# Patient Record
Sex: MALE | Race: WHITE | Employment: OTHER
[De-identification: names, ages, dates, MRNs, and addresses within clinical notes are randomized per-mention and may not be internally consistent; named-entity substitution may affect disease eponyms.]

---

## 2020-01-14 ENCOUNTER — TRANSCRIBE ORDERS (OUTPATIENT)
Dept: OTHER | Age: 75
End: 2020-01-14

## 2020-01-14 DIAGNOSIS — K22.0 ACHALASIA: Primary | ICD-10-CM

## 2020-01-17 ENCOUNTER — CARE COORDINATION (OUTPATIENT)
Dept: GASTROENTEROLOGY | Facility: CLINIC | Age: 75
End: 2020-01-17

## 2020-01-17 NOTE — PROGRESS NOTES
Care Coordination New Patient Referral  Advanced GI Service    NP referral date: 20  Referred to MD: advanced endoscopy    Referring MD: Viktor Lacy  - [facility]  Referring contact information see initial referral note   Referral for POEM    Diagnosis: Achalasia  1/10/20 attempted Heller Myotomy aborted and referral to Dr. Sepulveda made.  Preop h and P done 19 -  See Care Everywhere for full report.    Imagin20 Carl from [facility] will send images for Manometry and Esophogram.    CT   Location: [facility]  Date:  Most recent 10/13/18  MRI  No    Manometry 19 [facility]  IMPRESSION:  1. Adequate relaxation of the upper esophageal sphincter.   2. The motility of the esophageal body is markedly abnormal with   all swallows demonstrating pan-esophageal pressurization. There   was complete bolus clearance in half of the swallows.  3. The LES demonstrated adequate resting pressure with inadequate   relaxation.  The IRP was 25.6.  4. Based on [place] classification version 3.0, this study is   diagnostic of type 2 achalasia.  This is the etiology of   Vincent's dysphagia and food impaction.  Esophogram 19 [facility]    Procedures:  EGD 19 - see care everywhere  EGD 1/10/20  Findings: Proximal extent of narrowed esophageal segment started at 30 cm  No retained esophageal contents proximal to narrowed segment   Laparoscopic evaluation demonstrated very significant dense fibrotic periesophageal adhesions at hiatus  Unable to dissect esophagus circumferentially beyond hiatus   Procedure aborted and discussed with colleagues in GI   Will refer for POEM    Referral will be reviewed when all information has been received (waiting for CD with images)     20 Spoke with patient; he is aware of referral and aware that Dr. Sepulveda will review images and we will be in contact with him to set up clinic visit.    20 Per Dr. Sepulveda:  Procedure/Imaging/Clinic: Clinic visit, then  EGD with POEM; will be admitted for obs afterwards   Physician: Derick   Timing: Next avail   Procedure length: 90 min   Anesthesia: Gen   Dx: Type 3 achalasia   Location: OR     Referral sent to New Patient scheduling on 01/21/20 at 11:15 am via in basket staff message and the patient will be contacted with appointment.       Sheila GASCAN, HNBC  RN Care Coordinator  [facility]  Ph: [phone]  Email: [email or web address]

## 2020-01-21 ENCOUNTER — PREP FOR PROCEDURE (OUTPATIENT)
Dept: GASTROENTEROLOGY | Facility: CLINIC | Age: 75
End: 2020-01-21

## 2020-01-21 DIAGNOSIS — K22.0 ACHALASIA: Primary | ICD-10-CM

## 2020-01-22 NOTE — TELEPHONE ENCOUNTER
ONCOLOGY INTAKE: Records Information      APPT INFORMATION:  Referring provider:  Dr Viktor Lacy  Referring provider s clinic:  [facility]  Reason for visit/diagnosis:  Achalasia  Has patient been notified of appointment date and time?: Yes    RECORDS INFORMATION:  Were the records received with the referral (via [employer])? Yes, [facility]    Has patient been seen for any external appt for this diagnosis? yes    If yes, where? [facility]    Has patient had any imaging or procedures outside of [place] for this condition? Yes      If Yes, where? [facility]    ADDITIONAL INFORMATION:   Received IB from Sheila to schedule pt with Dr Grant first available. Called pt and scheduled.

## 2020-01-23 NOTE — TELEPHONE ENCOUNTER
RECORDS STATUS - ALL OTHER DIAGNOSIS      RECORDS RECEIVED FROM: THALIA Yusuf   DATE RECEIVED: Care Everywhere   NOTES STATUS DETAILS   OFFICE NOTE from referring provider     OFFICE NOTE from medical oncologist     DISCHARGE SUMMARY from hospital     DISCHARGE REPORT from the ER     OPERATIVE REPORT     MEDICATION LIST     CLINICAL TRIAL TREATMENTS TO DATE     LABS     PATHOLOGY REPORTS     ANYTHING RELATED TO DIAGNOSIS Negative/Benign [facility]   GENONOMIC TESTING     TYPE:     IMAGING (NEED IMAGES & REPORT)     CT SCANS     ESOPHAGRAM Merged to PACS 1/23/20 [facility]   MRI     MAMMO     ULTRASOUND     PET

## 2020-01-27 PROBLEM — K22.0 ACHALASIA: Status: ACTIVE | Noted: 2020-01-27

## 2020-01-29 ENCOUNTER — PRE VISIT (OUTPATIENT)
Dept: GASTROENTEROLOGY | Facility: CLINIC | Age: 75
End: 2020-01-29

## 2020-01-29 ENCOUNTER — OFFICE VISIT (OUTPATIENT)
Dept: GASTROENTEROLOGY | Facility: CLINIC | Age: 75
End: 2020-01-29
Attending: INTERNAL MEDICINE
Payer: MEDICARE

## 2020-01-29 VITALS
SYSTOLIC BLOOD PRESSURE: 131 MMHG | WEIGHT: 129.5 LBS | OXYGEN SATURATION: 95 % | HEART RATE: 86 BPM | DIASTOLIC BLOOD PRESSURE: 78 MMHG | RESPIRATION RATE: 14 BRPM | TEMPERATURE: 98.1 F

## 2020-01-29 DIAGNOSIS — K22.0 ACHALASIA: Primary | ICD-10-CM

## 2020-01-29 PROCEDURE — G0463 HOSPITAL OUTPT CLINIC VISIT: HCPCS | Mod: ZF

## 2020-01-29 RX ORDER — SIMETHICONE 80 MG
80 TABLET,CHEWABLE ORAL
COMMUNITY
Start: 2020-01-10 | End: 2020-03-04

## 2020-01-29 RX ORDER — TERBINAFINE HYDROCHLORIDE 250 MG/1
TABLET ORAL
COMMUNITY
Start: 2020-01-21 | End: 2020-03-11

## 2020-01-29 RX ORDER — ACETAMINOPHEN 325 MG/1
TABLET ORAL
COMMUNITY
Start: 2020-01-10 | End: 2020-03-11

## 2020-01-29 RX ORDER — LAMOTRIGINE 200 MG/1
200 TABLET ORAL 2 TIMES DAILY
COMMUNITY
Start: 2013-10-31 | End: 2020-09-17

## 2020-01-29 RX ORDER — ALENDRONATE SODIUM 70 MG/1
70 TABLET ORAL
COMMUNITY
Start: 2013-10-30 | End: 2020-06-17

## 2020-01-29 ASSESSMENT — PAIN SCALES - GENERAL: PAINLEVEL: NO PAIN (0)

## 2020-01-29 NOTE — NURSING NOTE
Oncology Rooming Note    January 29, 2020 8:54 AM   Pradeep Figueroa Jr. is a 74 year old male who presents for:    Chief Complaint   Patient presents with     Oncology Clinic Visit     New; Achalasia     Initial Vitals: /78   Pulse 86   Temp 98.1  F (36.7  C) (Oral)   Resp 14   Wt 58.7 kg (129 lb 8 oz)   SpO2 95%  There is no height or weight on file to calculate BMI. There is no height or weight on file to calculate BSA.  No Pain (0) Comment: Data Unavailable   No LMP for male patient.  Allergies reviewed: Yes  Medications reviewed: Yes    Medications: Medication refills not needed today.  Pharmacy name entered into [employer]: [id number] - [place], [place] - [address]    Clinical concerns: No concerns        Tabitha Rosa CMA

## 2020-01-29 NOTE — PROGRESS NOTES
INTERVENTIONAL ENDOSCOPY OUTPATIENT CLINIC CONSULT  DATE OF SERVICE: 1/29/2020  PHYSICIAN REQUESTING CONSULT: Salazar Sepulveda MD  Reason for Consultation: type II achalasia, aborted Heller myotomy    ASSESSMENT:  Pradeep is a 74 year old male with a PMHx of epilepsy and osteoporosis who was referred for a recent diagnosis of type II achalasia with an aborted Heller myotomy due to adhesion by Dr. Lacy at [facility].     I discussed the diagnosis of achalasia with the patient and the pathophysiology although the etiology is unclear. We reviewed all the treatment options of achalasia including Heller myotomy with fundoplication and POEM. I explained that these interventions do not reverse the condition but allow the esophagus to empty relieving symptoms.     I discussed the POEM procedure in detail including the risks which including delayed bleeding, esophageal leak/perforation, pneumoperitoneum, infection, and worsening reflux. I explained that she would be admitted after the procedure for observation with an esophagram the following morning to rule out a leak. I also explained that BID PPI for 1 month following the procedure will be needed.     I did explain that with the POEM procedure it appears that about 20% of patient will require long term PPI therapy afterwards for reflux disease in the longer term studies. The efficacy appears to be similarly high between a Heller and a POEM but Heller has much longer term data to draw off of given that POEM was first done only 10 years ago and with a fundoplication potentially a lower reflux rate. I explained the advantage of a POEM procedure is the lower invasiveness of the procedure and the potential for fewer complications. Only one head-to-head RCT is available on this which was an noninferiority study which showed POEM to be noninferior to heller myotomy with Shaun fundoplication, but with a higher reflux rate. Since heller myotomy was met with significant difficulty  during dissection, POEM is really the best option. The patient and his wife were agreeable in proceeding.      RECOMMENDATIONS:  - Proceed with EGD with POEM in the OR      Thank you for this consultation.  It was a pleasure to participate in the care of this patient; please contact us with any further questions.  A total of 45 minutes was spent in face to face evaluation with this patient, >50% of which was counseling and coordinating a management plan for this patient.     Salazar Sepulveda MD  [facility]  Division of Gastroenterology and Hepatology  [place] [address]    ________________________________________________________________  HPI:  Pradeep is a 74 year old male with a PMHx of epilepsy and osteoporosis who was referred for a recent diagnosis of type II achalasia with an aborted Heller myotomy by Dr. Lacy at [facility]. The patient and his wife report symptoms of solid food getting stuck in his midchest since last summer that have been progressively worsening. He occasionally has regurgitation. No chest pain, nausea or vomiting. He has compensated by being on essentially a pureed diet. He reports a 5-10 lb weight loss since then. He underwent an EGD which showed retained food in the esophagus but was otherwise normal. Manometry showed elevated IRP and panesophageal pressurization consistent with type II achalasia. He was referred to Dr. Lacy who called me from the operating room at the time of his surgery. Dr. Lacy encountered dense adhesions upon dissecting down to the esophagus and was concerned about causing a complication if he proceeded any further. We discussed attempting POEM instead. We also discussed doing a partial fundoplication since he was there to reduce Pradeep's risk of post-POEM reflux. I recommended against this as his dysphagia may get severe after the fundoplication in the interim waiting to undergoing a POEM.  Also we may have to wait a while longer after the fundoplication before attempting POEM as some surrounding inflammation/edema related to the fundoplication may make POEM transiently difficult at the GE junction. Dr. Lacy aborted the procedure and referred Pradeep to me. He denies any prior abdominal surgeries, trauma, or radiation.     Eckardt score:  Dysphagia   3   Regurgitation   1   Retrosternal pain  0   Weight loss   1  Total    5    PMHx:  Epilepsy  Osteoporosis    PSurgHx:  Aborted laparoscopic Heller myotomy    MEDS:  Current Outpatient Medications   Medication     acetaminophen (TYLENOL) 325 MG tablet     lamoTRIgine (LAMICTAL) 200 MG tablet     omeprazole (PRILOSEC) 20 MG DR capsule     terbinafine (LAMISIL) 250 MG tablet     alendronate (FOSAMAX) 70 MG tablet     cholecalciferol 25 MCG (1000 UT) TABS     simethicone (MYLICON) 80 MG chewable tablet     No current facility-administered medications for this visit.      ALLERGIES:  No Known Allergies  FHx: No history of GI malignancy    SOCIAL Hx:  Social History     Socioeconomic History     Marital status:      Spouse name: Not on file     Number of children: Not on file     Years of education: Not on file     Highest education level: Not on file   Occupational History     Not on file   Social Needs     Financial resource strain: Not on file     Food insecurity:     Worry: Not on file     Inability: Not on file     Transportation needs:     Medical: Not on file     Non-medical: Not on file   Tobacco Use     Smoking status: Never Smoker     Smokeless tobacco: Never Used   Substance and Sexual Activity     Alcohol use: Not Currently     Drug use: Never     Sexual activity: Not on file   Lifestyle     Physical activity:     Days per week: Not on file     Minutes per session: Not on file     Stress: Not on file   Relationships     Social connections:     Talks on phone: Not on file     Gets together: Not on file     Attends Jainism service: Not on  file     Active member of club or organization: Not on file     Attends meetings of clubs or organizations: Not on file     Relationship status: Not on file     Intimate partner violence:     Fear of current or ex partner: Not on file     Emotionally abused: Not on file     Physically abused: Not on file     Forced sexual activity: Not on file   Other Topics Concern     Not on file   Social History Narrative     Not on file       ROS: A comprehensive Review of Systems was asked and answered in the negative unless specifically commented upon in the HPI    Answers for HPI/ROS submitted by the patient on 1/26/2020   General Symptoms: No  Skin Symptoms: No  HENT Symptoms: No  EYE SYMPTOMS: No  HEART SYMPTOMS: No  LUNG SYMPTOMS: No  INTESTINAL SYMPTOMS: No  URINARY SYMPTOMS: No  REPRODUCTIVE SYMPTOMS: No  SKELETAL SYMPTOMS: No  BLOOD SYMPTOMS: No  NERVOUS SYSTEM SYMPTOMS: No  MENTAL HEALTH SYMPTOMS: No    Physical Exam  /78   Pulse 86   Temp 98.1  F (36.7  C) (Oral)   Resp 14   Wt 58.7 kg (129 lb 8 oz)   SpO2 95%   There is no height or weight on file to calculate BMI.  Gen: A&Ox3, NAD  HEENT: Moist mucus membranes, no scleral icterus.  Lungs: no respiratory distress  Abd: soft, non-tender, non-distended.  No guarding/rigidity/rebound.  Skin: no jaundice, no stigmata of chronic liver disease  Ext: warm, dry, no evidence of edema    IMAGING:  Rad Results In - 11/18/2019 11:40 AM CST  IMPRESSION  COMPARISON:  None.    FINDINGS:  The patient swallowed barium without difficulty. There is long segment narrowing of the distal esophagus extending to the gastroesophageal junction. No obstruction noted, though there was some delay of contrast beyond this point when the patient was in the prone position. No intrinsic mass appreciated. Small hiatal hernia.    IMPRESSION:   Long segment narrowing of the distal esophagus with retention of contrast proximally when in the prone position. This could be due to abnormal  motility versus a stricture. No mass was appreciated though this would be difficult to completely exclude with an esophagram. Further evaluation with manometry and/or endoscopy as clinically indicated.    Endoscopies:  Santosh Cool DO - 09/28/2019  3:59 AM CDT  [facility]  _______________________________________________________________________________  Patient Name: Pradeep Figueroa           Procedure Date: 9/28/2019  MRN: [id number]                       Gender: Male  Account Number: [id number]             YOB: 1945  Admit Type: Emergency Department        _______________________________________________________________________________    Procedure:                    Upper GI endoscopy  Proceduralist:                Santosh Cool MD - [place]                                 Gastroenterology PA  Indications/Pre-Op Diagnosis: Food bolus in the esophagus  Medications:                  General Anesthesia    Procedure Description:       Risk of bleeding, infection, perforation, need for surgery and        alternatives discussed.       The endoscope was introduced through the mouth, and advanced to the        second part of duodenum. The upper GI endoscopy was accomplished without        difficulty. The patient tolerated the procedure well.    Complications:                No immediate complications.  Estimated Blood Loss & Specimen:       Estimated blood loss: none.       Specimen collected: None    Findings:       A large amount of steak was found in the middle third of the esophagus        and in the lower third of the esophagus. Removal of food was        accomplished with rat tooth forceps.       The mid to distal esophagus appeared narrowed. No focal stricture was        identified. Biopsies were obtained with a cold biopsy forceps from the        mid and distal esopaghus to assess for eosinophilic esophagitis.       The Z-line was regular and was found 39 cm from the  incisors.       The entire examined stomach was normal.       The examined duodenum was normal.    Impressions/Post-Op Diagnosis:       - Food in the middle third of the esophagus and in the lower third of        the esophagus. Removal was successful.       - Z-line regular, 39 cm from the incisors.       - Normal stomach.       - Normal examined duodenum.    Recommendation:       - Discharge patient to home.       - Soft diet. Avoid meat.       - Remain on omeprazole 20 mg daily       - Await pathology results.       - Will have to consider repeat EGD with empiric dilation of the        esophagus vs esophageal manometry pending the results of the biopsies.       - Thank you for allowing us to participate in the care of this patient.      A) ESOPHAGUS, DISTAL, BIOPSY:  1. Normal esophageal squamous mucosa  2. Negative for reflux changes and eosinophilic esophagitis  3. Negative for columnar mucosa    B) ESOPHAGUS, MID, BIOPSY:  1. Normal esophageal squamous mucosa  2. Negative for reflux changes and eosinophilic esophagitis  3. Negative for columnar mucosa    Sarmad Basurto MD     12/10/2019  1:14 PM  Name: Pradeep Figueroa Jr.  : 1945  Date of Service:  2019    Endoscopist: Sarmad Basurto MD     Referring Provider(s):  Steffen Camarena MD    Type of Study: High resolution esophageal manometry study    Indication for the study: Patient is a 74-year-old man undergoing   evaluation for progressively worsening dysphagia.  He has had a   history food impaction in 2019 that required him to   come into the ER an undergo emergent disimpaction.  At that time,   there was no evidence of stenosis.  No evidence of eosinophilic   esophagitis was present and biopsies were unremarkable.  Has had   inadequate food intake since then.    Pertinent Gastrointestinal Investigations:  EGD 2019:    Great a esophagitis without evidence of stenosis.  Esophagram 2019:  Long segment narrowing  of the distal   esophagus with retention of contrast proximally could be due to   abnormal motility verses a stricture.  No mass present.    Pertinent medications:  Omeprazole 20 mg PO Q daily    Procedure information: High resolution esophageal manometry study   was performed using the [employer] system. Patient arrived after   overnight fasting. A motility catheter with 36 circumferential   sensors on 1 cm spacing was inserted trasnasally after   application of topical anesthesia to the nasal passage. The   catheter was positioned so that at least 2 distal sensors were in   the stomach and 2 proximal sensors were located above the upper   esophageal sphincter. A 5-minute acclimation period was provided   followed by 10 wet swallows in the supine position.     Findings:     Upper Esophageal Sphincter:    The upper esophageal sphincter relaxed normally in response to   all 10 wet swallows in supine position.    Esophageal Body:    For the 10 wet swallows in supine position: In the esophageal   body 0/10 wet swallows were followed by peristalsis (0 %), 10/10   by simultaneous contraction (100%), and 10/10 by failed   contractions (100%).     The distal contractile integral was unable to be measured due to   all swallows having failed.    The distal latency was unable to be measured due to all swallows   having failed.    Complete bolus clearance occurred in 5/10 (50 %) of swallows.    Lower Esophageal Sphincter:    The mean basal pressure of the lower esophageal sphincter was   22.8 mmHg (normal for wet swallows is 13 to 43 mmHg) for the 10   wet swallows in the supine position.    The lower esophageal sphincter did not demonstrate adequate   relaxation in response to 10 wet swallows in supine position with   an IRP of 25.6 mmHg (normal less than 15 mmHg).     IMPRESSION:  1. Adequate relaxation of the upper esophageal sphincter.   2. The motility of the esophageal body is markedly abnormal with   all swallows  demonstrating pan-esophageal pressurization. There   was complete bolus clearance in half of the swallows.  3. The LES demonstrated adequate resting pressure with inadequate   relaxation.  The IRP was 25.6.  4. Based on [place] classification version 3.0, this study is   diagnostic of type 2 achalasia.  This is the etiology of   Vincent's dysphagia and food impaction.    RECOMMENDATIONS:  1. I recommend consultation with a foregut surgeon for myotomy.

## 2020-01-29 NOTE — LETTER
1/29/2020       RE: Pradeep Figueroa Jr.  [address] [place] [address]     Dear Colleague,    Thank you for referring your patient, Pradeep Figueroa Jr., to the [facility] at [facility]. Please see a copy of my visit note below.    INTERVENTIONAL ENDOSCOPY OUTPATIENT CLINIC CONSULT  DATE OF SERVICE: 1/29/2020  PHYSICIAN REQUESTING CONSULT: Salazar Sepulveda MD  Reason for Consultation: type II achalasia, aborted Heller myotomy    ASSESSMENT:  Pradeep is a 74 year old male with a PMHx of epilepsy and osteoporosis who was referred for a recent diagnosis of type II achalasia with an aborted Heller myotomy due to adhesion by Dr. Lacy at [facility].     I discussed the diagnosis of achalasia with the patient and the pathophysiology although the etiology is unclear. We reviewed all the treatment options of achalasia including Heller myotomy with fundoplication and POEM. I explained that these interventions do not reverse the condition but allow the esophagus to empty relieving symptoms.     I discussed the POEM procedure in detail including the risks which including delayed bleeding, esophageal leak/perforation, pneumoperitoneum, infection, and worsening reflux. I explained that she would be admitted after the procedure for observation with an esophagram the following morning to rule out a leak. I also explained that BID PPI for 1 month following the procedure will be needed.     I did explain that with the POEM procedure it appears that about 20% of patient will require long term PPI therapy afterwards for reflux disease in the longer term studies. The efficacy appears to be similarly high between a Heller and a POEM but Heller has much longer term data to draw off of given that POEM was first done only 10 years ago and with a fundoplication potentially a lower reflux rate. I explained the advantage of a POEM procedure is the lower invasiveness of the  procedure and the potential for fewer complications. Only one head-to-head RCT is available on this which was an noninferiority study which showed POEM to be noninferior to heller myotomy with Shaun fundoplication, but with a higher reflux rate. Since heller myotomy was met with significant difficulty during dissection, POEM is really the best option. The patient and his wife were agreeable in proceeding.      RECOMMENDATIONS:  - Proceed with EGD with POEM in the OR      Thank you for this consultation.  It was a pleasure to participate in the care of this patient; please contact us with any further questions.  A total of 45 minutes was spent in face to face evaluation with this patient, >50% of which was counseling and coordinating a management plan for this patient.     Salazar Sepulveda MD  [facility]  Division of Gastroenterology and Hepatology  [facility] [address] [address]    ________________________________________________________________  HPI:  Pradeep is a 74 year old male with a PMHx of epilepsy and osteoporosis who was referred for a recent diagnosis of type II achalasia with an aborted Heller myotomy by Dr. Lacy at [facility]. The patient and his wife report symptoms of solid food getting stuck in his midchest since last summer that have been progressively worsening. He occasionally has regurgitation. No chest pain, nausea or vomiting. He has compensated by being on essentially a pureed diet. He reports a 5-10 lb weight loss since then. He underwent an EGD which showed retained food in the esophagus but was otherwise normal. Manometry showed elevated IRP and panesophageal pressurization consistent with type II achalasia. He was referred to Dr. Lacy who called me from the operating room at the time of his surgery. Dr. Lacy encountered dense adhesions upon dissecting down to the esophagus and was concerned about causing a complication if he  proceeded any further. We discussed attempting POEM instead. We also discussed doing a partial fundoplication since he was there to reduce Pradeep's risk of post-POEM reflux. I recommended against this as his dysphagia may get severe after the fundoplication in the interim waiting to undergoing a POEM. Also we may have to wait a while longer after the fundoplication before attempting POEM as some surrounding inflammation/edema related to the fundoplication may make POEM transiently difficult at the GE junction. Dr. Lacy aborted the procedure and referred Pradeep to me. He denies any prior abdominal surgeries, trauma, or radiation.     Eckardt score:  Dysphagia   3   Regurgitation   1   Retrosternal pain  0   Weight loss   1  Total    5    PMHx:  Epilepsy  Osteoporosis    PSurgHx:  Aborted laparoscopic Heller myotomy    MEDS:  Current Outpatient Medications   Medication     acetaminophen (TYLENOL) 325 MG tablet     lamoTRIgine (LAMICTAL) 200 MG tablet     omeprazole (PRILOSEC) 20 MG DR capsule     terbinafine (LAMISIL) 250 MG tablet     alendronate (FOSAMAX) 70 MG tablet     cholecalciferol 25 MCG (1000 UT) TABS     simethicone (MYLICON) 80 MG chewable tablet     No current facility-administered medications for this visit.      ALLERGIES:  No Known Allergies  FHx: No history of GI malignancy    SOCIAL Hx:  Social History     Socioeconomic History     Marital status:      Spouse name: Not on file     Number of children: Not on file     Years of education: Not on file     Highest education level: Not on file   Occupational History     Not on file   Social Needs     Financial resource strain: Not on file     Food insecurity:     Worry: Not on file     Inability: Not on file     Transportation needs:     Medical: Not on file     Non-medical: Not on file   Tobacco Use     Smoking status: Never Smoker     Smokeless tobacco: Never Used   Substance and Sexual Activity     Alcohol use: Not Currently     Drug use:  Never     Sexual activity: Not on file   Lifestyle     Physical activity:     Days per week: Not on file     Minutes per session: Not on file     Stress: Not on file   Relationships     Social connections:     Talks on phone: Not on file     Gets together: Not on file     Attends Mosque service: Not on file     Active member of club or organization: Not on file     Attends meetings of clubs or organizations: Not on file     Relationship status: Not on file     Intimate partner violence:     Fear of current or ex partner: Not on file     Emotionally abused: Not on file     Physically abused: Not on file     Forced sexual activity: Not on file   Other Topics Concern     Not on file   Social History Narrative     Not on file       ROS: A comprehensive Review of Systems was asked and answered in the negative unless specifically commented upon in the HPI    Answers for HPI/ROS submitted by the patient on 1/26/2020   General Symptoms: No  Skin Symptoms: No  HENT Symptoms: No  EYE SYMPTOMS: No  HEART SYMPTOMS: No  LUNG SYMPTOMS: No  INTESTINAL SYMPTOMS: No  URINARY SYMPTOMS: No  REPRODUCTIVE SYMPTOMS: No  SKELETAL SYMPTOMS: No  BLOOD SYMPTOMS: No  NERVOUS SYSTEM SYMPTOMS: No  MENTAL HEALTH SYMPTOMS: No    Physical Exam  /78   Pulse 86   Temp 98.1  F (36.7  C) (Oral)   Resp 14   Wt 58.7 kg (129 lb 8 oz)   SpO2 95%   There is no height or weight on file to calculate BMI.  Gen: A&Ox3, NAD  HEENT: Moist mucus membranes, no scleral icterus.  Lungs: no respiratory distress  Abd: soft, non-tender, non-distended.  No guarding/rigidity/rebound.  Skin: no jaundice, no stigmata of chronic liver disease  Ext: warm, dry, no evidence of edema    IMAGING:  Rad Results In - 11/18/2019 11:40 AM CST  IMPRESSION  COMPARISON:  None.    FINDINGS:  The patient swallowed barium without difficulty. There is long segment narrowing of the distal esophagus extending to the gastroesophageal junction. No obstruction noted, though there  was some delay of contrast beyond this point when the patient was in the prone position. No intrinsic mass appreciated. Small hiatal hernia.    IMPRESSION:   Long segment narrowing of the distal esophagus with retention of contrast proximally when in the prone position. This could be due to abnormal motility versus a stricture. No mass was appreciated though this would be difficult to completely exclude with an esophagram. Further evaluation with manometry and/or endoscopy as clinically indicated.    Endoscopies:  Santosh Cool DO - 09/28/2019  3:59 AM CDT  [facility]  _______________________________________________________________________________  Patient Name: Pradeep Figueroa           Procedure Date: 9/28/2019  MRN: [id number]                       Gender: Male  Account Number: [id number]             YOB: 1945  Admit Type: Emergency Department        _______________________________________________________________________________    Procedure:                    Upper GI endoscopy  Proceduralist:                Santosh Cool MD - [place]                                 Gastroenterology [facility]  Indications/Pre-Op Diagnosis: Food bolus in the esophagus  Medications:                  General Anesthesia    Procedure Description:       Risk of bleeding, infection, perforation, need for surgery and        alternatives discussed.       The endoscope was introduced through the mouth, and advanced to the        second part of duodenum. The upper GI endoscopy was accomplished without        difficulty. The patient tolerated the procedure well.    Complications:                No immediate complications.  Estimated Blood Loss & Specimen:       Estimated blood loss: none.       Specimen collected: None    Findings:       A large amount of steak was found in the middle third of the esophagus        and in the lower third of the esophagus. Removal of food was        accomplished with rat  tooth forceps.       The mid to distal esophagus appeared narrowed. No focal stricture was        identified. Biopsies were obtained with a cold biopsy forceps from the        mid and distal esopaghus to assess for eosinophilic esophagitis.       The Z-line was regular and was found 39 cm from the incisors.       The entire examined stomach was normal.       The examined duodenum was normal.    Impressions/Post-Op Diagnosis:       - Food in the middle third of the esophagus and in the lower third of        the esophagus. Removal was successful.       - Z-line regular, 39 cm from the incisors.       - Normal stomach.       - Normal examined duodenum.    Recommendation:       - Discharge patient to home.       - Soft diet. Avoid meat.       - Remain on omeprazole 20 mg daily       - Await pathology results.       - Will have to consider repeat EGD with empiric dilation of the        esophagus vs esophageal manometry pending the results of the biopsies.       - Thank you for allowing us to participate in the care of this patient.      A) ESOPHAGUS, DISTAL, BIOPSY:  1. Normal esophageal squamous mucosa  2. Negative for reflux changes and eosinophilic esophagitis  3. Negative for columnar mucosa    B) ESOPHAGUS, MID, BIOPSY:  1. Normal esophageal squamous mucosa  2. Negative for reflux changes and eosinophilic esophagitis  3. Negative for columnar mucosa    Sarmad Basurto MD     12/10/2019  1:14 PM  Name: Pradeep Figueroa .  : 1945  Date of Service:  2019    Endoscopist: Sarmad Basurto MD     Referring Provider(s):  Steffen Camarena MD    Type of Study: High resolution esophageal manometry study    Indication for the study: Patient is a 74-year-old man undergoing   evaluation for progressively worsening dysphagia.  He has had a   history food impaction in 2019 that required him to   come into the ER an undergo emergent disimpaction.  At that time,   there was no evidence of stenosis.  No  evidence of eosinophilic   esophagitis was present and biopsies were unremarkable.  Has had   inadequate food intake since then.    Pertinent Gastrointestinal Investigations:  EGD August 2019:    Great a esophagitis without evidence of stenosis.  Esophagram November 2019:  Long segment narrowing of the distal   esophagus with retention of contrast proximally could be due to   abnormal motility verses a stricture.  No mass present.    Pertinent medications:  Omeprazole 20 mg PO Q daily    Procedure information: High resolution esophageal manometry study   was performed using the [employer] system. Patient arrived after   overnight fasting. A motility catheter with 36 circumferential   sensors on 1 cm spacing was inserted trasnasally after   application of topical anesthesia to the nasal passage. The   catheter was positioned so that at least 2 distal sensors were in   the stomach and 2 proximal sensors were located above the upper   esophageal sphincter. A 5-minute acclimation period was provided   followed by 10 wet swallows in the supine position.     Findings:     Upper Esophageal Sphincter:    The upper esophageal sphincter relaxed normally in response to   all 10 wet swallows in supine position.    Esophageal Body:    For the 10 wet swallows in supine position: In the esophageal   body 0/10 wet swallows were followed by peristalsis (0 %), 10/10   by simultaneous contraction (100%), and 10/10 by failed   contractions (100%).     The distal contractile integral was unable to be measured due to   all swallows having failed.    The distal latency was unable to be measured due to all swallows   having failed.    Complete bolus clearance occurred in 5/10 (50 %) of swallows.    Lower Esophageal Sphincter:    The mean basal pressure of the lower esophageal sphincter was   22.8 mmHg (normal for wet swallows is 13 to 43 mmHg) for the 10   wet swallows in the supine position.    The lower esophageal sphincter did not  demonstrate adequate   relaxation in response to 10 wet swallows in supine position with   an IRP of 25.6 mmHg (normal less than 15 mmHg).     IMPRESSION:  1. Adequate relaxation of the upper esophageal sphincter.   2. The motility of the esophageal body is markedly abnormal with   all swallows demonstrating pan-esophageal pressurization. There   was complete bolus clearance in half of the swallows.  3. The LES demonstrated adequate resting pressure with inadequate   relaxation.  The IRP was 25.6.  4. Based on [place] classification version 3.0, this study is   diagnostic of type 2 achalasia.  This is the etiology of   Vincent's dysphagia and food impaction.    RECOMMENDATIONS:  1. I recommend consultation with a foregut surgeon for myotomy.    Salazar Sepulveda MD

## 2020-02-18 ENCOUNTER — CARE COORDINATION (OUTPATIENT)
Dept: GASTROENTEROLOGY | Facility: CLINIC | Age: 75
End: 2020-02-18

## 2020-02-18 ENCOUNTER — ANCILLARY PROCEDURE (OUTPATIENT)
Dept: CT IMAGING | Facility: CLINIC | Age: 75
End: 2020-02-18
Attending: INTERNAL MEDICINE
Payer: MEDICARE

## 2020-02-18 DIAGNOSIS — K22.0 ACHALASIA: ICD-10-CM

## 2020-02-18 DIAGNOSIS — K22.0 ACHALASIA: Primary | ICD-10-CM

## 2020-02-18 LAB
CREAT BLD-MCNC: 0.9 MG/DL (ref 0.66–1.25)
GFR SERPL CREATININE-BSD FRML MDRD: 82 ML/MIN/{1.73_M2}

## 2020-02-18 RX ORDER — IOPAMIDOL 755 MG/ML
80 INJECTION, SOLUTION INTRAVASCULAR ONCE
Status: COMPLETED | OUTPATIENT
Start: 2020-02-18 | End: 2020-02-18

## 2020-02-18 RX ADMIN — IOPAMIDOL 80 ML: 755 INJECTION, SOLUTION INTRAVASCULAR at 14:13

## 2020-02-18 NOTE — OR NURSING
Pt needs clarification   Received: Today   Message Contents   Michelle Carrera, Sheila Quiroz RN Hi Marie,     Pre-op call done with pt who said he is staying in the hospital overnight. He is scheduled as a same day. He is trying to make arrangements for someone to drive him home. Can you please clarify with him.     Thanks.     Michelle Carrera RN, BSN  Preadmission Nursing

## 2020-02-19 ENCOUNTER — PREP FOR PROCEDURE (OUTPATIENT)
Dept: GASTROENTEROLOGY | Facility: CLINIC | Age: 75
End: 2020-02-19

## 2020-02-19 NOTE — RESULT ENCOUNTER NOTE
We reviewed Pradeep's case at our multidisciplinary esophageal conference yesterday. Given the fibrosis Dr. Lacy encountered and atypical appearance of his esophagram, we proceeded with a CT chest/abd/pelvis as malignancy could be presenting as pseudoachalasia. I called the patient today and reviewed the results. There is significant circumferential mid-distal esophageal thickening with upstream dilation which is atypical for type II achalasia unless there is significant esophagitis from stasis. More concerning is the large left sided pleural effusion in the absence of a heart failure history and would be unrelated to achalasia. Given these findings, I strongly question the diagnosis of achalasia. Underlying malignancy or fibrosing mediastinitis are on the differential. We will cancel his POEM procedure tomorrow and instead do an EGD/EUS and potentially sample the esophageal wall and any surrounding lymphadenopathy. If this is nondiagnostic, then thoracentesis for diagnostic  purposes versus mediastinoscopy/pleuroscopy versus mediastinal MRI may be the next step. Patient was in agreement with this plan.    Salazar Sepulveda MD  [facility]  Division of Gastroenterology and Hepatology  [facility] [address] [id number]

## 2020-02-20 ENCOUNTER — ANESTHESIA (OUTPATIENT)
Dept: SURGERY | Facility: CLINIC | Age: 75
End: 2020-02-20
Payer: MEDICARE

## 2020-02-20 ENCOUNTER — ANESTHESIA EVENT (OUTPATIENT)
Dept: SURGERY | Facility: CLINIC | Age: 75
End: 2020-02-20
Payer: MEDICARE

## 2020-02-20 ENCOUNTER — HOSPITAL ENCOUNTER (OUTPATIENT)
Facility: CLINIC | Age: 75
Discharge: HOME | End: 2020-02-20
Attending: INTERNAL MEDICINE | Admitting: INTERNAL MEDICINE
Payer: MEDICARE

## 2020-02-20 VITALS
WEIGHT: 127.65 LBS | TEMPERATURE: 98.2 F | HEIGHT: 65 IN | RESPIRATION RATE: 16 BRPM | SYSTOLIC BLOOD PRESSURE: 131 MMHG | OXYGEN SATURATION: 96 % | DIASTOLIC BLOOD PRESSURE: 78 MMHG | HEART RATE: 76 BPM | BODY MASS INDEX: 21.27 KG/M2

## 2020-02-20 DIAGNOSIS — K22.0 ACHALASIA: ICD-10-CM

## 2020-02-20 LAB
BUN SERPL-MCNC: 18 MG/DL (ref 7–30)
ERYTHROCYTE [DISTWIDTH] IN BLOOD BY AUTOMATED COUNT: 14.1 % (ref 10–15)
GLUCOSE BLDC GLUCOMTR-MCNC: 101 MG/DL (ref 70–99)
HCT VFR BLD AUTO: 39.7 % (ref 40–53)
HGB BLD-MCNC: 12.4 G/DL (ref 13.3–17.7)
INR PPP: 1.13 (ref 0.86–1.14)
MCH RBC QN AUTO: 26.8 PG (ref 26.5–33)
MCHC RBC AUTO-ENTMCNC: 31.2 G/DL (ref 31.5–36.5)
MCV RBC AUTO: 86 FL (ref 78–100)
PLATELET # BLD AUTO: 302 10E9/L (ref 150–450)
RBC # BLD AUTO: 4.63 10E12/L (ref 4.4–5.9)
UPPER EUS: NORMAL
WBC # BLD AUTO: 7.4 10E9/L (ref 4–11)

## 2020-02-20 PROCEDURE — 85027 COMPLETE CBC AUTOMATED: CPT | Performed by: INTERNAL MEDICINE

## 2020-02-20 PROCEDURE — 85610 PROTHROMBIN TIME: CPT | Performed by: INTERNAL MEDICINE

## 2020-02-20 PROCEDURE — 25000125 ZZHC RX 250: Performed by: INTERNAL MEDICINE

## 2020-02-20 PROCEDURE — 88341 IMHCHEM/IMCYTCHM EA ADD ANTB: CPT | Performed by: INTERNAL MEDICINE

## 2020-02-20 PROCEDURE — 36000051 ZZH SURGERY LEVEL 2 1ST 30 MIN - UMMC: Performed by: INTERNAL MEDICINE

## 2020-02-20 PROCEDURE — 00000155 ZZHCL STATISTIC H-CELL BLOCK W/STAIN: Performed by: INTERNAL MEDICINE

## 2020-02-20 PROCEDURE — 88305 TISSUE EXAM BY PATHOLOGIST: CPT | Performed by: INTERNAL MEDICINE

## 2020-02-20 PROCEDURE — 84520 ASSAY OF UREA NITROGEN: CPT | Performed by: INTERNAL MEDICINE

## 2020-02-20 PROCEDURE — 37000008 ZZH ANESTHESIA TECHNICAL FEE, 1ST 30 MIN: Performed by: INTERNAL MEDICINE

## 2020-02-20 PROCEDURE — 88342 IMHCHEM/IMCYTCHM 1ST ANTB: CPT | Performed by: INTERNAL MEDICINE

## 2020-02-20 PROCEDURE — 40000170 ZZH STATISTIC PRE-PROCEDURE ASSESSMENT II: Performed by: INTERNAL MEDICINE

## 2020-02-20 PROCEDURE — 82962 GLUCOSE BLOOD TEST: CPT

## 2020-02-20 PROCEDURE — 71000014 ZZH RECOVERY PHASE 1 LEVEL 2 FIRST HR: Performed by: INTERNAL MEDICINE

## 2020-02-20 PROCEDURE — 25000125 ZZHC RX 250: Performed by: NURSE ANESTHETIST, CERTIFIED REGISTERED

## 2020-02-20 PROCEDURE — 37000009 ZZH ANESTHESIA TECHNICAL FEE, EACH ADDTL 15 MIN: Performed by: INTERNAL MEDICINE

## 2020-02-20 PROCEDURE — 27210794 ZZH OR GENERAL SUPPLY STERILE: Performed by: INTERNAL MEDICINE

## 2020-02-20 PROCEDURE — 36415 COLL VENOUS BLD VENIPUNCTURE: CPT | Performed by: INTERNAL MEDICINE

## 2020-02-20 PROCEDURE — 25800030 ZZH RX IP 258 OP 636: Performed by: NURSE ANESTHETIST, CERTIFIED REGISTERED

## 2020-02-20 PROCEDURE — 88172 CYTP DX EVAL FNA 1ST EA SITE: CPT | Performed by: INTERNAL MEDICINE

## 2020-02-20 PROCEDURE — 36000053 ZZH SURGERY LEVEL 2 EA 15 ADDTL MIN - UMMC: Performed by: INTERNAL MEDICINE

## 2020-02-20 PROCEDURE — 88173 CYTOPATH EVAL FNA REPORT: CPT | Performed by: INTERNAL MEDICINE

## 2020-02-20 PROCEDURE — 25000128 H RX IP 250 OP 636: Performed by: NURSE ANESTHETIST, CERTIFIED REGISTERED

## 2020-02-20 PROCEDURE — C1725 CATH, TRANSLUMIN NON-LASER: HCPCS | Performed by: INTERNAL MEDICINE

## 2020-02-20 PROCEDURE — 25000566 ZZH SEVOFLURANE, EA 15 MIN: Performed by: INTERNAL MEDICINE

## 2020-02-20 PROCEDURE — 71000027 ZZH RECOVERY PHASE 2 EACH 15 MINS: Performed by: INTERNAL MEDICINE

## 2020-02-20 PROCEDURE — 00000159 ZZHCL STATISTIC H-SEND OUTS PREP: Performed by: INTERNAL MEDICINE

## 2020-02-20 RX ORDER — HYDROMORPHONE HYDROCHLORIDE 1 MG/ML
.3-.5 INJECTION, SOLUTION INTRAMUSCULAR; INTRAVENOUS; SUBCUTANEOUS EVERY 10 MIN PRN
Status: DISCONTINUED | OUTPATIENT
Start: 2020-02-20 | End: 2020-02-20 | Stop reason: HOSPADM

## 2020-02-20 RX ORDER — NALOXONE HYDROCHLORIDE 0.4 MG/ML
.1-.4 INJECTION, SOLUTION INTRAMUSCULAR; INTRAVENOUS; SUBCUTANEOUS
Status: DISCONTINUED | OUTPATIENT
Start: 2020-02-20 | End: 2020-02-20 | Stop reason: HOSPADM

## 2020-02-20 RX ORDER — ONDANSETRON 4 MG/1
4 TABLET, ORALLY DISINTEGRATING ORAL EVERY 30 MIN PRN
Status: DISCONTINUED | OUTPATIENT
Start: 2020-02-20 | End: 2020-02-20 | Stop reason: HOSPADM

## 2020-02-20 RX ORDER — FENTANYL CITRATE 50 UG/ML
INJECTION, SOLUTION INTRAMUSCULAR; INTRAVENOUS PRN
Status: DISCONTINUED | OUTPATIENT
Start: 2020-02-20 | End: 2020-02-20

## 2020-02-20 RX ORDER — PROPOFOL 10 MG/ML
INJECTION, EMULSION INTRAVENOUS PRN
Status: DISCONTINUED | OUTPATIENT
Start: 2020-02-20 | End: 2020-02-20

## 2020-02-20 RX ORDER — ONDANSETRON 2 MG/ML
INJECTION INTRAMUSCULAR; INTRAVENOUS PRN
Status: DISCONTINUED | OUTPATIENT
Start: 2020-02-20 | End: 2020-02-20

## 2020-02-20 RX ORDER — FLUMAZENIL 0.1 MG/ML
0.2 INJECTION, SOLUTION INTRAVENOUS
Status: DISCONTINUED | OUTPATIENT
Start: 2020-02-20 | End: 2020-02-20 | Stop reason: HOSPADM

## 2020-02-20 RX ORDER — ALBUTEROL SULFATE 0.83 MG/ML
2.5 SOLUTION RESPIRATORY (INHALATION) EVERY 4 HOURS PRN
Status: DISCONTINUED | OUTPATIENT
Start: 2020-02-20 | End: 2020-02-20 | Stop reason: HOSPADM

## 2020-02-20 RX ORDER — HYDRALAZINE HYDROCHLORIDE 20 MG/ML
2.5-5 INJECTION INTRAMUSCULAR; INTRAVENOUS EVERY 10 MIN PRN
Status: DISCONTINUED | OUTPATIENT
Start: 2020-02-20 | End: 2020-02-20 | Stop reason: HOSPADM

## 2020-02-20 RX ORDER — LIDOCAINE HYDROCHLORIDE 20 MG/ML
INJECTION, SOLUTION INFILTRATION; PERINEURAL PRN
Status: DISCONTINUED | OUTPATIENT
Start: 2020-02-20 | End: 2020-02-20

## 2020-02-20 RX ORDER — MEPERIDINE HYDROCHLORIDE 25 MG/ML
12.5 INJECTION INTRAMUSCULAR; INTRAVENOUS; SUBCUTANEOUS
Status: DISCONTINUED | OUTPATIENT
Start: 2020-02-20 | End: 2020-02-20 | Stop reason: HOSPADM

## 2020-02-20 RX ORDER — SODIUM CHLORIDE, SODIUM LACTATE, POTASSIUM CHLORIDE, CALCIUM CHLORIDE 600; 310; 30; 20 MG/100ML; MG/100ML; MG/100ML; MG/100ML
INJECTION, SOLUTION INTRAVENOUS CONTINUOUS PRN
Status: DISCONTINUED | OUTPATIENT
Start: 2020-02-20 | End: 2020-02-20

## 2020-02-20 RX ORDER — LIDOCAINE 40 MG/G
CREAM TOPICAL
Status: DISCONTINUED | OUTPATIENT
Start: 2020-02-20 | End: 2020-02-20 | Stop reason: HOSPADM

## 2020-02-20 RX ORDER — SODIUM CHLORIDE, SODIUM LACTATE, POTASSIUM CHLORIDE, CALCIUM CHLORIDE 600; 310; 30; 20 MG/100ML; MG/100ML; MG/100ML; MG/100ML
INJECTION, SOLUTION INTRAVENOUS CONTINUOUS
Status: DISCONTINUED | OUTPATIENT
Start: 2020-02-20 | End: 2020-02-20 | Stop reason: HOSPADM

## 2020-02-20 RX ORDER — FENTANYL CITRATE 50 UG/ML
25-50 INJECTION, SOLUTION INTRAMUSCULAR; INTRAVENOUS
Status: DISCONTINUED | OUTPATIENT
Start: 2020-02-20 | End: 2020-02-20 | Stop reason: HOSPADM

## 2020-02-20 RX ORDER — DIMENHYDRINATE 50 MG/ML
25 INJECTION, SOLUTION INTRAMUSCULAR; INTRAVENOUS
Status: DISCONTINUED | OUTPATIENT
Start: 2020-02-20 | End: 2020-02-20 | Stop reason: HOSPADM

## 2020-02-20 RX ORDER — METOPROLOL TARTRATE 1 MG/ML
1-2 INJECTION, SOLUTION INTRAVENOUS EVERY 5 MIN PRN
Status: DISCONTINUED | OUTPATIENT
Start: 2020-02-20 | End: 2020-02-20 | Stop reason: HOSPADM

## 2020-02-20 RX ORDER — ONDANSETRON 2 MG/ML
4 INJECTION INTRAMUSCULAR; INTRAVENOUS EVERY 30 MIN PRN
Status: DISCONTINUED | OUTPATIENT
Start: 2020-02-20 | End: 2020-02-20 | Stop reason: HOSPADM

## 2020-02-20 RX ADMIN — ROCURONIUM BROMIDE 30 MG: 10 INJECTION INTRAVENOUS at 13:44

## 2020-02-20 RX ADMIN — PHENYLEPHRINE HYDROCHLORIDE 100 MCG: 10 INJECTION INTRAVENOUS at 13:08

## 2020-02-20 RX ADMIN — FENTANYL CITRATE 100 MCG: 50 INJECTION, SOLUTION INTRAMUSCULAR; INTRAVENOUS at 12:52

## 2020-02-20 RX ADMIN — PROPOFOL 150 MG: 10 INJECTION, EMULSION INTRAVENOUS at 12:56

## 2020-02-20 RX ADMIN — ONDANSETRON 4 MG: 2 INJECTION INTRAMUSCULAR; INTRAVENOUS at 14:14

## 2020-02-20 RX ADMIN — PHENYLEPHRINE HYDROCHLORIDE 100 MCG: 10 INJECTION INTRAVENOUS at 13:00

## 2020-02-20 RX ADMIN — SODIUM CHLORIDE, POTASSIUM CHLORIDE, SODIUM LACTATE AND CALCIUM CHLORIDE: 600; 310; 30; 20 INJECTION, SOLUTION INTRAVENOUS at 12:47

## 2020-02-20 RX ADMIN — PHENYLEPHRINE HYDROCHLORIDE 200 MCG: 10 INJECTION INTRAVENOUS at 13:03

## 2020-02-20 RX ADMIN — LIDOCAINE HYDROCHLORIDE 100 MG: 20 INJECTION, SOLUTION INFILTRATION; PERINEURAL at 12:56

## 2020-02-20 RX ADMIN — SUGAMMADEX 200 MG: 100 INJECTION, SOLUTION INTRAVENOUS at 14:14

## 2020-02-20 RX ADMIN — ROCURONIUM BROMIDE 50 MG: 10 INJECTION INTRAVENOUS at 12:56

## 2020-02-20 ASSESSMENT — ENCOUNTER SYMPTOMS: SEIZURES: 1

## 2020-02-20 ASSESSMENT — MIFFLIN-ST. JEOR: SCORE: 1235.88

## 2020-02-20 NOTE — ANESTHESIA CARE TRANSFER NOTE
Patient: Pradeep Figueroa Jr.    Procedure(s):  ENDOSCOPIC ULTRASOUND WITH FINE NEEDLE ASPIRATION, ESOPHAGOSCOPY / UPPER GASTROINTESTINAL TRACT (GI) POSSIBLE BIOPSY    Diagnosis: Achalasia [K22.0]  Diagnosis Additional Information: No value filed.    Anesthesia Type:   General     Note:  Airway :Nasal Cannula  Patient transferred to:PACU  Comments: Pt to recovery room.  Spontaneous respirations.   Report given to RN.  Handoff Report: Identifed the Patient, Identified the Reponsible Provider, Reviewed the pertinent medical history, Discussed the surgical course, Reviewed Intra-OP anesthesia mangement and issues during anesthesia, Set expectations for post-procedure period and Allowed opportunity for questions and acknowledgement of understanding      Vitals: (Last set prior to Anesthesia Care Transfer)    CRNA VITALS  2/20/2020 1350 - 2/20/2020 1424      2/20/2020             Resp Rate (set):  10                Electronically Signed By: TOY Xiao CRNA  February 20, 2020  2:24 PM

## 2020-02-20 NOTE — OP NOTE
Upper EUS 02/20/2020 12:45 PM [facility], [place], operated by [facility], [address] [place] [address] [phone]     Endoscopy Department   _______________________________________________________________________________   Patient Name: Pradeep Figueroa           Procedure Date: 2/20/2020 12:45 PM   MRN: [id number]                       Account Number: [id number]   YOB: 1945               Admit Type: Outpatient   Age: 74                               Room: OR   Gender: Male                          Note Status: Finalized   Attending MD: Salazar Sepulveda MD       Pause for the Cause: time out performed   Total Sedation Time:                     _______________________________________________________________________________       Procedure:           Upper EUS   Indications:         Abnormal chest CT, 75 y/o with progressive dysphagia                        initially diagnosed with achalasia by manometry.                        Attempted Heller myotomy by Dr. Lacy encountered                        significant fibrosis upon mediastinal dissection and                        procedure aborted. CT chest/abd/pelvis showed                        significant circumferential mid-distal esophageal                        thickening with upstream dilation which is atypical for                        type II achalasia unless there is significant                        esophagitis from stasis. More concerning is the large                        left sided pleural effusion in the absence of a heart                        failure history and would be unrelated to achalasia.                        Underlying malignancy or fibrosing mediastinitis are on                        the differential. Plan for EGD/EUS.   Providers:           Salazar Sepulveda MD, Jennifer Vanderheyden Referring MD:           Requesting Provider: Viktor Lacy MD, Sarmad Basurto MD   Medicines:           Monitored Anesthesia Care    Complications:       No immediate complications. Estimated blood loss:                        Minimal.   _______________________________________________________________________________   Procedure:           Pre-Anesthesia Assessment:                        - Prior to the procedure, a History and Physical was                        performed, and patient medications and allergies were                        reviewed. The patient is competent. The risks and                        benefits of the procedure and the sedation options and                        risks were discussed with the patient. All questions                        were answered and informed consent was obtained. Patient                        identification and proposed procedure were verified by                        the physician, the nurse, the anesthesiologist and the                        anesthetist in the procedure room. Mental Status                        Examination: alert and oriented. Airway Examination:                        normal oropharyngeal airway and neck mobility.                        Respiratory Examination: clear to auscultation. CV                        Examination: normal. Prophylactic Antibiotics: The                        patient does not require prophylactic antibiotics. Prior                        Anticoagulants: The patient has taken no previous                        anticoagulant or antiplatelet agents. ASA Grade                        Assessment: II - A patient with mild systemic disease.                        After reviewing the risks and benefits, the patient was                        deemed in satisfactory condition to undergo the                        procedure. The anesthesia plan was to use general                        anesthesia. Immediately prior to administration of                        medications, the patient was re-assessed for adequacy to                        receive sedatives. The heart  rate, respiratory rate,                        oxygen saturations, blood pressure, adequacy of                        pulmonary ventilation, and response to care were                        monitored throughout the procedure. The physical status                        of the patient was re-assessed after the procedure.                        After obtaining informed consent, the endoscope was                        passed under direct vision. Throughout the procedure,                        the patient's blood pressure, pulse, and oxygen                        saturations were monitored continuously. The Endoscope                        was introduced through the mouth, and advanced to the                        middle third of esophagus. The was introduced through                        the mouth, and advanced to the middle third of                        esophagus. The Endoscope was introduced through the                        mouth, and advanced to the lower third of esophagus. The                        Duodenoscope was introduced through the mouth, and                        advanced to the second part of duodenum. The upper EUS                        was accomplished without difficulty. The patient                        tolerated the procedure well.                                                                                     Findings:        ENDOSCOPIC FINDING: :        One extrinsic severe (stenosis; an endoscope cannot pass) stenosis was        found 30 to 40 cm from the incisors. This stenosis measured 4 mm (inner        diameter) x 10 cm (in length). The stenosis was traversed after        downsizing scope to a pediatric gastroscope. Normal overlying esophageal        mucosa. For thoroughness biopsies were taken with a cold forceps for        histology after EUS exam. Verification of patient identification for the        specimen was done by the physician and nurse using the patient's name,         birth date and medical record number. Estimated blood loss was minimal.        The examined esophagus was endoscopically normal via peds EGD scope.        The examined duodenum was endoscopically normal via peds EGD scope.        ENDOSONOGRAPHIC FINDING: :        Unable to advance radial or linear echoendoscope past 30 cm from the        incisors. Radial exam showed diffuse thickening around the esophagus was        visualized endosonographically. This appeared to be primarily due to        thickening of the adventitia (Layer 5). The thickness of the abnormal        layers measured 5 mm. The wall layers of the esophagus proper appeared        normal although unable to advance echoendoscope into the esophageal        stricture proper. No lymphadenopathy seen around the esophagus.        Linear EUS exam showed a hypoechoic, irregular tissue at 30 cm within        the mediastinum but outside the esophageal wall measuring 50 mm x 22 mm.        Difficult to say if this was just atelectasis versus abnormal        mediastinal tissue versus pleural based lesion but it appeared        continguous with the extra esophageal/adventitial tissue thickening.        Fine needle biopsy was performed. Color Doppler imaging was utilized        prior to needle puncture to confirm a lack of significant vascular        structures within the needle path. Four passes were made with the 22        gauge ultrasound biopsy needle using a transesophageal approach. A        visible core of tissue was obtained. Touch preps were performed. The        cellularity of the specimen was adequate. Final cytology results are        pending. Verification of patient identification for the specimen was        done by the physician and nurse using the patient's name, birth date and        medical record number. Estimated blood loss was minimal.                                                                                     Impression:          -  Extrinsic narrowing of the esophagus from 30-40 cm                        preventing passage of adult gastroscope and                        echoendoscopes. Able to be traversed with a pediatric                        gastroscope. Very atypical appearance for achalasia.                        Overlying mucosa within the esophagus appeared normal                        however.                        - Normal endoscopic appearance of stomach and examined                        duodenum.                        - Limited EUS exam as echoendoscopes could not pass the                        esophageal stricture.                        - Of the visualized esophagus, the esophageal wall                        proper appeared normal although unable to really                        evaluate strictured portion. There was thickening and                        abnormal, hypoechoic appearing tissue within the                        adventitia immediately around the esophagus measuring 5                        mm on the radial exam.                        - Linear EUS exam identified at ~5 cm hypoechoic area at                        30 cm from the incisors within the mediastinum but                        outside the esophageal wall. Difficult to say if this                        was just atelectasis versus abnormal mediastinal tissue                        versus a pleural based lesion but it appeared                        continguous with the extra esophageal/adventitial tissue                        thickening. Fine needle biopsy was performed.                        - For thoroughness, endoscopic forcep biopsies obtained                        from the esophageal stricture although suspect this will                        likely return normal given normal endoscopic appearance.   Recommendation:      - Discharge patient to home (ambulatory).                        - Await cytology results and await path results.                         - If pathology returns as nondiagnostic, next step will                        be thoracentesis for cytology and further studies and if                        that is nondiagnostic then potentially a                        mediastinoscopy/VATS may be needed.                        - Appearance very atypical for achalasia and other                        etiologies need to be investigated.                        - Continue prior diet                        - Above discussed with patient                                                                                       Salazar Sepulveda MD

## 2020-02-20 NOTE — ANESTHESIA POSTPROCEDURE EVALUATION
Anesthesia POST Procedure Evaluation    Patient: Pradeep Figueroa Jr.   MRN:     [id number] Gender:   male   Age:    74 year old :      1945        Preoperative Diagnosis: Achalasia [K22.0]   Procedure(s):  ENDOSCOPIC ULTRASOUND WITH FINE NEEDLE ASPIRATION, ESOPHAGOSCOPY / UPPER GASTROINTESTINAL TRACT (GI) WITH BIOPSY  BIOPSY   Postop Comments: No value filed.     Anesthesia Type: General       Disposition: Outpatient   Postop Pain Control: Uneventful            Sign Out: Well controlled pain   PONV: No   Neuro/Psych: Uneventful            Sign Out: Acceptable/Baseline neuro status   Airway/Respiratory: Uneventful            Sign Out: Acceptable/Baseline resp. status   CV/Hemodynamics: Uneventful            Sign Out: Acceptable CV status   Other NRE: NONE   DID A NON-ROUTINE EVENT OCCUR? No         Last Anesthesia Record Vitals:  CRNA VITALS  2020 1350 - 2020 1450      2020             NIBP:  133/71    Ht Rate:  76    Resp Rate (set):  10          Last PACU Vitals:  Vitals Value Taken Time   /68 2020  3:00 PM   Temp 36.6  C (97.8  F) 2020  2:25 PM   Pulse 76 2020  3:00 PM   Resp 12 2020  2:45 PM   SpO2 96 % 2020  3:04 PM   Temp src     NIBP     Pulse     SpO2     Resp     Temp     Ht Rate     Temp 2     Vitals shown include unvalidated device data.      Electronically Signed By: Aurora Jauregui MD, 2020, 3:08 PM

## 2020-02-20 NOTE — DISCHARGE INSTRUCTIONS
[facility]  Same-Day Surgery   Adult Discharge Orders & Instructions     For 24 hours after surgery    1. Get plenty of rest.  A responsible adult must stay with you for at least 24 hours after you leave the hospital.   2. Do not drive or use heavy equipment.  If you have weakness or tingling, don't drive or use heavy equipment until this feeling goes away.  3. Do not drink alcohol.  4. Avoid strenuous or risky activities.  Ask for help when climbing stairs.   5. You may feel lightheaded.  IF so, sit for a few minutes before standing.  Have someone help you get up.   6. If you have nausea (feel sick to your stomach): Drink only clear liquids such as apple juice, ginger ale, broth or 7-Up.  Rest may also help.  Be sure to drink enough fluids.  Move to a regular diet as you feel able.  7. You may have a slight fever. Call the doctor if your fever is over 100 F (37.7 C) (taken under the tongue) or lasts longer than 24 hours.  8. You may have a dry mouth, a sore throat, muscle aches or trouble sleeping.  These should go away after 24 hours.  9. Do not make important or legal decisions.   Call your doctor for any of the followin.  Signs of infection (fever, growing tenderness at the surgery site, a large amount of drainage or bleeding, severe pain, foul-smelling drainage, redness, swelling).    2. It has been over 8 to 10 hours since surgery and you are still not able to urinate (pass water).    3.  Headache for over 24 hours.    4.  Numbness, tingling or weakness the day after surgery (if you had spinal anesthesia).  To contact doctor alexander, call [phone]    or:        [phone] and ask for the resident on call for   gastroenterology (answered 24 hours a day)      Emergency Department:    [facility]: [phone]

## 2020-02-20 NOTE — ANESTHESIA PREPROCEDURE EVALUATION
"Anesthesia Pre-Procedure Evaluation    Patient: Pradeep Figueroa Jr.   MRN:     [id number] Gender:   male   Age:    74 year old :      1945        Preoperative Diagnosis: Achalasia [K22.0]   Procedure(s):  ENDOSCOPIC ULTRASOUND, ESOPHAGOSCOPY / UPPER GASTROINTESTINAL TRACT (GI) POSSIBLE BIOPSY     LABS:  CBC:   Lab Results   Component Value Date    WBC 7.4 2020    HGB 12.4 (L) 2020    HCT 39.7 (L) 2020     2020     BMP:   Lab Results   Component Value Date    BUN 18 2020     COAGS: No results found for: PTT, INR, FIBR  POC:   Lab Results   Component Value Date     (H) 2020     OTHER: No results found for: PH, LACT, A1C, DIMA, PHOS, MAG, ALBUMIN, PROTTOTAL, ALT, AST, GGT, ALKPHOS, BILITOTAL, BILIDIRECT, LIPASE, AMYLASE, AMBER, TSH, T4, T3, CRP, SED     Preop Vitals    BP Readings from Last 3 Encounters:   20 (!) 161/98   20 131/78    Pulse Readings from Last 3 Encounters:   20 82   20 86      Resp Readings from Last 3 Encounters:   20 16   20 14    SpO2 Readings from Last 3 Encounters:   20 98%   20 95%      Temp Readings from Last 1 Encounters:   20 36.7  C (98  F) (Oral)    Ht Readings from Last 1 Encounters:   20 1.651 m (5' 5\")      Wt Readings from Last 1 Encounters:   20 57.9 kg (127 lb 10.3 oz)    Estimated body mass index is 21.24 kg/m  as calculated from the following:    Height as of this encounter: 1.651 m (5' 5\").    Weight as of this encounter: 57.9 kg (127 lb 10.3 oz).     LDA:  Peripheral IV 20 Left Upper forearm (Active)   Site Assessment WDL 2020 11:56 AM   Line Status Saline locked 2020 11:56 AM   Phlebitis Scale 0-->no symptoms 2020 11:56 AM   Number of days: 0        History reviewed. No pertinent past medical history.   History reviewed. No pertinent surgical history.   No Known Allergies     Anesthesia Evaluation     . Pt has had prior anesthetic. Type: " General    No history of anesthetic complications          ROS/MED HX    ENT/Pulmonary:  - neg pulmonary ROS     Neurologic:     (+)seizures last seizure: 2013 other neuro tremor    Cardiovascular:     (+) Dyslipidemia, ----. : . . . :. .       METS/Exercise Tolerance:     Hematologic:     (+) Anemia, -      Musculoskeletal:  - neg musculoskeletal ROS       GI/Hepatic: Comment: Achalasia         Renal/Genitourinary:  - ROS Renal section negative       Endo:  - neg endo ROS       Psychiatric:  - neg psychiatric ROS       Infectious Disease:  - neg infectious disease ROS       Malignancy:      - no malignancy   Other:                         PHYSICAL EXAM:   Mental Status/Neuro: A/A/O   Airway: Facies: Feasible  Mallampati: I  Mouth/Opening: Full  TM distance: > 6 cm  Neck ROM: Full   Respiratory: Auscultation: CTAB     Resp. Rate: Normal     Resp. Effort: Normal      CV: Rhythm: Regular  Rate: Age appropriate  Heart: Normal Sounds  Edema: None   Comments:      Dental: Normal Dentition                Assessment:   ASA SCORE: 2    H&P: History and physical reviewed and following examination; no interval change.   Smoking Status:  Non-Smoker/Unknown   NPO Status: NPO Appropriate     Plan:   Anes. Type:  General   Pre-Medication: None   Induction:  IV (RSI)   Airway: ETT; Oral   Access/Monitoring: PIV   Maintenance: Balanced     Postop Plan:   Postop Pain: Opioids  Postop Sedation/Airway: Not planned  Disposition: Outpatient     PONV Management:   Adult Risk Factors:, Non-Smoker, Postop Opioids   Prevention: Ondansetron     CONSENT: Direct conversation   Plan and risks discussed with: Patient   Blood Products: Consent Deferred (Minimal Blood Loss)       Comments for Plan/Consent:  01/10/20; Masking: Easy; 7.5 at 22 cm: DL Grade 1; Tubbs 2; Insertion attempts: 1                 Aurora Jauregui MD

## 2020-02-24 ENCOUNTER — CARE COORDINATION (OUTPATIENT)
Dept: GASTROENTEROLOGY | Facility: CLINIC | Age: 75
End: 2020-02-24

## 2020-02-24 LAB — COPATH REPORT: NORMAL

## 2020-02-26 DIAGNOSIS — K22.2 ESOPHAGEAL STENOSIS: Primary | ICD-10-CM

## 2020-02-27 LAB — COPATH REPORT: NORMAL

## 2020-02-27 NOTE — TELEPHONE ENCOUNTER
RECORDS STATUS - ALL OTHER DIAGNOSIS      RECORDS RECEIVED FROM: Epic & CE/HP    DATE RECEIVED: 2/27/20   NOTES STATUS DETAILS   OFFICE NOTE from referring provider Epic    OFFICE NOTE from medical oncologist     DISCHARGE SUMMARY from hospital Epic & CE - PN 2/20/20: Epic    8/13/19: PN/HP   DISCHARGE REPORT from the ER     OPERATIVE REPORT Psychiatric & CE - HP 2/20/20: EUS w/ FNA - Epic    12/9/19: Manometry Esophageal - HP   MEDICATION LIST Psychiatric 1/21/20   CLINICAL TRIAL TREATMENTS TO DATE     LABS     PATHOLOGY REPORTS Psychiatric 2/20/20: Surg Path & FNA   ANYTHING RELATED TO DIAGNOSIS Epic 2/20/20   GENONOMIC TESTING     TYPE:     IMAGING (NEED IMAGES & REPORT)     XR Esophagram Scheduled 5/19/20    FL Esophagram PACS 11/18/19 - PN, Report in CE   CT SCANS PACS 2/18/20   MRI     MAMMO     ULTRASOUND     PET

## 2020-02-27 NOTE — RESULT ENCOUNTER NOTE
I called the patient and reviewed his finalized pathology result. The FNB after further staining was finally called malignant mesothelioma. He expressed understanding of this diagnosis. He is already scheduled to see Dr. Jamil next week and I have notified him as well of the final pathology. I will defer to the thoracic team regarding referral to oncology as I am unfamiliar with the typical management for this.     Salazar Sepulveda MD  [facility]  Division of Gastroenterology and Hepatology  [facility] [address] [id number]

## 2020-03-02 ENCOUNTER — DOCUMENTATION ONLY (OUTPATIENT)
Dept: OTHER | Facility: CLINIC | Age: 75
End: 2020-03-02

## 2020-03-03 ENCOUNTER — PRE VISIT (OUTPATIENT)
Dept: SURGERY | Facility: CLINIC | Age: 75
End: 2020-03-03

## 2020-03-03 DIAGNOSIS — C45.9 MESOTHELIOMA (H): ICD-10-CM

## 2020-03-03 DIAGNOSIS — K22.0 ACHALASIA: ICD-10-CM

## 2020-03-03 DIAGNOSIS — K22.2 ESOPHAGEAL STENOSIS: Primary | ICD-10-CM

## 2020-03-04 ENCOUNTER — ONCOLOGY VISIT (OUTPATIENT)
Dept: ONCOLOGY | Facility: CLINIC | Age: 75
End: 2020-03-04
Attending: INTERNAL MEDICINE
Payer: MEDICARE

## 2020-03-04 VITALS
WEIGHT: 127.3 LBS | HEIGHT: 64 IN | HEART RATE: 77 BPM | TEMPERATURE: 96.9 F | OXYGEN SATURATION: 98 % | BODY MASS INDEX: 21.73 KG/M2 | SYSTOLIC BLOOD PRESSURE: 133 MMHG | DIASTOLIC BLOOD PRESSURE: 87 MMHG | RESPIRATION RATE: 14 BRPM

## 2020-03-04 DIAGNOSIS — C45.0 MESOTHELIOMA (PLEURAL) (H): Primary | ICD-10-CM

## 2020-03-04 PROCEDURE — 96372 THER/PROPH/DIAG INJ SC/IM: CPT

## 2020-03-04 PROCEDURE — 25000128 H RX IP 250 OP 636: Mod: ZF | Performed by: INTERNAL MEDICINE

## 2020-03-04 PROCEDURE — 99205 OFFICE O/P NEW HI 60 MIN: CPT | Mod: GC | Performed by: INTERNAL MEDICINE

## 2020-03-04 PROCEDURE — G0463 HOSPITAL OUTPT CLINIC VISIT: HCPCS | Mod: 25

## 2020-03-04 RX ORDER — CYANOCOBALAMIN 1000 UG/ML
1000 INJECTION, SOLUTION INTRAMUSCULAR; SUBCUTANEOUS ONCE
Status: COMPLETED | OUTPATIENT
Start: 2020-03-04 | End: 2020-03-04

## 2020-03-04 RX ORDER — ALBUTEROL SULFATE 90 UG/1
2 AEROSOL, METERED RESPIRATORY (INHALATION) EVERY 6 HOURS PRN
Qty: 1 INHALER | Refills: 1 | Status: SHIPPED | OUTPATIENT
Start: 2020-03-04 | End: 2020-08-21

## 2020-03-04 RX ORDER — CODEINE PHOSPHATE AND GUAIFENESIN 10; 100 MG/5ML; MG/5ML
1-2 SOLUTION ORAL EVERY 4 HOURS PRN
Qty: 180 ML | Refills: 1 | Status: SHIPPED | OUTPATIENT
Start: 2020-03-04 | End: 2020-04-06

## 2020-03-04 RX ORDER — FOLIC ACID 1 MG/1
1 TABLET ORAL DAILY
Qty: 90 TABLET | Refills: 1 | Status: SHIPPED | OUTPATIENT
Start: 2020-03-04 | End: 2020-11-30

## 2020-03-04 RX ADMIN — CYANOCOBALAMIN 1000 MCG: 1000 INJECTION, SOLUTION INTRAMUSCULAR at 13:44

## 2020-03-04 ASSESSMENT — PAIN SCALES - GENERAL: PAINLEVEL: NO PAIN (0)

## 2020-03-04 ASSESSMENT — MIFFLIN-ST. JEOR: SCORE: 1224.3

## 2020-03-04 NOTE — NURSING NOTE
"Oncology Rooming Note    March 4, 2020 12:13 PM   Pradeep Figueroa Jr. is a 74 year old male who presents for:    Chief Complaint   Patient presents with     Oncology Clinic Visit     New; Achalasia     Initial Vitals: /87 (BP Location: Right arm, Patient Position: Chair, Cuff Size: Adult Regular)   Pulse 77   Temp 96.9  F (36.1  C) (Oral)   Resp 14   Ht 1.635 m (5' 4.37\")   Wt 57.7 kg (127 lb 4.8 oz)   SpO2 98%   BMI 21.60 kg/m   Estimated body mass index is 21.6 kg/m  as calculated from the following:    Height as of this encounter: 1.635 m (5' 4.37\").    Weight as of this encounter: 57.7 kg (127 lb 4.8 oz). Body surface area is 1.62 meters squared.  No Pain (0) Comment: Data Unavailable   No LMP for male patient.  Allergies reviewed: Yes  Medications reviewed: Yes    Medications: Medication refills not needed today.  Pharmacy name entered into [facility]: [id number] - [facility] [address]    Clinical concerns: Would like to discuss treatment options/plan.        Pricilla Jimenez CMA              "

## 2020-03-04 NOTE — PROGRESS NOTES
MEDICAL ONCOLOGY CLINIC NOTE    PATIENT NAME: Pradeep Figueroa Jr.  ENCOUNTER DATE: 3/3/2020    REASON FOR CURRENT VISIT: New diagnosis of Mesothelioma    HISTORY OF PRESENT ILLNESS:  Mr. Pradeep Figueroa Jr. is a 74 year old  Male with past medical history of epileptic seizures with last seizure in October 2013, was referred by Dr. Sepulveda from GI.  He is here to discuss management of newly diagnosed mesothelioma with his wife.      His oncologic history is as follows:  He has been worked up for with chronic progressive dysphagia since July 2019.  He initially had difficulty in swallowing solid foods that gradually progressed to difficulty in swallowing thick liquids.  He had an extensive work-up by GI including esophageal manometry which showed achalasia.  Attempted Heller myotomy by Dr. Lacy encountered significant fibrosis upon mediastinal dissection and procedure aborted. CT chest/abd/pelvis 2/18/20 showed significant circumferential mid-distal esophageal thickening with upstream dilation which was atypical for type II achalasia.  He was found to have an incidental moderate sized left sided pleural effusion   Along with some thickening in the parietal pleural lining and the right lower lobe and also thickening of the pleural lining in the mediastinum around the esophagus.    He underwent upper GI endoscopy and EUS by Dr. Sepulveda which demonstrated extrinsic narrowing of the esophagus preventing passage of adult gastroscope and echoendoscopes but Able to be traversed with a pediatric gastroscope. The appearance on EUS was very atypical  for achalasia with normal overlying mucosa within the esophagus and normal endoscopic appearance of stomach and examined duodenum.  At the site of the esophageal stricture or stenosis there was thickening and abnormal, hypoechoic appearing tissue within the  adventitia immediately around the esophagus at ~5 cm hypoechoic area at  30 cm from the incisors within the mediastinum  but outside the esophageal wall.     [facility] biopsies of the specimen showed Loss of BAP1 protein expression that is  consistent   with MALIGNANT MESOTHELIOMA, EPITHELIOID TYPE based on this limited biopsy.      Interval history:  He contines to have difficulty in swallowing even with thicker liquids and solids. He has lost 10 lbs over 6 mpnths.  Couple of times he had a feeling of solid food being stuck at the back of the throat following which he had to vomit.  In one instance a steak piece got stuck in the back of the throat and patient had to go to the hospital to have it removed.    He has had on and off pain in the left axillary area, once in twice weeks.  This is stable and not getting worse.  Recently he has noticed exertional SOB is getting worse especially with, taking the stairs.  He denies any ongoing chest pain.  He has ongoing cough that is bothering him.  The cough is mostly dry, occurring mostly at night when he is in the supine position therefore has been mostly upright at night during sleep.  The cough has become more persistent and frequent over the last 2 to 3 weeks.    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  No subsequent and chronic exposure to asbestos.    Has osteoporosis from seizure medication but has stopped fosamax.  He is otherwise very healthy and does not take any chronic medications he is very active and is able to do things that he wants to do.    Oct 2013- last seizure    REVIEW OF SYSTEMS: 14 point ROS negative other than the symptoms noted above in the HPI.    PAST MEDICAL AND SURGICAL HISTORY:   Active Ambulatory Problems     Diagnosis Date Noted     Achalasia 01/27/2020     Essential tremor 07/14/2014     History of epilepsy 04/19/1993     Hyperlipidemia 06/07/2003     IFG (impaired fasting glucose) 12/07/2011     Iron deficiency anemia 12/07/2011     Kidney stone 06/13/2013     Osteoporosis 10/15/2013     Seizure disorder (H)  2011     Varicella 2007     Resolved Ambulatory Problems     Diagnosis Date Noted     No Resolved Ambulatory Problems     No Additional Past Medical History     SOCIAL HISTORY:   Social History     Tobacco Use     Smoking status: Never Smoker     Smokeless tobacco: Never Used   Substance Use Topics     Alcohol use: Not Currently     Drug use: Never   Moderate beer daily in summer  He was a life insurance , retired for 20 yrs, [employer] insurance  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  Mother  of lung cancer who was a smoker    ALLERGIES: No Known Allergies    CURRENT MEDICATIONS:   Current Outpatient Medications:      albuterol (PROAIR HFA/PROVENTIL HFA/VENTOLIN HFA) 108 (90 Base) MCG/ACT inhaler, Inhale 2 puffs into the lungs every 6 hours as needed for shortness of breath / dyspnea or wheezing, Disp: 1 Inhaler, Rfl: 1     alendronate (FOSAMAX) 70 MG tablet, Take 70 mg by mouth every 7 days , Disp: , Rfl:      cholecalciferol 25 MCG (1000 UT) TABS, Take 1,000 Units by mouth every 24 hours, Disp: , Rfl:      folic acid (FOLVITE) 1 MG tablet, Take 1 tablet (1 mg) by mouth daily, Disp: 90 tablet, Rfl: 1     guaiFENesin-codeine (ROBITUSSIN AC) 100-10 MG/5ML solution, Take 5-10 mLs by mouth every 4 hours as needed for cough, Disp: 180 mL, Rfl: 1     lamoTRIgine (LAMICTAL) 200 MG tablet, Take 200 mg by mouth 2 times daily , Disp: , Rfl:      acetaminophen (TYLENOL) 325 MG tablet, Take Tylenol 650 mg 4 times per day for 5 days. Take once when you get up in the morning, once before bed, once between  breakfast and lunch, and between lunch and dinner., Disp: , Rfl:      omeprazole (PRILOSEC) 20 MG DR capsule, Take 20 mg by mouth, Disp: , Rfl:      terbinafine (LAMISIL) 250 MG tablet, TAKE ONE TABLET BY MOUTH  "ONCE DAILY with a glass of water, Disp: , Rfl:   No current facility-administered medications for this visit.     PHYSICAL EXAMINATION:  Vital signs: /87 (BP Location: Right arm, Patient Position: Chair, Cuff Size: Adult Regular)   Pulse 77   Temp 96.9  F (36.1  C) (Oral)   Resp 14   Ht 1.635 m (5' 4.37\")   Wt 57.7 kg (127 lb 4.8 oz)   SpO2 98%   BMI 21.60 kg/m    ECOG performance status of 0. Fatigue 0.  GENERAL: Well-nourished healthy-appearing male in chair, no acute distress.   HEENT: No icterus, no pallor. Moist mucous membranes. Oropharynx is clear.   NECK: Supple, no JVD/LAD.  LUNGS: Decreased breath sounds in the left lung base and dullness to percussion, normal work of breathing.   CARDIOVASCULAR: Regular rate and rhythm, no murmurs, gallops or rubs.   ABDOMEN: Soft, nontender and nondistended, no palpable masses, bowel sounds present.  EXTREMITIES: No cyanosis, no clubbing, no edema.   NEUROLOGIC: No focal deficits, CN 2-12 intact.  LYMPH NODE EXAM: No palpable adenopathy - cervical, axillary or inguinal.     LABORATORY DATA:  CBC RESULTS:   Recent Labs   Lab Test 02/20/20  1133   WBC 7.4   RBC 4.63   HGB 12.4*   HCT 39.7*   MCV 86   MCH 26.8   MCHC 31.2*   RDW 14.1        Just finishing up note  Recent Labs   Lab Test 02/20/20  1133   BUN 18       IMAGING STUDIES:    EXAMINATION: CT CHEST/ABDOMEN/PELVIS W CONTRAST, 2/18/2020 2:19 PM     TECHNIQUE: Helical CT images from the thoracic inlet through the  symphysis pubis were obtained with intravenous contrast. Contrast  dose: Isovue 370 80cc     COMPARISON: 11/18/2019 esophagram     HISTORY: achalasia; Achalasia     FINDINGS:     Chest: Large left and small right pleural effusions with adjacent  compressive atelectasis of the left lower lobe, lingula, and right  lower lobe. Mild left parietal pleural thickening and left  paramediastinal soft tissue thickening at the level of the upper  esophagus. No pneumothorax. No focal consolidation. " No suspicious  pulmonary nodule. Scattered calcified granulomas. The central  tracheobronchial tree is clear. No bronchial wall thickening or  bronchiectasis.     Normal heart size. No pericardial effusion. Normal caliber ascending  aorta and main pulmonary artery. No central pulmonary embolism. No  thoracic lymphadenopathy. Calcified left hilar lymph nodes. Posterior  right fat-containing Bochdalek hernia.     Circumferential wall thickening of the mid to distal esophagus  extending to the gastroesophageal junction with surrounding fluid/fat  stranding/soft tissue thickening and upstream dilation of the patulous  appearing proximal to mid esophagus. No periesophageal air or  pneumomediastinum elsewhere.     Abdomen and pelvis: Several scattered well-circumscribed, fluid  attenuation hepatic cysts. No focal suspicious hepatic lesion. The  gallbladder, biliary tree, pancreas, spleen, and adrenal glands are  unremarkable. Bilateral parapelvic renal cysts as well as an exophytic  simple cyst arising from the lower pole of the left kidney and  additional smaller cortical cysts. No hydronephrosis, hydroureter, or  urinary tract stone. The bladder is unremarkable. Dystrophic  calcifications in the moderately enlarged prostate. Symmetric seminal  vesicles. Numerous pelvic phleboliths. No free fluid or free air. No  bowel obstruction or inflammation. Scattered colonic diverticula.  Normal appendix. The major abdominal vasculature is patent. Moderate  aortoiliac atherosclerotic calcification without aneurysmal dilation.  Partially visualized right hydrocele.     Bones and soft tissues: No aggressive osseous lesion. Osteopenia. No  acute osseous abnormality. Bone island in the posterior right iliac  bone. Chronic lateral left rib fracture deformities.                                                                      IMPRESSION:   1. Marked circumferential wall thickening of the mid to distal  esophagus with surrounding  fluid/fat stranding/soft tissue thickening  and upstream dilation of the patulous appearing proximal esophagus.  Primary differential considerations include esophagitis and/or sequela  of aborted Heller myotomy procedure. An underlying mass is not  excluded, though an esophagram on 11/18/2019 demonstrated findings  most consistent with esophageal stricture. Given the strictured  appearance on esophagram, the presence of calcified left hilar lymph  nodes (compatible with prior granulomatous disease, likely  histoplasma), and the reported dense adhesions encountered during  attempted Heller myotomy, fibrosing mediastinitis is a consideration  as well. No paraesophageal air/pneumomediastinum.  2. Large left and small right pleural effusions with adjacent  compressive atelectasis, may be related to the above-described  esophageal process. Likely reactive left parietal pleural thickening.  3. Numerous hepatic cysts and parapelvic/cortical renal cysts.  4. Prostatomegaly.      ASSESSMENT AND PLAN:    74-year-old male with past medical history of epilepsy who is on antiepileptic medications with last seizure in October 2013, who is otherwise healthy presented with chronic progressive dysphagia initially with solids followed by thick liquids and was found to have esophageal stricture and thickening around the wall of the esophagus and biopsy-suspected mesothelioma.    Unresectable mesothelioma, epithelioid type-the biopsy from esophageal thickening showed loss of BAP1 which is a surrogate marker  for loss of p16 tumor suppressor  (CDKN2A), through homozygous deletions of 9p21, a molecular alteration that is encountered in a majority of epithelioid type mesothelioma.  Based on the appearance of the circumferential thickening of the esophagus which is about 5 cm in length distal to the stricture, after discussion with Dr. Ferguson of thoracic surgery, it was just determined that this is probably unresectable at this point.  We  will therefore first do a PET CT scan to figure out the extent of the disease.  Nevertheless he will need chemotherapy either as induction or palliative chemotherapy if this is truly unresectable.    We discussed with the patient and family about the natural history of mesothelioma.  We discussed that this is unresectable at this time, and we will confirm with the PET scan CT scan about the extent of involvement of mesothelioma.     We then discussed about starting him on chemotherapy with cisplatin, pemetrexed and Avastin which is a standard of care for unresectable mesothelioma based on the phase 3 MAPS trial (https://www.theHarat.com/journals/lancet/article/FKBS0241-5532(69)04724-6/fulltext) chel cochran showed adding bevacizumab to cisplatin and pemetrxed showed improvement in OS  (median 18 8 months  Vs 16 1 months; hazard ratio 0 77 ).   We discussed the common and uncommon side effects of cisplatin, pemetrexed, bevacizumab chemotherapy including and not limited to infection, kidney dysfunction, hearing loss, tinnitus, hypertension, clot etc.  We also discussed the risks and benefits of port placement for which the patient is in agreement.    Our plan is to give 2 cycles of this combination chemotherapy followed by CT scan to assess response.    His left-sided pleural effusion is moderate in size, he does not appear to be in respiratory distress and therefore there is no urgent need for thoracentesis.  If his shortness of breath gets worse we could arrange for 1.    The patient is in agreement with the below mentioned plan    Plan  --Today, patient with get labs including CBC, CMP  -In preparation for pemetrexed, we will start him on monthly B12 injection with the first injection today  --We will also send the tumor sample for genomic testing, which can help with planning for therapy in the future  --Start folic acid daily  --For shortness of breath and cough: Prescribed albuterol inhaler as needed and also codeine  cough suppressant syrup  --PET CT scan in the next week  --In the next 1 to 2 weeks patient would like to get port placement and chemotherapy infusions at [facility]  --Plan to restage with CT scan after 2 cycles of chemotherapy  --Return to clinic in 6 weeks  following the initiation of chemotherapy with the prior CT of the chest with Dr. Breen     Staffed with Dr. Nuha Infante MD  Hematology Oncology fellow  [phone]     I have independently seen and examined this patient and my assessment is in agreement with the above note.  I have reviewed all tests and past medical history and my evaluation agrees with the findings in the note.  Will obtain PET scan.  I discussed case with Dr. Ferguson and he feels patient's tumor is unresectable.  Will start with platinum, Alimta, and Avastin.  Need creatinine value fo chemotherapy.

## 2020-03-04 NOTE — LETTER
3/4/2020       RE: Pradeep Figueroa Jr.  [address] [place] [address]     Dear Colleague,    Thank you for referring your patient, Pradeep Figueroa Jr., to the [facility]. Please see a copy of my visit note below.    MEDICAL ONCOLOGY CLINIC NOTE    PATIENT NAME: Pradeep Figueroa Jr.  ENCOUNTER DATE: 3/3/2020    REASON FOR CURRENT VISIT: New diagnosis of Mesothelioma    HISTORY OF PRESENT ILLNESS:  Mr. Pradeep Figueroa Jr. is a 74 year old  Male with past medical history of epileptic seizures with last seizure in October 2013, was referred by Dr. Sepulveda from GI.  He is here to discuss management of newly diagnosed mesothelioma with his wife.      His oncologic history is as follows:  He has been worked up for with chronic progressive dysphagia since July 2019.  He initially had difficulty in swallowing solid foods that gradually progressed to difficulty in swallowing thick liquids.  He had an extensive work-up by GI including esophageal manometry which showed achalasia.  Attempted Heller myotomy by Dr. Lacy encountered significant fibrosis upon mediastinal dissection and procedure aborted. CT chest/abd/pelvis 2/18/20 showed significant circumferential mid-distal esophageal thickening with upstream dilation which was atypical for type II achalasia.  He was found to have an incidental moderate sized left sided pleural effusion   Along with some thickening in the parietal pleural lining and the right lower lobe and also thickening of the pleural lining in the mediastinum around the esophagus.    He underwent upper GI endoscopy and EUS by Dr. Sepulveda which demonstrated extrinsic narrowing of the esophagus preventing passage of adult gastroscope and echoendoscopes but Able to be traversed with a pediatric gastroscope. The appearance on EUS was very atypical  for achalasia with normal overlying mucosa within the esophagus and normal endoscopic appearance of stomach and examined duodenum.  At the  site of the esophageal stricture or stenosis there was thickening and abnormal, hypoechoic appearing tissue within the  adventitia immediately around the esophagus at ~5 cm hypoechoic area at  30 cm from the incisors within the mediastinum but outside the esophageal wall.     [facility] biopsies of the specimen showed Loss of BAP1 protein expression that is  consistent   with MALIGNANT MESOTHELIOMA, EPITHELIOID TYPE based on this limited biopsy.      Interval history:  He contines to have difficulty in swallowing even with thicker liquids and solids. He has lost 10 lbs over 6 mpnths.  Couple of times he had a feeling of solid food being stuck at the back of the throat following which he had to vomit.  In one instance a steak piece got stuck in the back of the throat and patient had to go to the hospital to have it removed.    He has had on and off pain in the left axillary area, once in twice weeks.  This is stable and not getting worse.  Recently he has noticed exertional SOB is getting worse especially with, taking the stairs.  He denies any ongoing chest pain.  He has ongoing cough that is bothering him.  The cough is mostly dry, occurring mostly at night when he is in the supine position therefore has been mostly upright at night during sleep.  The cough has become more persistent and frequent over the last 2 to 3 weeks.    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  No subsequent and chronic exposure to asbestos.    Has osteoporosis from seizure medication but has stopped fosamax.  He is otherwise very healthy and does not take any chronic medications he is very active and is able to do things that he wants to do.    Oct 2013- last seizure    REVIEW OF SYSTEMS: 14 point ROS negative other than the symptoms noted above in the HPI.    PAST MEDICAL AND SURGICAL HISTORY:   Active Ambulatory Problems     Diagnosis Date Noted     Achalasia 01/27/2020     Essential tremor  2014     History of epilepsy 1993     Hyperlipidemia 2003     IFG (impaired fasting glucose) 2011     Iron deficiency anemia 2011     Kidney stone 2013     Osteoporosis 10/15/2013     Seizure disorder (H) 2011     Varicella 2007     Resolved Ambulatory Problems     Diagnosis Date Noted     No Resolved Ambulatory Problems     No Additional Past Medical History     SOCIAL HISTORY:   Social History     Tobacco Use     Smoking status: Never Smoker     Smokeless tobacco: Never Used   Substance Use Topics     Alcohol use: Not Currently     Drug use: Never   Moderate beer daily in summer  He was a life insurance , retired for 20 yrs, [employer] insurance  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  Mother  of lung cancer who was a smoker    ALLERGIES: No Known Allergies    CURRENT MEDICATIONS:   Current Outpatient Medications:      albuterol (PROAIR HFA/PROVENTIL HFA/VENTOLIN HFA) 108 (90 Base) MCG/ACT inhaler, Inhale 2 puffs into the lungs every 6 hours as needed for shortness of breath / dyspnea or wheezing, Disp: 1 Inhaler, Rfl: 1     alendronate (FOSAMAX) 70 MG tablet, Take 70 mg by mouth every 7 days , Disp: , Rfl:      cholecalciferol 25 MCG (1000 UT) TABS, Take 1,000 Units by mouth every 24 hours, Disp: , Rfl:      folic acid (FOLVITE) 1 MG tablet, Take 1 tablet (1 mg) by mouth daily, Disp: 90 tablet, Rfl: 1     guaiFENesin-codeine (ROBITUSSIN AC) 100-10 MG/5ML solution, Take 5-10 mLs by mouth every 4 hours as needed for cough, Disp: 180 mL, Rfl: 1     lamoTRIgine (LAMICTAL) 200 MG tablet, Take 200 mg by mouth 2 times daily , Disp: , Rfl:      acetaminophen (TYLENOL) 325 MG tablet, Take Tylenol 650 mg 4 times per day for 5 days. Take once when you get up in the morning,  "once before bed, once between  breakfast and lunch, and between lunch and dinner., Disp: , Rfl:      omeprazole (PRILOSEC) 20 MG DR capsule, Take 20 mg by mouth, Disp: , Rfl:      terbinafine (LAMISIL) 250 MG tablet, TAKE ONE TABLET BY MOUTH ONCE DAILY with a glass of water, Disp: , Rfl:   No current facility-administered medications for this visit.     PHYSICAL EXAMINATION:  Vital signs: /87 (BP Location: Right arm, Patient Position: Chair, Cuff Size: Adult Regular)   Pulse 77   Temp 96.9  F (36.1  C) (Oral)   Resp 14   Ht 1.635 m (5' 4.37\")   Wt 57.7 kg (127 lb 4.8 oz)   SpO2 98%   BMI 21.60 kg/m     ECOG performance status of 0. Fatigue 0.  GENERAL: Well-nourished healthy-appearing male in chair, no acute distress.   HEENT: No icterus, no pallor. Moist mucous membranes. Oropharynx is clear.   NECK: Supple, no JVD/LAD.  LUNGS: Decreased breath sounds in the left lung base and dullness to percussion, normal work of breathing.   CARDIOVASCULAR: Regular rate and rhythm, no murmurs, gallops or rubs.   ABDOMEN: Soft, nontender and nondistended, no palpable masses, bowel sounds present.  EXTREMITIES: No cyanosis, no clubbing, no edema.   NEUROLOGIC: No focal deficits, CN 2-12 intact.  LYMPH NODE EXAM: No palpable adenopathy - cervical, axillary or inguinal.     LABORATORY DATA:  CBC RESULTS:   Recent Labs   Lab Test 02/20/20  1133   WBC 7.4   RBC 4.63   HGB 12.4*   HCT 39.7*   MCV 86   MCH 26.8   MCHC 31.2*   RDW 14.1        Just finishing up note  Recent Labs   Lab Test 02/20/20  1133   BUN 18       IMAGING STUDIES:    EXAMINATION: CT CHEST/ABDOMEN/PELVIS W CONTRAST, 2/18/2020 2:19 PM     TECHNIQUE: Helical CT images from the thoracic inlet through the  symphysis pubis were obtained with intravenous contrast. Contrast  dose: Isovue 370 80cc     COMPARISON: 11/18/2019 esophagram     HISTORY: achalasia; Achalasia     FINDINGS:     Chest: Large left and small right pleural effusions with " adjacent  compressive atelectasis of the left lower lobe, lingula, and right  lower lobe. Mild left parietal pleural thickening and left  paramediastinal soft tissue thickening at the level of the upper  esophagus. No pneumothorax. No focal consolidation. No suspicious  pulmonary nodule. Scattered calcified granulomas. The central  tracheobronchial tree is clear. No bronchial wall thickening or  bronchiectasis.     Normal heart size. No pericardial effusion. Normal caliber ascending  aorta and main pulmonary artery. No central pulmonary embolism. No  thoracic lymphadenopathy. Calcified left hilar lymph nodes. Posterior  right fat-containing Bochdalek hernia.     Circumferential wall thickening of the mid to distal esophagus  extending to the gastroesophageal junction with surrounding fluid/fat  stranding/soft tissue thickening and upstream dilation of the patulous  appearing proximal to mid esophagus. No periesophageal air or  pneumomediastinum elsewhere.     Abdomen and pelvis: Several scattered well-circumscribed, fluid  attenuation hepatic cysts. No focal suspicious hepatic lesion. The  gallbladder, biliary tree, pancreas, spleen, and adrenal glands are  unremarkable. Bilateral parapelvic renal cysts as well as an exophytic  simple cyst arising from the lower pole of the left kidney and  additional smaller cortical cysts. No hydronephrosis, hydroureter, or  urinary tract stone. The bladder is unremarkable. Dystrophic  calcifications in the moderately enlarged prostate. Symmetric seminal  vesicles. Numerous pelvic phleboliths. No free fluid or free air. No  bowel obstruction or inflammation. Scattered colonic diverticula.  Normal appendix. The major abdominal vasculature is patent. Moderate  aortoiliac atherosclerotic calcification without aneurysmal dilation.  Partially visualized right hydrocele.     Bones and soft tissues: No aggressive osseous lesion. Osteopenia. No  acute osseous abnormality. Bone island in  the posterior right iliac  bone. Chronic lateral left rib fracture deformities.                                                                      IMPRESSION:   1. Marked circumferential wall thickening of the mid to distal  esophagus with surrounding fluid/fat stranding/soft tissue thickening  and upstream dilation of the patulous appearing proximal esophagus.  Primary differential considerations include esophagitis and/or sequela  of aborted Heller myotomy procedure. An underlying mass is not  excluded, though an esophagram on 11/18/2019 demonstrated findings  most consistent with esophageal stricture. Given the strictured  appearance on esophagram, the presence of calcified left hilar lymph  nodes (compatible with prior granulomatous disease, likely  histoplasma), and the reported dense adhesions encountered during  attempted Heller myotomy, fibrosing mediastinitis is a consideration  as well. No paraesophageal air/pneumomediastinum.  2. Large left and small right pleural effusions with adjacent  compressive atelectasis, may be related to the above-described  esophageal process. Likely reactive left parietal pleural thickening.  3. Numerous hepatic cysts and parapelvic/cortical renal cysts.  4. Prostatomegaly.      ASSESSMENT AND PLAN:    74-year-old male with past medical history of epilepsy who is on antiepileptic medications with last seizure in October 2013, who is otherwise healthy presented with chronic progressive dysphagia initially with solids followed by thick liquids and was found to have esophageal stricture and thickening around the wall of the esophagus and biopsy-suspected mesothelioma.    Unresectable mesothelioma, epithelioid type-the biopsy from esophageal thickening showed loss of BAP1 which is a surrogate marker  for loss of p16 tumor suppressor  (CDKN2A), through homozygous deletions of 9p21, a molecular alteration that is encountered in a majority of epithelioid type mesothelioma.  Based  on the appearance of the circumferential thickening of the esophagus which is about 5 cm in length distal to the stricture, after discussion with Dr. Ferguson of thoracic surgery, it was just determined that this is probably unresectable at this point.  We will therefore first do a PET CT scan to figure out the extent of the disease.  Nevertheless he will need chemotherapy either as induction or palliative chemotherapy if this is truly unresectable.    We discussed with the patient and family about the natural history of mesothelioma.  We discussed that this is unresectable at this time, and we will confirm with the PET scan CT scan about the extent of involvement of mesothelioma.     We then discussed about starting him on chemotherapy with cisplatin, pemetrexed and Avastin which is a standard of care for unresectable mesothelioma based on the phase 3 MAPS trial (https://www.thelanBlottrt.com/journals/lancet/article/FYDL3503-3814(91)20138-6/fulltext) chel cochran showed adding bevacizumab to cisplatin and pemetrxed showed improvement in OS  (median 18 8 months  Vs 16 1 months; hazard ratio 0 77 ).   We discussed the common and uncommon side effects of cisplatin, pemetrexed, bevacizumab chemotherapy including and not limited to infection, kidney dysfunction, hearing loss, tinnitus, hypertension, clot etc.  We also discussed the risks and benefits of port placement for which the patient is in agreement.    Our plan is to give 2 cycles of this combination chemotherapy followed by CT scan to assess response.    His left-sided pleural effusion is moderate in size, he does not appear to be in respiratory distress and therefore there is no urgent need for thoracentesis.  If his shortness of breath gets worse we could arrange for 1.    The patient is in agreement with the below mentioned plan    Plan  --Today, patient with get labs including CBC, CMP  -In preparation for pemetrexed, we will start him on monthly B12 injection with the  first injection today  --We will also send the tumor sample for genomic testing, which can help with planning for therapy in the future  --Start folic acid daily  --For shortness of breath and cough: Prescribed albuterol inhaler as needed and also codeine cough suppressant syrup  --PET CT scan in the next week  --In the next 1 to 2 weeks patient would like to get port placement and chemotherapy infusions at [facility]  --Plan to restage with CT scan after 2 cycles of chemotherapy  --Return to clinic in 6 weeks  following the initiation of chemotherapy with the prior CT of the chest with Dr. Breen     Staffed with Dr. Nuha Infante MD  Hematology Oncology fellow  [phone]     I have independently seen and examined this patient and my assessment is in agreement with the above note.  I have reviewed all tests and past medical history and my evaluation agrees with the findings in the note.  Will obtain PET scan.  I discussed case with Dr. Ferguson and he feels patient's tumor is unresectable.  Will start with platinum, Alimta, and Avastin.  Need creatinine value fo chemotherapy.        Again, thank you for allowing me to participate in the care of your patient.      Sincerely,    Myles Breen MD

## 2020-03-05 ENCOUNTER — TELEPHONE (OUTPATIENT)
Dept: ONCOLOGY | Facility: CLINIC | Age: 75
End: 2020-03-05

## 2020-03-05 DIAGNOSIS — C45.0 MESOTHELIOMA (PLEURAL) (H): Primary | ICD-10-CM

## 2020-03-05 DIAGNOSIS — C45.0 MESOTHELIOMA OF PLEURA (H): Primary | ICD-10-CM

## 2020-03-10 ENCOUNTER — HOSPITAL ENCOUNTER (OUTPATIENT)
Dept: PET IMAGING | Facility: CLINIC | Age: 75
Discharge: HOME | End: 2020-03-10
Attending: CLINICAL NURSE SPECIALIST | Admitting: CLINICAL NURSE SPECIALIST
Payer: MEDICARE

## 2020-03-10 ENCOUNTER — PATIENT OUTREACH (OUTPATIENT)
Dept: CARE COORDINATION | Facility: CLINIC | Age: 75
End: 2020-03-10

## 2020-03-10 DIAGNOSIS — C45.0 MESOTHELIOMA OF PLEURA (H): ICD-10-CM

## 2020-03-10 PROCEDURE — 25000128 H RX IP 250 OP 636

## 2020-03-10 PROCEDURE — 34300033 ZZH RX 343

## 2020-03-10 PROCEDURE — 74177 CT ABD & PELVIS W/CONTRAST: CPT

## 2020-03-10 PROCEDURE — A9552 F18 FDG: HCPCS

## 2020-03-10 RX ORDER — IOPAMIDOL 755 MG/ML
20-135 INJECTION, SOLUTION INTRAVASCULAR ONCE
Status: COMPLETED | OUTPATIENT
Start: 2020-03-10 | End: 2020-03-10

## 2020-03-10 RX ADMIN — IOPAMIDOL 78 ML: 755 INJECTION, SOLUTION INTRAVENOUS at 15:16

## 2020-03-10 RX ADMIN — FLUDEOXYGLUCOSE F-18 10.18 MCI.: 500 INJECTION, SOLUTION INTRAVENOUS at 14:13

## 2020-03-10 NOTE — PROGRESS NOTES
Oncology Distress Screening Follow-up  Clinical Social Work  [facility]    Identified Concern and Score From Distress Screening:   How concerned are you about feeling anxious or very scared?   8       Date of Distress Screening: 3/4/2020      Data: Yossi is a 74-year-old male with newly diagnosed unresectable mesothelioma.  Disease workup is still needed to determine extent of cancer. Will require chemotherapy.      Intervention/Education Provided:: Phone call to patient about distress screening. No answer - message left. Provided contact information in order to reach writer.       Follow-up Required: Will remain available for support and await patient's return call.          Iveth Herrera [facility]  Outpatient Specialty Clinics  Direct Phone: [phone]  Pager:  [phone]

## 2020-03-11 ENCOUNTER — HEALTH MAINTENANCE LETTER (OUTPATIENT)
Age: 75
End: 2020-03-11

## 2020-03-11 ENCOUNTER — ONCOLOGY VISIT (OUTPATIENT)
Dept: ONCOLOGY | Facility: CLINIC | Age: 75
End: 2020-03-11
Attending: INTERNAL MEDICINE
Payer: MEDICARE

## 2020-03-11 ENCOUNTER — ONCOLOGY VISIT (OUTPATIENT)
Dept: ONCOLOGY | Facility: CLINIC | Age: 75
End: 2020-03-11
Attending: DIETITIAN, REGISTERED
Payer: MEDICARE

## 2020-03-11 VITALS
OXYGEN SATURATION: 97 % | HEIGHT: 65 IN | BODY MASS INDEX: 20.64 KG/M2 | TEMPERATURE: 98.5 F | DIASTOLIC BLOOD PRESSURE: 77 MMHG | WEIGHT: 123.9 LBS | SYSTOLIC BLOOD PRESSURE: 135 MMHG | HEART RATE: 83 BPM

## 2020-03-11 DIAGNOSIS — C45.0 MESOTHELIOMA (PLEURAL) (H): Primary | ICD-10-CM

## 2020-03-11 LAB — RADIOLOGIST FLAGS: NORMAL

## 2020-03-11 PROCEDURE — 99215 OFFICE O/P EST HI 40 MIN: CPT | Mod: 25 | Performed by: INTERNAL MEDICINE

## 2020-03-11 PROCEDURE — 97802 MEDICAL NUTRITION INDIV IN: CPT | Mod: GA,XU | Performed by: DIETITIAN, REGISTERED

## 2020-03-11 PROCEDURE — G0463 HOSPITAL OUTPT CLINIC VISIT: HCPCS | Mod: ZF

## 2020-03-11 RX ORDER — METHYLPREDNISOLONE SODIUM SUCCINATE 125 MG/2ML
125 INJECTION, POWDER, LYOPHILIZED, FOR SOLUTION INTRAMUSCULAR; INTRAVENOUS
Status: CANCELLED
Start: 2020-03-18

## 2020-03-11 RX ORDER — PALONOSETRON 0.05 MG/ML
0.25 INJECTION, SOLUTION INTRAVENOUS ONCE
Status: CANCELLED
Start: 2020-03-18

## 2020-03-11 RX ORDER — NALOXONE HYDROCHLORIDE 0.4 MG/ML
.1-.4 INJECTION, SOLUTION INTRAMUSCULAR; INTRAVENOUS; SUBCUTANEOUS
Status: CANCELLED | OUTPATIENT
Start: 2020-03-18

## 2020-03-11 RX ORDER — ALBUTEROL SULFATE 0.83 MG/ML
2.5 SOLUTION RESPIRATORY (INHALATION)
Status: CANCELLED | OUTPATIENT
Start: 2020-03-18

## 2020-03-11 RX ORDER — EPINEPHRINE 1 MG/ML
0.3 INJECTION, SOLUTION INTRAMUSCULAR; SUBCUTANEOUS EVERY 5 MIN PRN
Status: CANCELLED | OUTPATIENT
Start: 2020-03-18

## 2020-03-11 RX ORDER — ALBUTEROL SULFATE 90 UG/1
1-2 AEROSOL, METERED RESPIRATORY (INHALATION)
Status: CANCELLED
Start: 2020-03-18

## 2020-03-11 RX ORDER — MEPERIDINE HYDROCHLORIDE 25 MG/ML
25 INJECTION INTRAMUSCULAR; INTRAVENOUS; SUBCUTANEOUS EVERY 30 MIN PRN
Status: CANCELLED | OUTPATIENT
Start: 2020-03-18

## 2020-03-11 RX ORDER — EPINEPHRINE 0.3 MG/.3ML
0.3 INJECTION SUBCUTANEOUS EVERY 5 MIN PRN
Status: CANCELLED | OUTPATIENT
Start: 2020-03-18

## 2020-03-11 RX ORDER — SODIUM CHLORIDE 9 MG/ML
1000 INJECTION, SOLUTION INTRAVENOUS CONTINUOUS PRN
Status: CANCELLED
Start: 2020-03-18

## 2020-03-11 RX ORDER — LORAZEPAM 2 MG/ML
0.5 INJECTION INTRAMUSCULAR EVERY 4 HOURS PRN
Status: CANCELLED
Start: 2020-03-18

## 2020-03-11 RX ORDER — CYANOCOBALAMIN 1000 UG/ML
1000 INJECTION, SOLUTION INTRAMUSCULAR; SUBCUTANEOUS ONCE
Status: CANCELLED
Start: 2020-03-11

## 2020-03-11 RX ORDER — DIPHENHYDRAMINE HYDROCHLORIDE 50 MG/ML
50 INJECTION INTRAMUSCULAR; INTRAVENOUS
Status: CANCELLED
Start: 2020-03-18

## 2020-03-11 ASSESSMENT — MIFFLIN-ST. JEOR: SCORE: 1218.89

## 2020-03-11 ASSESSMENT — PAIN SCALES - GENERAL: PAINLEVEL: NO PAIN (0)

## 2020-03-11 NOTE — NURSING NOTE
"Oncology Rooming Note    March 11, 2020 11:55 AM   Pradeep Figueroa Jr. is a 74 year old male who presents for:    Chief Complaint   Patient presents with     Oncology Clinic Visit     [facility] RETURN; ACHALASIA     Initial Vitals: /77   Pulse 83   Temp 98.5  F (36.9  C) (Oral)   Ht 1.651 m (5' 5\")   Wt 56.2 kg (123 lb 14.4 oz)   SpO2 97%   BMI 20.62 kg/m   Estimated body mass index is 20.62 kg/m  as calculated from the following:    Height as of this encounter: 1.651 m (5' 5\").    Weight as of this encounter: 56.2 kg (123 lb 14.4 oz). Body surface area is 1.61 meters squared.  No Pain (0) Comment: Data Unavailable   No LMP for male patient.  Allergies reviewed: Yes  Medications reviewed: Yes    Medications: Medication refills not needed today.  Pharmacy name entered into [employer]: [id number] - [place], [place] - [address]    Clinical concerns: No new concerns.  Dr. Breen was notified.      Pricilla Rodriguez, [facility]              "

## 2020-03-11 NOTE — LETTER
3/11/2020       RE: Pradeep Figueroa Jr.  [address] [place] [address]     Dear Colleague,    Thank you for referring your patient, Pradeep Figueroa Jr., to the [facility]. Please see a copy of my visit note below.    CLINICAL NUTRITION SERVICES - ASSESSMENT NOTE    Pradeep Figueroa Jr. 74 year old referred for MNT related to pleural mesothelioma    Time Spent: 60 minutes  Visit Type: initial  Patient signed ABN form today - good for 1 year from today 3/11/20-3/11/21  Referring Physician: Nuha Tabares accompanied by: his wife, Priya    Nutrition Prescription   Recommendations Suggested by Registered Dietitian (RD):   1. 5-6 small, frequent thinly pureed meals  2. 1700-2000kcal, 70-90g protein/day   Future/Additional Recommendations: Enteral Nutrition via PEG/J tube  Formula: Isosource 1.5 shadi  Volume: 5 cartons/day (1250mL, 950 ml free water)  Provisions:  1875kcal (33kcal/kg), 85 g protein (1.5g/kg), 220g CHO, 19g fiber     Malnutrition: Severe malnutrition in the context of chronic illness     NUTRITION HISTORY  Factors affecting nutrition intake include:decreased appetite and swallowing difficulties  Current diet: pureed, some soft foods, liquids  Current appetite/intake: fair  PEG Tube: No - receptive 'if last resort'.  Strongly encouraged by both RD and MD  Chemotherapy: will be starting next week - Carboplatin, Pemetrexed and Avastin     Medical hx: extensive esophageal lesion measuring approximately 10 cm in length.  In addition, there is a left pleural-based mass.  There is a moderate-sized left pleural effusion.      He tells me that he has been struggling with peristalsis of foods.  Some foods soft, chewed very well may go down, other times, thicker pureed foods may get stuck.  When foods get stuck, he tends to vomit.  He has had to go to the ER if he is not able to vomit lodged foods.      He tells me that he has been 'filling up faster on liquids'.           Diet  "Recall  Breakfast Nutrigrain bar with coffee (chewed very well, soaked in coffee) - he has vomited on this in the past, but continues try it   Lunch Broth and cream based soups blended (chili, Bean/ham and split pea)   Dinner TV dinner (meat, vegetable and mashed potatoes) blended   Snacks Smoothie - spinach, fruit, Boost, plant based protein powder   Beverages Water and coffee.       ANTHROPOMETRICS  Height: 65\"  Weight: 123 lbs/56kg  BMI: 21.6  Weight Status:  Normal BMI  IBW: 136 lbs (90%)  Weight History: down   Wt Readings from Last 10 Encounters:   03/11/20 56.2 kg (123 lb 14.4 oz)   03/04/20 57.7 kg (127 lb 4.8 oz)   02/20/20 57.9 kg (127 lb 10.3 oz)   01/29/20 58.7 kg (129 lb 8 oz)       Dosing Weight: 56kg    Medications/vitamins/minerals/herbals:   Reviewed    Labs:   Labs reviewed    NUTRITION FOCUSED PHYSICAL ASSESSMENT FOR DIAGNOSING MALNUTRITION:  Observed:  Muscle wasting (refer to documentation in Malnutrition section)    ASSESSED NUTRITION NEEDS:  Estimated Energy Needs: 2568-2272 kcals (30-35 Kcal/Kg)  Justification: repletion  Estimated Protein Needs: 70-90 grams protein (1.2-1.5 g pro/Kg)  Justification: Repletion  Estimated Fluid Needs: 2000  mL   Justification: maintenance    MALNUTRITION:  % Weight Loss:  > 5% in 1 month (severe malnutrition)  % Intake:  </= 50% for >/= 1 month (severe malnutrition)  Subcutaneous Fat Loss:  Orbital region moderate depletion and Thoracic region moderate depletion  Muscle Loss:  Temporal region moderate depletion, Clavicle bone region moderate depletion and Anterior thigh region moderate depletion  Fluid Retention:  None noted    Malnutrition Diagnosis: Severe malnutrition  In Context of:  Chronic illness or disease    NUTRITION DIAGNOSIS:  Inadequate protein-energy intake related to dysphagia as evidenced by 5% wt loss x past 2 months    INTERVENTIONS  Provided written & verbal education:   - Discussed ways to maximize and fortify foods with calories and " protein via pureed foods.  Provided recipes for high protein, pureed meals.   - Advised pt to aim for at least 1700-2000kcal and 70-90g protein daily.  Strongly encouraged to track his calories, protein and fluids to ensure adequacy.    - Reviewed ONS options (Mass Pro weight cherise, Ensure Enlive, Ensure Plus/Boost Plus, CIB, Benecalorie etc). Encouraged utilizing these ONS in home made shakes/smoothies to prevent flavor fatigue.  Encouraged pt to start consuming 2-3 ONS or home made shakes/smoothies daily.   Encouraged to thin out pureed foods to prevent blockage.   - Strongly encouraged placement of PEG/J tube with esophageal constriction with ongoing, wt loss, PO inadequacy and intolerance.     Provided pt with corresponding education materials/handouts on:  Pureed Pleasures, Six Small Meals a Day, High Calorie, High Protein Recipe booklet, Tips to Increase the Calories in Your Diet and Sources of Protein.   Food log/tracking forms    Pt verbalize understanding of materials provided during consult.   Patient Understanding: Excellent  Expected Compliance: Excellent    Goals  1.  Aim for 5-6 small frequent meals  2.  Aim for 1700kcal and 70-90g protein/day - start tracking daily intake  3. Weight repletion towards  lbs as able       Follow-Up Plans: Pt has RD contact information for questions.    Pt to follow up with RD in 2 weeks at the time of treatment.     MONITORING AND EVALUATION:  -Food intake  -Fluid/beverage intake  -Liquid meal replacement or supplement  -Weight trends    Again, thank you for allowing me to participate in the care of your patient.      Sincerely,    Magaly Breaux RD

## 2020-03-11 NOTE — PROGRESS NOTES
CHIEF COMPLAINT:  Pleural mesothelioma.      HISTORY OF PRESENT ILLNESS:  The patient returns to clinic today to discuss further therapy.  He had a PET scan done yesterday which shows an extensive esophageal lesion measuring approximately 10 cm in length.  In addition, there is a left pleural-based mass.  There is a moderate-sized left pleural effusion.  The patient notes that swallowing liquids is still possible but that food goes down slowly.  I demonstrated to the patient and his wife the decreased caliber of the esophagus.  We talked at some length about placement of a feeding tube, and the patient would prefer waiting at this time.  The patient will have a port placed in 6 days and then receive chemotherapy at [facility].  We will do carboplatin, pemetrexed and Avastin.  I will plan on 2 cycles of chemotherapy 3 weeks apart and see the patient in 7 weeks.  We discussed possible side effects of the chemotherapy including nausea, vomiting and fatigue.      REVIEW OF SYSTEMS:  A 10-point review of systems is positive for ongoing dysphagia as noted above.  No significant change.  The patient still works on his hobby farm.  A 10-point review of system reveals no other significant findings.      LABORATORY:  The patient has a creatinine of 0.9.  We will obtain a CBC and CMP before starting chemo at [facility].      IMAGING:  The patient's PET scan reveals diffuse infiltration of the esophagus and a right lesion along the diaphragmatic surface.      PHYSICAL EXAMINATION:  Vital signs are stable.  The patient is afebrile.  No further examination was carried out today.      ASSESSMENT AND PLAN:  Pleural mesothelioma.  The patient will begin chemotherapy with carboplatin, pemetrexed and Avastin.  He will have a port placed.  I discussed possible feeding tube with him and he would like to wait at this time.  We will obtain a CMP and a CBC next week prior to his chemotherapy.  I will obtain a CT chest, abdomen and pelvis  in 7 weeks prior to his third cycle of chemotherapy and see him back at that time.     I did discuss with the patient in face to face consultation that a feeding tube was likely the only manner to get sufficient nutrition.  I recommend feeing tube with nutrition through the feeding tube in this face to face encounter.   Due to Yossi's inability to meet his nutrition needs through oral intake, I recommend Enteral Nutrition via PEG tube for >90 days, indefinite period of time.       45 minutes of this 50-minute encounter was spent in discussion of therapy plan and possible side effects.     Patient cleared for general anesthesia.

## 2020-03-11 NOTE — LETTER
3/11/2020       RE: Pradeep Figueroa Jr.  [address] [place] [address]     Dear Colleague,    Thank you for referring your patient, Pradeep Figueroa Jr., to the [facility]. Please see a copy of my visit note below.    CHIEF COMPLAINT:  Pleural mesothelioma.      HISTORY OF PRESENT ILLNESS:  The patient returns to clinic today to discuss further therapy.  He had a PET scan done yesterday which shows an extensive esophageal lesion measuring approximately 10 cm in length.  In addition, there is a left pleural-based mass.  There is a moderate-sized left pleural effusion.  The patient notes that swallowing liquids is still possible but that food goes down slowly.  I demonstrated to the patient and his wife the decreased caliber of the esophagus.  We talked at some length about placement of a feeding tube, and the patient would prefer waiting at this time.  The patient will have a port placed in 6 days and then receive chemotherapy at [facility].  We will do carboplatin, pemetrexed and Avastin.  I will plan on 2 cycles of chemotherapy 3 weeks apart and see the patient in 7 weeks.  We discussed possible side effects of the chemotherapy including nausea, vomiting and fatigue.      REVIEW OF SYSTEMS:  A 10-point review of systems is positive for ongoing dysphagia as noted above.  No significant change.  The patient still works on his hobby farm.  A 10-point review of system reveals no other significant findings.      LABORATORY:  The patient has a creatinine of 0.9.  We will obtain a CBC and CMP before starting chemo at [facility].      IMAGING:  The patient's PET scan reveals diffuse infiltration of the esophagus and a right lesion along the diaphragmatic surface.      PHYSICAL EXAMINATION:  Vital signs are stable.  The patient is afebrile.  No further examination was carried out today.      ASSESSMENT AND PLAN:  Pleural mesothelioma.  The patient will begin chemotherapy with carboplatin,  pemetrexed and Avastin.  He will have a port placed.  I discussed possible feeding tube with him and he would like to wait at this time.  We will obtain a CMP and a CBC next week prior to his chemotherapy.  I will obtain a CT chest, abdomen and pelvis in 7 weeks prior to his third cycle of chemotherapy and see him back at that time.      45 minutes of this 50-minute encounter was spent in discussion of therapy plan and possible side effects.       Myles Breen MD

## 2020-03-12 PROCEDURE — 81445 SO NEO GSAP 5-50DNA/DNA&RNA: CPT | Performed by: INTERNAL MEDICINE

## 2020-03-16 ENCOUNTER — ANESTHESIA EVENT (OUTPATIENT)
Dept: SURGERY | Facility: AMBULATORY SURGERY CENTER | Age: 75
End: 2020-03-16

## 2020-03-16 ASSESSMENT — ENCOUNTER SYMPTOMS: SEIZURES: 1

## 2020-03-16 NOTE — ANESTHESIA PREPROCEDURE EVALUATION
"Anesthesia Pre-Procedure Evaluation    Patient: Pradeep Figueroa Jr.   MRN:     [id number] Gender:   male   Age:    74 year old :      1945        Preoperative Diagnosis: Mesothelioma (H) [C45.9]   Procedure(s):  INSERTION, VASCULAR ACCESS PORT     LABS:  CBC:   Lab Results   Component Value Date    WBC 7.4 2020    HGB 12.4 (L) 2020    HCT 39.7 (L) 2020     2020     BMP:   Lab Results   Component Value Date    BUN 18 2020     COAGS:   Lab Results   Component Value Date    INR 1.13 2020     POC:   Lab Results   Component Value Date     (H) 2020     OTHER: No results found for: PH, LACT, A1C, DIMA, PHOS, MAG, ALBUMIN, PROTTOTAL, ALT, AST, GGT, ALKPHOS, BILITOTAL, BILIDIRECT, LIPASE, AMYLASE, AMBER, TSH, T4, T3, CRP, SED     Preop Vitals    BP Readings from Last 3 Encounters:   20 135/77   20 133/87   20 131/78    Pulse Readings from Last 3 Encounters:   20 83   20 77   20 76      Resp Readings from Last 3 Encounters:   20 14   20 16   20 14    SpO2 Readings from Last 3 Encounters:   20 97%   20 98%   20 96%      Temp Readings from Last 1 Encounters:   20 98.5  F (36.9  C) (Oral)    Ht Readings from Last 1 Encounters:   20 1.651 m (5' 5\")      Wt Readings from Last 1 Encounters:   20 56.2 kg (123 lb 14.4 oz)    Estimated body mass index is 20.62 kg/m  as calculated from the following:    Height as of 3/11/20: 1.651 m (5' 5\").    Weight as of 3/11/20: 56.2 kg (123 lb 14.4 oz).     LDA:        No past medical history on file.   Past Surgical History:   Procedure Laterality Date     ENDOSCOPIC ULTRASOUND UPPER GASTROINTESTINAL TRACT (GI) N/A 2020    Procedure: ENDOSCOPIC ULTRASOUND WITH FINE NEEDLE ASPIRATION, ESOPHAGOSCOPY / UPPER GASTROINTESTINAL TRACT (GI) WITH BIOPSY  BIOPSY;  Surgeon: Salazar Sepulveda MD;  Location: [facility] OR      No Known Allergies     Anesthesia " Evaluation     . Pt has had prior anesthetic. Type: General           ROS/MED HX    ENT/Pulmonary: Comment: Mesothelioma    (+), . Other pulmonary disease Pleural effusion..    Neurologic:     (+)seizures     Cardiovascular:     (+) Dyslipidemia, ----. : . . . :. .       METS/Exercise Tolerance:     Hematologic:  - neg hematologic  ROS       Musculoskeletal:  - neg musculoskeletal ROS       GI/Hepatic: Comment: Achalasia of the esophagus..  Increased swallowing difficulty to the point where he is now on a liquid diet.        Renal/Genitourinary:     (+) Nephrolithiasis ,       Endo:  - neg endo ROS       Psychiatric:  - neg psychiatric ROS       Infectious Disease:  - neg infectious disease ROS       Malignancy:      - no malignancy   Other:    - neg other ROS                     PHYSICAL EXAM:   Mental Status/Neuro: A/A/O   Airway: Facies: Feasible  Mallampati: I  Mouth/Opening: Full  TM distance: > 6 cm  Neck ROM: Full   Respiratory: Auscultation: CTAB     Resp. Rate: Normal     Resp. Effort: Normal      CV: Rhythm: Regular  Rate: Age appropriate  Heart: Normal Sounds  Edema: None   Comments:      Dental: Normal Dentition Habitus: Anorexia               Assessment:   ASA SCORE: 3    H&P: History and physical reviewed and following examination; no interval change.    NPO Status: NPO Appropriate     Plan:   Anes. Type:  MAC   Pre-Medication: None   Induction:  IV (Standard)   Airway: Native Airway   Access/Monitoring: PIV   Maintenance: Propofol Sedation     Postop Plan:   Postop Pain: None  Postop Sedation/Airway: Not planned     PONV Management:    Prevention: Ondansetron, Propofol     CONSENT: Direct conversation   Plan and risks discussed with: Patient   Blood Products: Consent Deferred (Minimal Blood Loss)                   Attila Buchanan MD

## 2020-03-17 ENCOUNTER — ANCILLARY PROCEDURE (OUTPATIENT)
Dept: GENERAL RADIOLOGY | Facility: CLINIC | Age: 75
End: 2020-03-17
Attending: SURGERY
Payer: MEDICARE

## 2020-03-17 ENCOUNTER — HOSPITAL ENCOUNTER (OUTPATIENT)
Facility: AMBULATORY SURGERY CENTER | Age: 75
Discharge: HOME | End: 2020-03-17
Attending: SURGERY | Admitting: SURGERY
Payer: MEDICARE

## 2020-03-17 ENCOUNTER — ANESTHESIA (OUTPATIENT)
Dept: SURGERY | Facility: AMBULATORY SURGERY CENTER | Age: 75
End: 2020-03-17

## 2020-03-17 VITALS
OXYGEN SATURATION: 96 % | DIASTOLIC BLOOD PRESSURE: 60 MMHG | SYSTOLIC BLOOD PRESSURE: 115 MMHG | TEMPERATURE: 97.8 F | HEART RATE: 84 BPM | RESPIRATION RATE: 16 BRPM

## 2020-03-17 DIAGNOSIS — C45.0 MESOTHELIOMA (PLEURAL) (H): Primary | ICD-10-CM

## 2020-03-17 DIAGNOSIS — C80.1 CANCER (H): ICD-10-CM

## 2020-03-17 PROCEDURE — 36561 INSERT TUNNELED CV CATH: CPT | Performed by: SURGERY

## 2020-03-17 PROCEDURE — 36561 INSERT TUNNELED CV CATH: CPT

## 2020-03-17 PROCEDURE — 71045 X-RAY EXAM CHEST 1 VIEW: CPT | Performed by: RADIOLOGY

## 2020-03-17 PROCEDURE — 77001 FLUOROGUIDE FOR VEIN DEVICE: CPT | Mod: 26 | Performed by: SURGERY

## 2020-03-17 PROCEDURE — G8916 PT W IV AB GIVEN ON TIME: HCPCS

## 2020-03-17 PROCEDURE — G8907 PT DOC NO EVENTS ON DISCHARG: HCPCS

## 2020-03-17 DEVICE — CATH PORT POWERPORT CLEARVUE ISP 8FR 5608062: Type: IMPLANTABLE DEVICE | Site: JUGULAR | Status: FUNCTIONAL

## 2020-03-17 RX ORDER — LIDOCAINE HYDROCHLORIDE AND EPINEPHRINE 10; 10 MG/ML; UG/ML
INJECTION, SOLUTION INFILTRATION; PERINEURAL PRN
Status: DISCONTINUED | OUTPATIENT
Start: 2020-03-17 | End: 2020-03-17 | Stop reason: HOSPADM

## 2020-03-17 RX ORDER — ACETAMINOPHEN 325 MG/1
975 TABLET ORAL ONCE
Status: DISCONTINUED | OUTPATIENT
Start: 2020-03-17 | End: 2020-03-18 | Stop reason: HOSPADM

## 2020-03-17 RX ORDER — SODIUM CHLORIDE, SODIUM LACTATE, POTASSIUM CHLORIDE, CALCIUM CHLORIDE 600; 310; 30; 20 MG/100ML; MG/100ML; MG/100ML; MG/100ML
INJECTION, SOLUTION INTRAVENOUS CONTINUOUS
Status: DISCONTINUED | OUTPATIENT
Start: 2020-03-17 | End: 2020-03-18 | Stop reason: HOSPADM

## 2020-03-17 RX ORDER — FENTANYL CITRATE 50 UG/ML
INJECTION, SOLUTION INTRAMUSCULAR; INTRAVENOUS PRN
Status: DISCONTINUED | OUTPATIENT
Start: 2020-03-17 | End: 2020-03-17

## 2020-03-17 RX ORDER — OXYCODONE HYDROCHLORIDE 5 MG/1
10 TABLET ORAL EVERY 4 HOURS PRN
Status: DISCONTINUED | OUTPATIENT
Start: 2020-03-17 | End: 2020-03-18 | Stop reason: HOSPADM

## 2020-03-17 RX ORDER — CEFAZOLIN SODIUM 2 G/100ML
2 INJECTION, SOLUTION INTRAVENOUS
Status: COMPLETED | OUTPATIENT
Start: 2020-03-17 | End: 2020-03-17

## 2020-03-17 RX ORDER — DEXAMETHASONE SODIUM PHOSPHATE 4 MG/ML
4 INJECTION, SOLUTION INTRA-ARTICULAR; INTRALESIONAL; INTRAMUSCULAR; INTRAVENOUS; SOFT TISSUE EVERY 10 MIN PRN
Status: DISCONTINUED | OUTPATIENT
Start: 2020-03-17 | End: 2020-03-18 | Stop reason: HOSPADM

## 2020-03-17 RX ORDER — FOLIC ACID 1 MG/1
1 TABLET ORAL DAILY
Qty: 30 TABLET | Refills: 3 | Status: ON HOLD | OUTPATIENT
Start: 2020-03-17 | End: 2020-03-31

## 2020-03-17 RX ORDER — METOPROLOL TARTRATE 1 MG/ML
1-2 INJECTION, SOLUTION INTRAVENOUS EVERY 5 MIN PRN
Status: DISCONTINUED | OUTPATIENT
Start: 2020-03-17 | End: 2020-03-18 | Stop reason: HOSPADM

## 2020-03-17 RX ORDER — ONDANSETRON 2 MG/ML
INJECTION INTRAMUSCULAR; INTRAVENOUS PRN
Status: DISCONTINUED | OUTPATIENT
Start: 2020-03-17 | End: 2020-03-17

## 2020-03-17 RX ORDER — HYDRALAZINE HYDROCHLORIDE 20 MG/ML
2.5-5 INJECTION INTRAMUSCULAR; INTRAVENOUS EVERY 10 MIN PRN
Status: DISCONTINUED | OUTPATIENT
Start: 2020-03-17 | End: 2020-03-18 | Stop reason: HOSPADM

## 2020-03-17 RX ORDER — PHYSOSTIGMINE SALICYLATE 1 MG/ML
1.2 INJECTION INTRAVENOUS
Status: DISCONTINUED | OUTPATIENT
Start: 2020-03-17 | End: 2020-03-18 | Stop reason: HOSPADM

## 2020-03-17 RX ORDER — CEFAZOLIN SODIUM 1 G/3ML
1 INJECTION, POWDER, FOR SOLUTION INTRAMUSCULAR; INTRAVENOUS SEE ADMIN INSTRUCTIONS
Status: DISCONTINUED | OUTPATIENT
Start: 2020-03-17 | End: 2020-03-18 | Stop reason: HOSPADM

## 2020-03-17 RX ORDER — LORAZEPAM 0.5 MG/1
0.5 TABLET ORAL EVERY 4 HOURS PRN
Qty: 30 TABLET | Refills: 3 | Status: SHIPPED | OUTPATIENT
Start: 2020-03-17 | End: 2020-06-17

## 2020-03-17 RX ORDER — MEPERIDINE HYDROCHLORIDE 25 MG/ML
12.5 INJECTION INTRAMUSCULAR; INTRAVENOUS; SUBCUTANEOUS
Status: DISCONTINUED | OUTPATIENT
Start: 2020-03-17 | End: 2020-03-18 | Stop reason: HOSPADM

## 2020-03-17 RX ORDER — FENTANYL CITRATE 50 UG/ML
25-50 INJECTION, SOLUTION INTRAMUSCULAR; INTRAVENOUS
Status: DISCONTINUED | OUTPATIENT
Start: 2020-03-17 | End: 2020-03-18 | Stop reason: HOSPADM

## 2020-03-17 RX ORDER — OXYCODONE HYDROCHLORIDE 5 MG/1
5 TABLET ORAL
Status: DISCONTINUED | OUTPATIENT
Start: 2020-03-17 | End: 2020-03-18 | Stop reason: HOSPADM

## 2020-03-17 RX ORDER — PROCHLORPERAZINE MALEATE 10 MG
10 TABLET ORAL EVERY 6 HOURS PRN
Qty: 30 TABLET | Refills: 3 | Status: SHIPPED | OUTPATIENT
Start: 2020-03-17 | End: 2020-06-17

## 2020-03-17 RX ORDER — ALBUTEROL SULFATE 0.83 MG/ML
2.5 SOLUTION RESPIRATORY (INHALATION) EVERY 4 HOURS PRN
Status: DISCONTINUED | OUTPATIENT
Start: 2020-03-17 | End: 2020-03-18 | Stop reason: HOSPADM

## 2020-03-17 RX ORDER — DEXAMETHASONE 4 MG/1
4 TABLET ORAL 2 TIMES DAILY
Qty: 6 TABLET | Refills: 3 | Status: SHIPPED | OUTPATIENT
Start: 2020-03-23 | End: 2020-06-17

## 2020-03-17 RX ORDER — OXYCODONE HYDROCHLORIDE 5 MG/1
5-10 TABLET ORAL EVERY 4 HOURS PRN
Qty: 10 TABLET | Refills: 0 | Status: SHIPPED | OUTPATIENT
Start: 2020-03-17 | End: 2020-04-17

## 2020-03-17 RX ORDER — NALOXONE HYDROCHLORIDE 0.4 MG/ML
.1-.4 INJECTION, SOLUTION INTRAMUSCULAR; INTRAVENOUS; SUBCUTANEOUS
Status: DISCONTINUED | OUTPATIENT
Start: 2020-03-17 | End: 2020-03-18 | Stop reason: HOSPADM

## 2020-03-17 RX ORDER — SODIUM CHLORIDE, SODIUM LACTATE, POTASSIUM CHLORIDE, CALCIUM CHLORIDE 600; 310; 30; 20 MG/100ML; MG/100ML; MG/100ML; MG/100ML
INJECTION, SOLUTION INTRAVENOUS CONTINUOUS PRN
Status: DISCONTINUED | OUTPATIENT
Start: 2020-03-17 | End: 2020-03-17

## 2020-03-17 RX ORDER — KETOROLAC TROMETHAMINE 30 MG/ML
15 INJECTION, SOLUTION INTRAMUSCULAR; INTRAVENOUS ONCE
Status: COMPLETED | OUTPATIENT
Start: 2020-03-17 | End: 2020-03-17

## 2020-03-17 RX ORDER — ACETAMINOPHEN 325 MG/1
650 TABLET ORAL EVERY 4 HOURS PRN
Qty: 50 TABLET | Refills: 0 | Status: SHIPPED | OUTPATIENT
Start: 2020-03-17

## 2020-03-17 RX ORDER — ONDANSETRON 2 MG/ML
4 INJECTION INTRAMUSCULAR; INTRAVENOUS EVERY 30 MIN PRN
Status: DISCONTINUED | OUTPATIENT
Start: 2020-03-17 | End: 2020-03-18 | Stop reason: HOSPADM

## 2020-03-17 RX ORDER — HEPARIN SODIUM 5000 [USP'U]/.5ML
5000 INJECTION, SOLUTION INTRAVENOUS; SUBCUTANEOUS ONCE
Status: COMPLETED | OUTPATIENT
Start: 2020-03-17 | End: 2020-03-17

## 2020-03-17 RX ORDER — LIDOCAINE HYDROCHLORIDE 20 MG/ML
INJECTION, SOLUTION INFILTRATION; PERINEURAL PRN
Status: DISCONTINUED | OUTPATIENT
Start: 2020-03-17 | End: 2020-03-17

## 2020-03-17 RX ORDER — PROPOFOL 10 MG/ML
INJECTION, EMULSION INTRAVENOUS CONTINUOUS PRN
Status: DISCONTINUED | OUTPATIENT
Start: 2020-03-17 | End: 2020-03-17

## 2020-03-17 RX ORDER — ONDANSETRON 4 MG/1
4 TABLET, ORALLY DISINTEGRATING ORAL EVERY 30 MIN PRN
Status: DISCONTINUED | OUTPATIENT
Start: 2020-03-17 | End: 2020-03-18 | Stop reason: HOSPADM

## 2020-03-17 RX ADMIN — SODIUM CHLORIDE, SODIUM LACTATE, POTASSIUM CHLORIDE, CALCIUM CHLORIDE: 600; 310; 30; 20 INJECTION, SOLUTION INTRAVENOUS at 11:58

## 2020-03-17 RX ADMIN — LIDOCAINE HYDROCHLORIDE 40 MG: 20 INJECTION, SOLUTION INFILTRATION; PERINEURAL at 12:01

## 2020-03-17 RX ADMIN — CEFAZOLIN SODIUM 2 G: 2 INJECTION, SOLUTION INTRAVENOUS at 12:05

## 2020-03-17 RX ADMIN — HEPARIN SODIUM 5000 UNITS: 5000 INJECTION, SOLUTION INTRAVENOUS; SUBCUTANEOUS at 11:44

## 2020-03-17 RX ADMIN — KETOROLAC TROMETHAMINE 15 MG: 30 INJECTION, SOLUTION INTRAMUSCULAR; INTRAVENOUS at 11:31

## 2020-03-17 RX ADMIN — FENTANYL CITRATE 50 MCG: 50 INJECTION, SOLUTION INTRAMUSCULAR; INTRAVENOUS at 12:00

## 2020-03-17 RX ADMIN — ONDANSETRON 4 MG: 2 INJECTION INTRAMUSCULAR; INTRAVENOUS at 12:15

## 2020-03-17 RX ADMIN — PROPOFOL 75 MCG/KG/MIN: 10 INJECTION, EMULSION INTRAVENOUS at 12:01

## 2020-03-17 NOTE — OP NOTE
Operative Note    Pre-operative diagnosis: Mesothelioma (H) [C45.9]  Post-operative diagnosis Same as pre-operative diagnosis    Procedure: Procedure(s):  INSERTION, VASCULAR ACCESS PORT RIGHT INTERNAL JUGULAR- under ultrasound guidance  Surgeon: Surgeon(s) and Role:     * Sandeep Ramirez DO - Primary     * Jerome Álvarez MD - Assisting  Anesthesia: Monitor Anesthesia Care   Estimated blood loss: Less than 10 ml  Drains: None  Specimens: * No specimens in log *  Findings:   normal anatomy.  Complications: None.  Implants:   Implant Name Type Inv. Item Serial No.  Lot No. LRB No. Used Action   CATH PORT POWERPORT CLEARVUE ISP [id number] Catheter CATH PORT POWERPORT CLEARVUE ISP [id number] [id number]  CR [employer] [id number] Right 1 Implanted           Indications for procedure:     73 y/o male with Pre-Op Diagnosis Codes:     * Mesothelioma (H) [C45.9]. They will begin chemotherapy and is in need for mediport placement.  Risks and benefits were discussed in detail with the patient and his wife.  Consent was signed by the patient and wife.     Procedure:     The patient was brought to the operating room and placed on the table in supine position.  Shoulder roll was placed.  MAC was performed by the CRNA.  The chest and neck was prepped and draped in the usual sterile fashion.  Time out was performed ensure correct patient, correct procedure.  Under ultrasound guidance, the Right Internal jugular vein was identified. 1% lidocaine with epinephrine was injected over the vein, and into the right chest wall.   An 18 guauge needle was used to puncture the right internal jugular.  Wire was passed without difficulty and placement confirmed by x-ray.  A #15 blade scalpel was used to make an 3 cm incision onto the right chest.  A pocket was created in the subcutaneous tissue.  A hemostat was passed from the wire location into the pocket and the catheter was pulled through the tract.  A dilator/sheath  was passed over the wire, the wire removed, and the catheter (previously cut to 25 cm) was placed into the sheath.  The sheath was removed.  The infusaport was accessed with a lobato needle and blood return and easy flushing was noted.  Placement of the catheter was confirmed with x-ray.  Final flush was administered.     The port was secured to the chest was with 2-0 vicyrl sutures.  The subcutaneous tissue was approximated with 4-0 vicryl suture.  Dermabond was applied.  Patient was transferred to PACU in stable condition.

## 2020-03-17 NOTE — ANESTHESIA POSTPROCEDURE EVALUATION
Anesthesia POST Procedure Evaluation    Patient: Pradeep Figueroa Jr.   MRN:     [id number] Gender:   male   Age:    74 year old :      1945        Preoperative Diagnosis: Mesothelioma (H) [C45.9]   Procedure(s):  INSERTION, VASCULAR ACCESS PORT RIGHT INTERNAL JUGULAR   Postop Comments: No value filed.     Anesthesia Type: MAC       Disposition: Outpatient   Postop Pain Control: Uneventful            Sign Out: Well controlled pain   PONV: No   Neuro/Psych: Uneventful            Sign Out: Acceptable/Baseline neuro status   Airway/Respiratory: Uneventful            Sign Out: Acceptable/Baseline resp. status   CV/Hemodynamics: Uneventful            Sign Out: Acceptable CV status   Other NRE: NONE   DID A NON-ROUTINE EVENT OCCUR? No         Last Anesthesia Record Vitals:  CRNA VITALS  3/17/2020 1220 - 3/17/2020 1320      3/17/2020             Pulse:  97    SpO2:  100 %          Last PACU Vitals:  Vitals Value Taken Time   /58 3/17/2020 12:51 PM   Temp 97.8  F (36.6  C) 3/17/2020 12:51 PM   Pulse 91 3/17/2020 12:51 PM   Resp 16 3/17/2020 12:51 PM   SpO2 97 % 3/17/2020 12:51 PM   Temp src     NIBP 112/67 3/17/2020 12:46 PM   Pulse 97 3/17/2020 12:51 PM   SpO2 100 % 3/17/2020 12:51 PM   Resp     Temp 98.4  F (36.9  C) 3/17/2020 12:36 PM   Ht Rate     Temp 2           Electronically Signed By: Attila Buchanan MD, 2020, 1:30 PM

## 2020-03-17 NOTE — BRIEF OP NOTE
[facility]    Brief Operative Note    Pre-operative diagnosis: Mesothelioma (H) [C45.9]  Post-operative diagnosis Same as pre-operative diagnosis    Procedure: Procedure(s):  INSERTION, VASCULAR ACCESS PORT RIGHT INTERNAL JUGULAR  Surgeon: Surgeon(s) and Role:     * Sandeep Ramirez DO - Primary     * Jerome Álvarez MD - Assisting  Anesthesia: Monitor Anesthesia Care   Estimated blood loss: Less than 10 ml  Drains: None  Specimens: * No specimens in log *  Findings:   normal anatomy.  Complications: None.  Implants:   Implant Name Type Inv. Item Serial No.  Lot No. LRB No. Used Action   CATH PORT POWERPORT CLEARVUE ISP 8FR [id number] Catheter CATH PORT POWERPORT CLEARVUE ISP 8FR [id number]  CR [facility] [id number] Right [id number] Implanted

## 2020-03-17 NOTE — ANESTHESIA CARE TRANSFER NOTE
Patient: Pradeep Figueroa Jr.    Procedure(s):  INSERTION, VASCULAR ACCESS PORT RIGHT INTERNAL JUGULAR    Diagnosis: Mesothelioma (H) [C45.9]  Diagnosis Additional Information: No value filed.    Anesthesia Type:   MAC     Note:  Airway :Room Air  Patient transferred to:Phase II  Handoff Report: Identifed the Patient, Identified the Reponsible Provider, Reviewed the pertinent medical history, Discussed the surgical course, Reviewed Intra-OP anesthesia mangement and issues during anesthesia, Set expectations for post-procedure period and Allowed opportunity for questions and acknowledgement of understanding      Vitals: (Last set prior to Anesthesia Care Transfer)    CRNA VITALS  3/17/2020 1220 - 3/17/2020 1254      3/17/2020             Pulse:  97    SpO2:  100 %                Electronically Signed By: TOY Mercedes CRNA  March 17, 2020  12:54 PM

## 2020-03-17 NOTE — DISCHARGE INSTRUCTIONS
[facility]  Same-Day Surgery   Adult Discharge Orders & Instructions   For 24 hours after surgery  1. Get plenty of rest.  A responsible adult must stay with you for at least 24 hours after you leave the hospital.   2. Do not drive or use heavy equipment.  If you have weakness or tingling, don't drive or use heavy equipment until this feeling goes away.  3. Do not drink alcohol.  4. Avoid strenuous or risky activities.  Ask for help when climbing stairs.   5. You may feel lightheaded.  IF so, sit for a few minutes before standing.  Have someone help you get up.   6. If you have nausea (feel sick to your stomach): Drink only clear liquids such as apple juice, ginger ale, broth or 7-Up.  Rest may also help.  Be sure to drink enough fluids.  Move to a regular diet as you feel able.  7. You may have a slight fever. Call the doctor if your fever is over 100 F (37.7 C) (taken under the tongue) or lasts longer than 24 hours.  8. You may have a dry mouth, a sore throat, muscle aches or trouble sleeping.  These should go away after 24 hours.  9. Do not make important or legal decisions.   Call your doctor for any of the followin.  Signs of infection (fever, growing tenderness at the surgery site, a large amount of drainage or bleeding, severe pain, foul-smelling drainage, redness, swelling).    2. It has been over 8 to 10 hours since surgery and you are still not able to urinate (pass water).    3.  Headache for over 24 hours.    4.  Numbness, tingling or weakness the day after surgery (if you had spinal anesthesia).  To contact a doctor, call ___________________________

## 2020-03-18 ENCOUNTER — INFUSION THERAPY VISIT (OUTPATIENT)
Dept: INFUSION THERAPY | Facility: CLINIC | Age: 75
End: 2020-03-18
Payer: MEDICARE

## 2020-03-18 ENCOUNTER — ONCOLOGY VISIT (OUTPATIENT)
Dept: ONCOLOGY | Facility: CLINIC | Age: 75
End: 2020-03-18
Payer: MEDICARE

## 2020-03-18 VITALS
WEIGHT: 125.3 LBS | OXYGEN SATURATION: 96 % | DIASTOLIC BLOOD PRESSURE: 74 MMHG | SYSTOLIC BLOOD PRESSURE: 123 MMHG | TEMPERATURE: 97.6 F | RESPIRATION RATE: 16 BRPM | BODY MASS INDEX: 20.85 KG/M2 | HEART RATE: 83 BPM

## 2020-03-18 DIAGNOSIS — C45.0 MESOTHELIOMA (PLEURAL) (H): Primary | ICD-10-CM

## 2020-03-18 LAB
ALBUMIN UR-MCNC: 10 MG/DL
ANION GAP SERPL CALCULATED.3IONS-SCNC: 4 MMOL/L (ref 3–14)
BASOPHILS # BLD AUTO: 0.1 10E9/L (ref 0–0.2)
BASOPHILS NFR BLD AUTO: 0.9 %
BUN SERPL-MCNC: 22 MG/DL (ref 7–30)
CALCIUM SERPL-MCNC: 9.2 MG/DL (ref 8.5–10.1)
CHLORIDE SERPL-SCNC: 106 MMOL/L (ref 94–109)
CO2 SERPL-SCNC: 28 MMOL/L (ref 20–32)
CREAT SERPL-MCNC: 0.83 MG/DL (ref 0.66–1.25)
DIFFERENTIAL METHOD BLD: ABNORMAL
EOSINOPHIL # BLD AUTO: 0.1 10E9/L (ref 0–0.7)
EOSINOPHIL NFR BLD AUTO: 1.1 %
ERYTHROCYTE [DISTWIDTH] IN BLOOD BY AUTOMATED COUNT: 14.4 % (ref 10–15)
GFR SERPL CREATININE-BSD FRML MDRD: 85 ML/MIN/{1.73_M2}
GLUCOSE SERPL-MCNC: 118 MG/DL (ref 70–99)
HCT VFR BLD AUTO: 36.9 % (ref 40–53)
HGB BLD-MCNC: 11.7 G/DL (ref 13.3–17.7)
IMM GRANULOCYTES # BLD: 0 10E9/L (ref 0–0.4)
IMM GRANULOCYTES NFR BLD: 0.3 %
LYMPHOCYTES # BLD AUTO: 1 10E9/L (ref 0.8–5.3)
LYMPHOCYTES NFR BLD AUTO: 12.1 %
MAGNESIUM SERPL-MCNC: 2.4 MG/DL (ref 1.6–2.3)
MCH RBC QN AUTO: 26.3 PG (ref 26.5–33)
MCHC RBC AUTO-ENTMCNC: 31.7 G/DL (ref 31.5–36.5)
MCV RBC AUTO: 83 FL (ref 78–100)
MONOCYTES # BLD AUTO: 1.1 10E9/L (ref 0–1.3)
MONOCYTES NFR BLD AUTO: 13.3 %
NEUTROPHILS # BLD AUTO: 5.7 10E9/L (ref 1.6–8.3)
NEUTROPHILS NFR BLD AUTO: 72.3 %
PLATELET # BLD AUTO: 311 10E9/L (ref 150–450)
POTASSIUM SERPL-SCNC: 4.2 MMOL/L (ref 3.4–5.3)
RBC # BLD AUTO: 4.45 10E12/L (ref 4.4–5.9)
SODIUM SERPL-SCNC: 138 MMOL/L (ref 133–144)
WBC # BLD AUTO: 7.9 10E9/L (ref 4–11)

## 2020-03-18 PROCEDURE — 96413 CHEMO IV INFUSION 1 HR: CPT | Performed by: INTERNAL MEDICINE

## 2020-03-18 PROCEDURE — 83735 ASSAY OF MAGNESIUM: CPT | Performed by: INTERNAL MEDICINE

## 2020-03-18 PROCEDURE — 96375 TX/PRO/DX INJ NEW DRUG ADDON: CPT | Performed by: INTERNAL MEDICINE

## 2020-03-18 PROCEDURE — 99207 ZZC NO CHARGE LOS: CPT

## 2020-03-18 PROCEDURE — 85025 COMPLETE CBC W/AUTO DIFF WBC: CPT | Performed by: INTERNAL MEDICINE

## 2020-03-18 PROCEDURE — 96417 CHEMO IV INFUS EACH ADDL SEQ: CPT | Performed by: INTERNAL MEDICINE

## 2020-03-18 PROCEDURE — 80048 BASIC METABOLIC PNL TOTAL CA: CPT | Performed by: INTERNAL MEDICINE

## 2020-03-18 PROCEDURE — 99207 ZZC NO CHARGE NURSE ONLY: CPT

## 2020-03-18 PROCEDURE — 81003 URINALYSIS AUTO W/O SCOPE: CPT | Performed by: INTERNAL MEDICINE

## 2020-03-18 PROCEDURE — 96411 CHEMO IV PUSH ADDL DRUG: CPT | Performed by: INTERNAL MEDICINE

## 2020-03-18 RX ORDER — ONDANSETRON 2 MG/ML
8 INJECTION INTRAMUSCULAR; INTRAVENOUS EVERY 6 HOURS PRN
Status: DISCONTINUED | OUTPATIENT
Start: 2020-03-18 | End: 2020-03-18 | Stop reason: HOSPADM

## 2020-03-18 RX ORDER — ONDANSETRON 2 MG/ML
8 INJECTION INTRAMUSCULAR; INTRAVENOUS EVERY 6 HOURS PRN
Status: CANCELLED
Start: 2020-03-18

## 2020-03-18 RX ORDER — HEPARIN SODIUM (PORCINE) LOCK FLUSH IV SOLN 100 UNIT/ML 100 UNIT/ML
5 SOLUTION INTRAVENOUS
Status: DISCONTINUED | OUTPATIENT
Start: 2020-03-18 | End: 2020-03-18 | Stop reason: HOSPADM

## 2020-03-18 RX ORDER — ONDANSETRON 2 MG/ML
4 INJECTION INTRAMUSCULAR; INTRAVENOUS EVERY 6 HOURS PRN
Status: CANCELLED
Start: 2020-03-18

## 2020-03-18 RX ADMIN — HEPARIN SODIUM (PORCINE) LOCK FLUSH IV SOLN 100 UNIT/ML 5 ML: 100 SOLUTION at 08:42

## 2020-03-18 RX ADMIN — Medication 250 ML: at 09:59

## 2020-03-18 RX ADMIN — ONDANSETRON 8 MG: 2 INJECTION INTRAMUSCULAR; INTRAVENOUS at 10:01

## 2020-03-18 RX ADMIN — HEPARIN SODIUM (PORCINE) LOCK FLUSH IV SOLN 100 UNIT/ML 5 ML: 100 SOLUTION at 12:09

## 2020-03-18 ASSESSMENT — PAIN SCALES - GENERAL: PAINLEVEL: NO PAIN (0)

## 2020-03-18 NOTE — PROGRESS NOTES
Oral Anti-Emetic Teaching    Called patient to go over anti-emetics prochlorperazine, lorazepam in addition to dexamethasone. Educated on the use of these medications and non medication ways to prevent nausea. Educated to call triage line if patient is not able to control nausea and/or vomits. Patient verbalized understanding and all questions were answered.    Philly Gutierrez, Pharm. D., [facility]  3/18/2020

## 2020-03-18 NOTE — PROGRESS NOTES
Reports B12 shot at the [facility] 2 weeks ago by Dr. Breen's nurse. Completed day. Port needle left accessed for treatment. Tolerated port access and draw without complaint. Port site scrubbed with Chloraprep for 30 seconds and allowed to dry completely prior to dressing application. Accessed using sterile technique. [place] tubes drawn-Red gel/Green/Purple tubes. Double signed by patient and RN. See documentation flowsheet. Ericka Hewitt, RN, BSN, OCN

## 2020-03-18 NOTE — PROGRESS NOTES
Infusion Nursing Note:  Pradeep Figueroa JrFaye presents today for C1D1 Carboplatin, Alimta, Bevacizumab-awwb.  Did not get B12 injection today, as he received one on 3/4/2020.  Patient seen by provider today: No   present during visit today: Not Applicable.    Note:  Oriented patient to the infusion department, including how/when to use the call light.    Denies any baseline ringing in ears, HTN, clotting disorders, or kidney dysfunction.    Patient started taking Folic Acid daily on approximately 3/7/2020.    Had not received oral Decadron prescription and thus did not take yesterday or this morning.  Received Decadron, Lorazepam, Compazine and Ativan prescriptions from our pharmacists.  Pharmacist visited with patient (via phone due to Covid prevention recommendations) to educate patient how to take medications.    Wanted to know when to follow up and have CT scan. Informed patient of plan for 2 cycles of chemo, then having CT scan in 7 weeks and seeing Dr Poole a couple of days later for results/plan. Informed patient that appointments have been made and he should    Intravenous Access:  Implanted Port. Mildly sore from just being placed yesterday.    Treatment Conditions:  Lab Results   Component Value Date    HGB 11.7 03/18/2020     Lab Results   Component Value Date    WBC 7.9 03/18/2020      Lab Results   Component Value Date    ANEU 5.7 03/18/2020     Lab Results   Component Value Date     03/18/2020      Lab Results   Component Value Date     03/18/2020                   Lab Results   Component Value Date    POTASSIUM 4.2 03/18/2020           Lab Results   Component Value Date    MAG 2.4 03/18/2020            Lab Results   Component Value Date    CR 0.83 03/18/2020                   Lab Results   Component Value Date    DIMA 9.2 03/18/2020                  Results reviewed, labs MET treatment parameters, ok to proceed with treatment.    Urine protein 10.      Post Infusion  Assessment:  Patient tolerated infusion without incident.  Blood return noted pre and post infusion.  Site patent and intact, free from redness, edema or discomfort.  No evidence of extravasations.  Access discontinued per protocol.       Discharge Plan:   Patient will return 4/8/2020 for next appointment.   Patient discharged in stable condition accompanied by: self.  Departure Mode: Ambulatory.    Carline Hauser RN

## 2020-03-20 DIAGNOSIS — C45.0 MESOTHELIOMA (PLEURAL) (H): Primary | ICD-10-CM

## 2020-03-20 LAB
COPATH REPORT: NORMAL
COPATH REPORT: NORMAL

## 2020-03-21 ENCOUNTER — NURSE TRIAGE (OUTPATIENT)
Dept: NURSING | Facility: CLINIC | Age: 75
End: 2020-03-21

## 2020-03-21 NOTE — TELEPHONE ENCOUNTER
Wife Nataliia is calling regarding a feeding tube.  First chemo was done on Wednesday.  Pradeep has seizures.  Pradeep has Mesothelioma.  Today Nataliia has questions on feeding tube and hydration.      Additional Information    Negative: Sounds like a life-threatening emergency to the triager    Negative: Information only call about a Well Adult (no illness or injury)    Negative: Nursing judgment    Negative: Nursing judgment    Negative: Nursing judgment    Negative: Nursing judgment    Negative: Nursing judgment    Negative: Nursing judgment    Negative: Patient wants to be seen    Negative: Nursing judgment    Negative: Nursing judgment    Negative: Nursing judgment    Negative: Nursing judgment    Negative: Nursing judgment    Patient's symptoms are safe to treat at home per nursing judgment    Protocols used: NO PROTOCOL AVAILABLE - SICK ADULT-A-OH

## 2020-03-23 ENCOUNTER — TELEPHONE (OUTPATIENT)
Dept: NUTRITION | Facility: CLINIC | Age: 75
End: 2020-03-23

## 2020-03-23 NOTE — PROGRESS NOTES
Nutrition Services:     Corresponded with Nataliia on Friday via email.  Nataliia was requesting help to get in touch with Dr. Breen and/or his nurse to request a PEG tube for Yossi.      RD connected Nataliia with [facility], Chika Zuñiga.  Chika got in touch with Nataliia.      RD called Nataliia today to offer additional help and to answer questions regarding nutrition and feeding tubes.  RD met with patient for initial assessment on 3/11.      Magaly Breaux RDN, [facility] [place]  [phone]

## 2020-03-24 ENCOUNTER — PREP FOR PROCEDURE (OUTPATIENT)
Dept: SURGERY | Facility: CLINIC | Age: 75
End: 2020-03-24

## 2020-03-24 ENCOUNTER — TELEPHONE (OUTPATIENT)
Dept: SURGERY | Facility: CLINIC | Age: 75
End: 2020-03-24

## 2020-03-24 DIAGNOSIS — C45.9 MESOTHELIOMA (H): ICD-10-CM

## 2020-03-24 DIAGNOSIS — E46 MALNUTRITION, UNSPECIFIED TYPE (H): Primary | ICD-10-CM

## 2020-03-24 RX ORDER — CEFAZOLIN SODIUM 2 G/50ML
2 SOLUTION INTRAVENOUS
Status: CANCELLED | OUTPATIENT
Start: 2020-03-24

## 2020-03-24 RX ORDER — CEFAZOLIN SODIUM 1 G/50ML
1 INJECTION, SOLUTION INTRAVENOUS SEE ADMIN INSTRUCTIONS
Status: CANCELLED | OUTPATIENT
Start: 2020-03-24

## 2020-03-24 NOTE — TELEPHONE ENCOUNTER
Spoke with patient to schedule procedure with Dr. Morales   Procedure was scheduled on 3/31/2020 at [facility]  Patient will have H&P with PAC on 3/27/2020  Patient is aware a / is needed day of surgery.   Surgery letter was sent via [employer], patient has my direct contact information for any further questions.

## 2020-03-25 ENCOUNTER — PATIENT OUTREACH (OUTPATIENT)
Dept: ONCOLOGY | Facility: CLINIC | Age: 75
End: 2020-03-25

## 2020-03-25 NOTE — PROGRESS NOTES
Patient and wife called and advised of patient learning center appt after peg tube placement on 3/31.  Time and place given.  Patient indicated understanding.      Chika Zuñiga MBA, MSN, RN, ONC  RN Care Coordinator  [facility]

## 2020-03-26 ENCOUNTER — ONCOLOGY VISIT (OUTPATIENT)
Dept: ONCOLOGY | Facility: CLINIC | Age: 75
End: 2020-03-26
Attending: INTERNAL MEDICINE
Payer: MEDICARE

## 2020-03-26 DIAGNOSIS — E46 MALNUTRITION, UNSPECIFIED TYPE (H): ICD-10-CM

## 2020-03-26 DIAGNOSIS — K22.0 ACHALASIA: ICD-10-CM

## 2020-03-26 DIAGNOSIS — C45.9 MESOTHELIOMA (H): Primary | ICD-10-CM

## 2020-03-26 PROCEDURE — 40000114 ZZH STATISTIC NO CHARGE CLINIC VISIT: Mod: ZF | Performed by: DIETITIAN, REGISTERED

## 2020-03-26 PROCEDURE — 99207 ZZC NO BILLABLE SERVICE THIS VISIT: CPT | Mod: ZP | Performed by: DIETITIAN, REGISTERED

## 2020-03-26 NOTE — LETTER
3/26/2020       RE: Pradeep Figueroa .  [address] [place] [address]     Dear Colleague,    Thank you for referring your patient, Pradeep Figueroa Jr., to the [facility]. Please see a copy of my visit note below.    Nutrition Services:     RD called Yossi's wife, Nataliia, today to check in on PO intake.   Yossi is scheduled for PEG tube on 3/31 along with Tube feeding edc from [facility].      Nataliia tells me that he has not been able to take solid foods or full liquids for the past week.    He has only been able to take small bites/sips of broth, juiced foods.  She expresses her concern with dehydration and malnutrition.      Due to Yossi's inability to meet his nutrition needs via PO intake, recommend EN for >90 days, indefinite period of time.     RECOMMENDATIONS FOR MD/PROVIDER TO ORDER:   Enteral Nutrition   Formula: Isosource 1.5 shadi  Volume: 6 cartons/day (1500mL, 1140ml free water)  Provisions:  2250kcal (34kcal/kg), 102g protein (1.5g/kg), 264g CHO, 22g fiber      Suggested Tube feeding schedule via gravity feedings  Day 1: 1/2 carton formula TID spread 3-4 hours apart   Day 2: 1 carton formula TID spread 3-4 hours apart   Day 3: 1 1/2cartons formula TID spread 3-4 hours apart    Day 4: 2 cartons formula TID spread 3-4 hours apart    Flush with 30-60mL water before and after each feeding  Flush with additional 120 mL water QID to meet 100% hydration        RD to follow-up in 1-2 weeks.  - EN access/intake  - EN tolerance  - Weight trends    Magaly Breaux RDN, LDN  [facility]  [phone]

## 2020-03-26 NOTE — PROGRESS NOTES
Nutrition Services:     RD called Yossi's wife, Nataliia, today to check in on PO intake.   Yossi is scheduled for PEG tube on 3/31 along with Tube feeding edc from [facility].      Nataliia tells me that he has not been able to take solid foods or full liquids for the past week.    He has only been able to take small bites/sips of broth, juiced foods.  She expresses her concern with dehydration and malnutrition.      Due to Yossi's inability to meet his nutrition needs via PO intake, recommend EN for >90 days, indefinite period of time.     RECOMMENDATIONS FOR MD/PROVIDER TO ORDER:   Enteral Nutrition   Formula: Isosource 1.5 shadi  Volume: 5 cartons/day (1250mL, 950 ml free water)  Provisions:  1875kcal (33kcal/kg), 85 g protein (1.5g/kg), 220g CHO, 19g fiber  Dosing wt: 56kg      Suggested Tube feeding schedule  Day 1: 1 carton formula TID spread 3-4 hours apart   Day 2: 1 1/2 cartons formula TID spread 3-4 hours apart   Day 3:  1 1/2 cartons formula TID; plus add'l 1/2 carton as 4th feeding   spread 3-4 hours apart    Flush with 60mL water before and after each feeding  Flush with additional 120 mL water QID to meet 100% hydration            RD to follow-up in 1-2 weeks.  - EN access/intake  - EN tolerance  - Weight trends    Magaly Breaux RDN, LDN  [facility]  [phone]

## 2020-03-30 ENCOUNTER — ANESTHESIA EVENT (OUTPATIENT)
Dept: SURGERY | Facility: CLINIC | Age: 75
End: 2020-03-30
Payer: MEDICARE

## 2020-03-30 ASSESSMENT — ENCOUNTER SYMPTOMS: SEIZURES: 1

## 2020-03-30 NOTE — ANESTHESIA PREPROCEDURE EVALUATION
"Anesthesia Pre-Procedure Evaluation    Patient: Pradeep Figueroa Jr.   MRN:     [id number] Gender:   male   Age:    74 year old :      1945        Preoperative Diagnosis: Malnutrition, unspecified type (H) [E46]  Mesothelioma (H) [C45.9]   Procedure(s):  INSERTION, GASTROSTOMY TUBE, PERCUTANEOUS  INSERTION, GASTROSTOMY TUBE, LAPAROSCOPY-ASSISTED     LABS:  CBC:   Lab Results   Component Value Date    WBC 7.9 2020    WBC 7.4 2020    HGB 11.7 (L) 2020    HGB 12.4 (L) 2020    HCT 36.9 (L) 2020    HCT 39.7 (L) 2020     2020     2020     BMP:   Lab Results   Component Value Date     2020    POTASSIUM 4.2 2020    CHLORIDE 106 2020    CO2 28 2020    BUN 22 2020    BUN 18 2020    CR 0.83 2020     (H) 2020     COAGS:   Lab Results   Component Value Date    INR 1.13 2020     POC:   Lab Results   Component Value Date     (H) 2020     OTHER:   Lab Results   Component Value Date    DIMA 9.2 2020    MAG 2.4 (H) 2020        Preop Vitals    BP Readings from Last 3 Encounters:   20 123/74   20 115/60   20 135/77    Pulse Readings from Last 3 Encounters:   20 83   20 84   20 83      Resp Readings from Last 3 Encounters:   20 16   20 16   20 14    SpO2 Readings from Last 3 Encounters:   20 96%   20 96%   20 97%      Temp Readings from Last 1 Encounters:   20 36.4  C (97.6  F) (Oral)    Ht Readings from Last 1 Encounters:   20 1.651 m (5' 5\")      Wt Readings from Last 1 Encounters:   20 56.8 kg (125 lb 4.8 oz)    Estimated body mass index is 20.85 kg/m  as calculated from the following:    Height as of 3/11/20: 1.651 m (5' 5\").    Weight as of 3/18/20: 56.8 kg (125 lb 4.8 oz).     LDA:  Peripheral IV 20 Left Upper forearm (Active)   Number of days: 13       Port A Cath Single 20 " Right Chest wall (Active)   Number of days: 13        Past Medical History:   Diagnosis Date     Mesothelioma (H)      Seizures (H)       Past Surgical History:   Procedure Laterality Date     ENDOSCOPIC ULTRASOUND UPPER GASTROINTESTINAL TRACT (GI) N/A 2/20/2020    Procedure: ENDOSCOPIC ULTRASOUND WITH FINE NEEDLE ASPIRATION, ESOPHAGOSCOPY / UPPER GASTROINTESTINAL TRACT (GI) WITH BIOPSY  BIOPSY;  Surgeon: Salazar Sepulveda MD;  Location: [facility] OR     INSERT PORT VASCULAR ACCESS Right 3/17/2020    Procedure: INSERTION, VASCULAR ACCESS PORT RIGHT INTERNAL JUGULAR;  Surgeon: Sandeep Ramirez DO;  Location: [facility] OR      No Known Allergies     Anesthesia Evaluation     . Pt has had prior anesthetic. Type: General    No history of anesthetic complications          ROS/MED HX    ENT/Pulmonary: Comment: Mesothelioma  ALVARADO/ SOB unchanged--improves w/ neb      Neurologic:     (+)seizures last seizure: 2013 other neuro tremor    Cardiovascular:     (+) Dyslipidemia, ----. : . . . :. .       METS/Exercise Tolerance:     Hematologic:     (+) Anemia, -      Musculoskeletal:  - neg musculoskeletal ROS       GI/Hepatic: Comment: Achalasia   Malnutrition         Renal/Genitourinary:     (+) Nephrolithiasis ,       Endo:  - neg endo ROS       Psychiatric:  - neg psychiatric ROS       Infectious Disease:  - neg infectious disease ROS       Malignancy:      - no malignancy   Other:                         PHYSICAL EXAM:   Mental Status/Neuro: A/A/O   Airway: Facies: Feasible  Mallampati: I  Mouth/Opening: Full  TM distance: > 6 cm  Neck ROM: Full   Respiratory: Auscultation: CTAB     Resp. Rate: Normal     Resp. Effort: Normal      CV: Rhythm: Regular  Rate: Age appropriate  Heart: Normal Sounds  Edema: None   Comments:      Dental: Normal Dentition                Assessment:   ASA SCORE: 2    H&P: History and physical reviewed and following examination; no interval change.   Smoking Status:  Non-Smoker/Unknown   NPO Status: NPO  Appropriate     Plan:   Anes. Type:  General   Pre-Medication: None (duoneb)   Induction:  IV (RSI)   Airway: ETT; Oral   Access/Monitoring: PIV   Maintenance: Balanced     Postop Plan:   Postop Pain: Opioids  Postop Sedation/Airway: Not planned  Disposition: Outpatient     CONSENT: Direct conversation   Plan and risks discussed with: Patient   Blood Products: Consent Deferred (Minimal Blood Loss)       Comments for Plan/Consent:  02/20/20; 1258; Mask Ventilation: Easy; Ease of Intubation: Easy; Airway Size: 7.5;  Cuffed;  Oral;  Blade Type: Tubbs;  Blade Size: 2;  Place by: Eustice;  Insertion Attempts: 1;  Secured at (cm)to lip: 22 cm;  Breath Sounds: Equal, clear and bilateral;  End Tidal CO2: Present;  Dentition: Intact, Unchanged;  Grade View of Cords: 1                  Aurora Jauregui MD

## 2020-03-31 ENCOUNTER — ANESTHESIA (OUTPATIENT)
Dept: SURGERY | Facility: CLINIC | Age: 75
End: 2020-03-31
Payer: MEDICARE

## 2020-03-31 ENCOUNTER — HOSPITAL ENCOUNTER (OUTPATIENT)
Facility: CLINIC | Age: 75
Discharge: HOME | End: 2020-03-31
Attending: STUDENT IN AN ORGANIZED HEALTH CARE EDUCATION/TRAINING PROGRAM | Admitting: STUDENT IN AN ORGANIZED HEALTH CARE EDUCATION/TRAINING PROGRAM
Payer: MEDICARE

## 2020-03-31 ENCOUNTER — HOSPITAL ENCOUNTER (OUTPATIENT)
Dept: EDUCATION SERVICES | Facility: CLINIC | Age: 75
End: 2020-03-31
Attending: STUDENT IN AN ORGANIZED HEALTH CARE EDUCATION/TRAINING PROGRAM | Admitting: STUDENT IN AN ORGANIZED HEALTH CARE EDUCATION/TRAINING PROGRAM
Payer: MEDICARE

## 2020-03-31 ENCOUNTER — HOME INFUSION (PRE-WILLOW HOME INFUSION) (OUTPATIENT)
Dept: PHARMACY | Facility: CLINIC | Age: 75
End: 2020-03-31

## 2020-03-31 VITALS
HEIGHT: 65 IN | DIASTOLIC BLOOD PRESSURE: 82 MMHG | HEART RATE: 86 BPM | WEIGHT: 120.37 LBS | SYSTOLIC BLOOD PRESSURE: 146 MMHG | TEMPERATURE: 97.8 F | OXYGEN SATURATION: 96 % | RESPIRATION RATE: 16 BRPM | BODY MASS INDEX: 20.06 KG/M2

## 2020-03-31 DIAGNOSIS — E46 MALNUTRITION, UNSPECIFIED TYPE (H): ICD-10-CM

## 2020-03-31 DIAGNOSIS — C45.9 MESOTHELIOMA (H): ICD-10-CM

## 2020-03-31 LAB
GLUCOSE BLDC GLUCOMTR-MCNC: 96 MG/DL (ref 70–99)
INTERPRETATION ECG - MUSE: NORMAL

## 2020-03-31 PROCEDURE — 25000125 ZZHC RX 250: Performed by: STUDENT IN AN ORGANIZED HEALTH CARE EDUCATION/TRAINING PROGRAM

## 2020-03-31 PROCEDURE — 27210794 ZZH OR GENERAL SUPPLY STERILE: Performed by: STUDENT IN AN ORGANIZED HEALTH CARE EDUCATION/TRAINING PROGRAM

## 2020-03-31 PROCEDURE — 93010 ELECTROCARDIOGRAM REPORT: CPT | Mod: 59 | Performed by: INTERNAL MEDICINE

## 2020-03-31 PROCEDURE — 25000125 ZZHC RX 250: Performed by: ANESTHESIOLOGY

## 2020-03-31 PROCEDURE — 37000008 ZZH ANESTHESIA TECHNICAL FEE, 1ST 30 MIN: Performed by: STUDENT IN AN ORGANIZED HEALTH CARE EDUCATION/TRAINING PROGRAM

## 2020-03-31 PROCEDURE — 25800030 ZZH RX IP 258 OP 636: Performed by: NURSE ANESTHETIST, CERTIFIED REGISTERED

## 2020-03-31 PROCEDURE — 36000051 ZZH SURGERY LEVEL 2 1ST 30 MIN - UMMC: Performed by: STUDENT IN AN ORGANIZED HEALTH CARE EDUCATION/TRAINING PROGRAM

## 2020-03-31 PROCEDURE — 25000128 H RX IP 250 OP 636: Performed by: ANESTHESIOLOGY

## 2020-03-31 PROCEDURE — 93005 ELECTROCARDIOGRAM TRACING: CPT

## 2020-03-31 PROCEDURE — 36000053 ZZH SURGERY LEVEL 2 EA 15 ADDTL MIN - UMMC: Performed by: STUDENT IN AN ORGANIZED HEALTH CARE EDUCATION/TRAINING PROGRAM

## 2020-03-31 PROCEDURE — 37000009 ZZH ANESTHESIA TECHNICAL FEE, EACH ADDTL 15 MIN: Performed by: STUDENT IN AN ORGANIZED HEALTH CARE EDUCATION/TRAINING PROGRAM

## 2020-03-31 PROCEDURE — 71000014 ZZH RECOVERY PHASE 1 LEVEL 2 FIRST HR: Performed by: STUDENT IN AN ORGANIZED HEALTH CARE EDUCATION/TRAINING PROGRAM

## 2020-03-31 PROCEDURE — 25000566 ZZH SEVOFLURANE, EA 15 MIN: Performed by: STUDENT IN AN ORGANIZED HEALTH CARE EDUCATION/TRAINING PROGRAM

## 2020-03-31 PROCEDURE — 40000065 ZZH STATISTIC EKG NON-CHARGEABLE

## 2020-03-31 PROCEDURE — 25000128 H RX IP 250 OP 636: Performed by: STUDENT IN AN ORGANIZED HEALTH CARE EDUCATION/TRAINING PROGRAM

## 2020-03-31 PROCEDURE — 40000170 ZZH STATISTIC PRE-PROCEDURE ASSESSMENT II: Performed by: STUDENT IN AN ORGANIZED HEALTH CARE EDUCATION/TRAINING PROGRAM

## 2020-03-31 PROCEDURE — 71000027 ZZH RECOVERY PHASE 2 EACH 15 MINS: Performed by: STUDENT IN AN ORGANIZED HEALTH CARE EDUCATION/TRAINING PROGRAM

## 2020-03-31 PROCEDURE — 82962 GLUCOSE BLOOD TEST: CPT

## 2020-03-31 PROCEDURE — 25000125 ZZHC RX 250: Performed by: NURSE ANESTHETIST, CERTIFIED REGISTERED

## 2020-03-31 PROCEDURE — 25000128 H RX IP 250 OP 636: Performed by: NURSE ANESTHETIST, CERTIFIED REGISTERED

## 2020-03-31 RX ORDER — FENTANYL CITRATE 50 UG/ML
INJECTION, SOLUTION INTRAMUSCULAR; INTRAVENOUS PRN
Status: DISCONTINUED | OUTPATIENT
Start: 2020-03-31 | End: 2020-03-31

## 2020-03-31 RX ORDER — SODIUM CHLORIDE, SODIUM LACTATE, POTASSIUM CHLORIDE, CALCIUM CHLORIDE 600; 310; 30; 20 MG/100ML; MG/100ML; MG/100ML; MG/100ML
INJECTION, SOLUTION INTRAVENOUS CONTINUOUS
Status: DISCONTINUED | OUTPATIENT
Start: 2020-03-31 | End: 2020-03-31 | Stop reason: HOSPADM

## 2020-03-31 RX ORDER — MEPERIDINE HYDROCHLORIDE 25 MG/ML
12.5 INJECTION INTRAMUSCULAR; INTRAVENOUS; SUBCUTANEOUS
Status: DISCONTINUED | OUTPATIENT
Start: 2020-03-31 | End: 2020-03-31 | Stop reason: HOSPADM

## 2020-03-31 RX ORDER — CEFAZOLIN SODIUM 1 G/3ML
1 INJECTION, POWDER, FOR SOLUTION INTRAMUSCULAR; INTRAVENOUS SEE ADMIN INSTRUCTIONS
Status: DISCONTINUED | OUTPATIENT
Start: 2020-03-31 | End: 2020-03-31 | Stop reason: HOSPADM

## 2020-03-31 RX ORDER — DIMENHYDRINATE 50 MG/ML
25 INJECTION, SOLUTION INTRAMUSCULAR; INTRAVENOUS
Status: DISCONTINUED | OUTPATIENT
Start: 2020-03-31 | End: 2020-03-31 | Stop reason: HOSPADM

## 2020-03-31 RX ORDER — IPRATROPIUM BROMIDE AND ALBUTEROL SULFATE 2.5; .5 MG/3ML; MG/3ML
3 SOLUTION RESPIRATORY (INHALATION)
Status: DISCONTINUED | OUTPATIENT
Start: 2020-03-31 | End: 2020-03-31 | Stop reason: HOSPADM

## 2020-03-31 RX ORDER — LACTOSE-REDUCED FOOD/FIBER 0.07 G-1.5
2 LIQUID (ML) ORAL 3 TIMES DAILY
Qty: 84 CAN | Refills: 11 | COMMUNITY
Start: 2020-03-31

## 2020-03-31 RX ORDER — HEPARIN SODIUM (PORCINE) LOCK FLUSH IV SOLN 100 UNIT/ML 100 UNIT/ML
5 SOLUTION INTRAVENOUS
Status: DISCONTINUED | OUTPATIENT
Start: 2020-03-31 | End: 2020-03-31 | Stop reason: HOSPADM

## 2020-03-31 RX ORDER — LIDOCAINE HYDROCHLORIDE 20 MG/ML
INJECTION, SOLUTION INFILTRATION; PERINEURAL PRN
Status: DISCONTINUED | OUTPATIENT
Start: 2020-03-31 | End: 2020-03-31

## 2020-03-31 RX ORDER — HEPARIN SODIUM,PORCINE 10 UNIT/ML
5-10 VIAL (ML) INTRAVENOUS EVERY 24 HOURS
Status: DISCONTINUED | OUTPATIENT
Start: 2020-03-31 | End: 2020-03-31 | Stop reason: HOSPADM

## 2020-03-31 RX ORDER — ONDANSETRON 2 MG/ML
INJECTION INTRAMUSCULAR; INTRAVENOUS PRN
Status: DISCONTINUED | OUTPATIENT
Start: 2020-03-31 | End: 2020-03-31

## 2020-03-31 RX ORDER — FENTANYL CITRATE 50 UG/ML
25-50 INJECTION, SOLUTION INTRAMUSCULAR; INTRAVENOUS
Status: DISCONTINUED | OUTPATIENT
Start: 2020-03-31 | End: 2020-03-31 | Stop reason: HOSPADM

## 2020-03-31 RX ORDER — PROPOFOL 10 MG/ML
INJECTION, EMULSION INTRAVENOUS PRN
Status: DISCONTINUED | OUTPATIENT
Start: 2020-03-31 | End: 2020-03-31

## 2020-03-31 RX ORDER — LIDOCAINE 40 MG/G
CREAM TOPICAL
Status: DISCONTINUED | OUTPATIENT
Start: 2020-03-31 | End: 2020-03-31 | Stop reason: HOSPADM

## 2020-03-31 RX ORDER — HEPARIN SODIUM,PORCINE 10 UNIT/ML
5-10 VIAL (ML) INTRAVENOUS
Status: DISCONTINUED | OUTPATIENT
Start: 2020-03-31 | End: 2020-03-31 | Stop reason: HOSPADM

## 2020-03-31 RX ORDER — HYDRALAZINE HYDROCHLORIDE 20 MG/ML
2.5-5 INJECTION INTRAMUSCULAR; INTRAVENOUS EVERY 10 MIN PRN
Status: DISCONTINUED | OUTPATIENT
Start: 2020-03-31 | End: 2020-03-31 | Stop reason: HOSPADM

## 2020-03-31 RX ORDER — ALBUTEROL SULFATE 0.83 MG/ML
2.5 SOLUTION RESPIRATORY (INHALATION) EVERY 4 HOURS PRN
Status: DISCONTINUED | OUTPATIENT
Start: 2020-03-31 | End: 2020-03-31 | Stop reason: HOSPADM

## 2020-03-31 RX ORDER — CEFAZOLIN SODIUM 2 G/100ML
2 INJECTION, SOLUTION INTRAVENOUS
Status: COMPLETED | OUTPATIENT
Start: 2020-03-31 | End: 2020-03-31

## 2020-03-31 RX ORDER — SODIUM CHLORIDE, SODIUM LACTATE, POTASSIUM CHLORIDE, CALCIUM CHLORIDE 600; 310; 30; 20 MG/100ML; MG/100ML; MG/100ML; MG/100ML
INJECTION, SOLUTION INTRAVENOUS CONTINUOUS PRN
Status: DISCONTINUED | OUTPATIENT
Start: 2020-03-31 | End: 2020-03-31

## 2020-03-31 RX ORDER — NALOXONE HYDROCHLORIDE 0.4 MG/ML
.1-.4 INJECTION, SOLUTION INTRAMUSCULAR; INTRAVENOUS; SUBCUTANEOUS
Status: DISCONTINUED | OUTPATIENT
Start: 2020-03-31 | End: 2020-03-31 | Stop reason: HOSPADM

## 2020-03-31 RX ORDER — HYDROMORPHONE HYDROCHLORIDE 1 MG/ML
.3-.5 INJECTION, SOLUTION INTRAMUSCULAR; INTRAVENOUS; SUBCUTANEOUS EVERY 10 MIN PRN
Status: DISCONTINUED | OUTPATIENT
Start: 2020-03-31 | End: 2020-03-31 | Stop reason: HOSPADM

## 2020-03-31 RX ORDER — ONDANSETRON 4 MG/1
4 TABLET, ORALLY DISINTEGRATING ORAL EVERY 30 MIN PRN
Status: DISCONTINUED | OUTPATIENT
Start: 2020-03-31 | End: 2020-03-31 | Stop reason: HOSPADM

## 2020-03-31 RX ORDER — METOPROLOL TARTRATE 1 MG/ML
1-2 INJECTION, SOLUTION INTRAVENOUS EVERY 5 MIN PRN
Status: DISCONTINUED | OUTPATIENT
Start: 2020-03-31 | End: 2020-03-31 | Stop reason: HOSPADM

## 2020-03-31 RX ORDER — ONDANSETRON 2 MG/ML
4 INJECTION INTRAMUSCULAR; INTRAVENOUS EVERY 30 MIN PRN
Status: DISCONTINUED | OUTPATIENT
Start: 2020-03-31 | End: 2020-03-31 | Stop reason: HOSPADM

## 2020-03-31 RX ADMIN — FENTANYL CITRATE 25 MCG: 50 INJECTION, SOLUTION INTRAMUSCULAR; INTRAVENOUS at 08:36

## 2020-03-31 RX ADMIN — FENTANYL CITRATE 50 MCG: 50 INJECTION, SOLUTION INTRAMUSCULAR; INTRAVENOUS at 07:57

## 2020-03-31 RX ADMIN — FENTANYL CITRATE 50 MCG: 50 INJECTION, SOLUTION INTRAMUSCULAR; INTRAVENOUS at 07:46

## 2020-03-31 RX ADMIN — SUGAMMADEX 100 MG: 100 INJECTION, SOLUTION INTRAVENOUS at 08:29

## 2020-03-31 RX ADMIN — IPRATROPIUM BROMIDE AND ALBUTEROL SULFATE 3 ML: .5; 3 SOLUTION RESPIRATORY (INHALATION) at 07:11

## 2020-03-31 RX ADMIN — ENOXAPARIN SODIUM 40 MG: 40 INJECTION SUBCUTANEOUS at 06:46

## 2020-03-31 RX ADMIN — CEFAZOLIN SODIUM 2 G: 2 INJECTION, SOLUTION INTRAVENOUS at 08:03

## 2020-03-31 RX ADMIN — ONDANSETRON 4 MG: 2 INJECTION INTRAMUSCULAR; INTRAVENOUS at 08:26

## 2020-03-31 RX ADMIN — LIDOCAINE HYDROCHLORIDE 100 MG: 20 INJECTION, SOLUTION INFILTRATION; PERINEURAL at 07:57

## 2020-03-31 RX ADMIN — ROCURONIUM BROMIDE 60 MG: 10 INJECTION INTRAVENOUS at 07:57

## 2020-03-31 RX ADMIN — PHENYLEPHRINE HYDROCHLORIDE 150 MCG: 10 INJECTION INTRAVENOUS at 08:07

## 2020-03-31 RX ADMIN — Medication 5 ML: at 11:12

## 2020-03-31 RX ADMIN — PROPOFOL 100 MG: 10 INJECTION, EMULSION INTRAVENOUS at 07:57

## 2020-03-31 RX ADMIN — SODIUM CHLORIDE, POTASSIUM CHLORIDE, SODIUM LACTATE AND CALCIUM CHLORIDE: 600; 310; 30; 20 INJECTION, SOLUTION INTRAVENOUS at 07:46

## 2020-03-31 ASSESSMENT — MIFFLIN-ST. JEOR: SCORE: 1202.88

## 2020-03-31 NOTE — DISCHARGE INSTRUCTIONS
REMEMBER: SAME DAY SURGERY IS NOT SAME DAY RECOVERY. TAKE IT EASY, GET PLENTY OF REST, AND HAVE SOMEONE WITH YOU FOR 24 HOURS. FOLLOW THESE INSTRUCTIONS AND PLEASE DO NOT HESITATE TO CALL WITH ANY QUESTIONS.   Understanding PEG Tube Feeding    Healthcare providers use PEG tube feeding (also called percutaneous endoscopic gastrostomy) when you can t swallow food safely or there is a blockage in your esophagus or stomach. Healthcare providers also use the tube if you can t take enough food by mouth. The feeding tube lets food bypass the mouth and esophagus and go directly into your stomach or small intestine.  The path of food  When you take food by mouth, you chew your food into small pieces and swallow. The food moves down your esophagus into your stomach. From there, it goes into your small intestine and then into your large intestine. Solid waste (stool) is stored in your rectum and passed out through the anus.  How a PEG feeding tube is placed  Your healthcare provider places PEG tubes with the aid of a special instrument called an endoscope. This is a long, flexible, lighted tube that allows your healthcare provider to see inside your stomach. Your healthcare provider passes the endoscope through your mouth into your stomach. Your healthcare provider will also make a small surgical cut through your skin and into your stomach. He or she inserts the PEG tube through the opening while watching through the endoscope. A special balloon or cap holds the PEG tube in place inside your stomach. Your healthcare provider places a small dressing at the new opening.  Digestion works the same  Digestion works the same with a feeding tube as it does when you take food by mouth. So you get the same nutrition by tube feeding as you would get by mouth. If you have any questions or concerns about the feeding tube or its care, be certain to ask your provider. If you want a partner or significant other educated along with you  about the feeding tube, let your provider or medical team know.   Date Last Reviewed: 2016-2019 [employer]. [address] [place] [address], [place] [address]. All rights reserved. This information is not intended as a substitute for professional medical care. Always follow your healthcare professional's instructions.      [facility], [place]  Same-Day Surgery   Adult Discharge Orders & Instructions     For 24 hours after surgery    1. Get plenty of rest.  A responsible adult must stay with you for at least 24 hours after you leave the hospital.   2. Do not drive or use heavy equipment.  If you have weakness or tingling, don't drive or use heavy equipment until this feeling goes away.  3. Do not drink alcohol.  4. Avoid strenuous or risky activities.  Ask for help when climbing stairs.   5. You may feel lightheaded.  IF so, sit for a few minutes before standing.  Have someone help you get up.   6. If you have nausea (feel sick to your stomach): Drink only clear liquids such as apple juice, ginger ale, broth or 7-Up.  Rest may also help.  Be sure to drink enough fluids.  Move to a regular diet as you feel able.  7. You may have a slight fever. Call the doctor if your fever is over 100 F (37.7 C) (taken under the tongue) or lasts longer than 24 hours.  8. You may have a dry mouth, a sore throat, muscle aches or trouble sleeping.  These should go away after 24 hours.  9. Do not make important or legal decisions.   Call your doctor for any of the followin.  Signs of infection (fever, growing tenderness at the surgery site, a large amount of drainage or bleeding, severe pain, foul-smelling drainage, redness, swelling).    2. It has been over 8 to 10 hours since surgery and you are still not able to urinate (pass water).    3.  Headache for over 24 hours.    4.  Numbness, tingling or weakness the day after surgery (if you had spinal anesthesia).  To contact a doctor,  call _Dr. Radha Moralse @ [phone] (clinic) or [phone] (office)  _ or:        [phone] and ask for the resident on call for   Surgery (answered 24 hours a day)      Emergency Department:    [facility]: [phone]       (TTY for hearing impaired: [phone])    [facility]: [phone]       (TTY for hearing impaired: [phone])          Activity  - No activity restrictions    Incisions  - You do not have any incisions    Drain Care  - You have a new feeding (gastrostomy) tube that can remain capped; It may be used for feeding as needed beginning 4/1/2020;   -Please keep the site clean and dry  -Please ensure that the gastrostomy tube is not under any tension    Medications  - Do not take any additional Tylenol (acetaminophen) while using a narcotic pain medication which includes acetaminophen  - Do not take more than 4,000mg of acetaminophen in any 24 hour period, as this can cause liver damage  - Take stool softeners such as Senna or Miralax while you are using narcotics, but stop if you develop diarrhea  - Wean yourself off of narcotic pain medications    Follow-Up:  - Call your primary care provider to touch base regarding your recent admission.    - Call or return sooner than your regularly scheduled visit if you develop any of the following: fever >101.5, uncontrolled pain, uncontrolled nausea or vomiting, as well as increased redness, swelling, or drainage from your wound.     -  Follow-up for a feeding tube check will be arranged for you;

## 2020-03-31 NOTE — OR NURSING
0955 patient in phase 2 , settled into chair without difficulty, will continue to monitor . LUCIUS

## 2020-03-31 NOTE — BRIEF OP NOTE
[facility], [place]    Brief Operative Note    Pre-operative diagnosis: Malnutrition, unspecified type (H) [E46]  Mesothelioma (H) [C45.9]  Post-operative diagnosis Same as pre-operative diagnosis    Procedure: Procedure(s):  INSERTION, GASTROSTOMY TUBE, PERCUTANEOUS  Surgeon: Surgeon(s) and Role:     * Radha Morales MD - Primary     * Cesar Patel MD - Fellow - Assisting  Anesthesia: General   Estimated blood loss: None  Drains:   **Gastrostomy tube    Specimens: * No specimens in log *  Findings:   None.  Complications: None.  Implants: * No implants in log *

## 2020-03-31 NOTE — OR NURSING
1048 called discharge instructions to wife Nataliia , verbal understanding given. She will pick  up in front of hospital at 1300 for appointment at the [facility] for teaching on tube feeding . LUCIUS

## 2020-03-31 NOTE — ANESTHESIA CARE TRANSFER NOTE
Patient: Pradeep Figueroa Jr.    Procedure(s):  INSERTION, GASTROSTOMY TUBE, PERCUTANEOUS    Diagnosis: Malnutrition, unspecified type (H) [E46]  Mesothelioma (H) [C45.9]  Diagnosis Additional Information: No value filed.    Anesthesia Type:   General     Note:  Airway :Nasal Cannula  Patient transferred to:PACU  Comments: VSS. Ventilating well.Handoff Report: Identifed the Patient, Identified the Reponsible Provider, Reviewed the pertinent medical history, Discussed the surgical course, Reviewed Intra-OP anesthesia mangement and issues during anesthesia, Set expectations for post-procedure period and Allowed opportunity for questions and acknowledgement of understanding      Vitals: (Last set prior to Anesthesia Care Transfer)    CRNA VITALS  3/31/2020 0811 - 3/31/2020 0847      3/31/2020             SpO2:  100 %                Electronically Signed By: TOY Lira CRNA  March 31, 2020  8:47 AM

## 2020-03-31 NOTE — CONSULTS
Yossi and his wife came to the [facility] for Gtube and feeding education. Theybwere taught all tube cares and bolus/gravity feeds. No home care is in place so they will call his oncologist if they do not hear anything by tomorrow.    Literature given: Handwashing and Skin Care, Tube Feeding at Home-Bolus Method Using Syringe and Plunger, Tube Feedings at Home-[facility] Method,, Caring for Your Tube at Home.

## 2020-03-31 NOTE — ANESTHESIA POSTPROCEDURE EVALUATION
Anesthesia POST Procedure Evaluation    Patient: Pradeep Figueroa Jr.   MRN:     [id number] Gender:   male   Age:    74 year old :      1945        Preoperative Diagnosis: Malnutrition, unspecified type (H) [E46]  Mesothelioma (H) [C45.9]   Procedure(s):  INSERTION, GASTROSTOMY TUBE, PERCUTANEOUS   Postop Comments: No value filed.     Anesthesia Type: General       Disposition: Outpatient   Postop Pain Control: Uneventful            Sign Out: Well controlled pain   PONV: No   Neuro/Psych: Uneventful            Sign Out: Acceptable/Baseline neuro status   Airway/Respiratory: Uneventful            Sign Out: Acceptable/Baseline resp. status   CV/Hemodynamics: Uneventful            Sign Out: Acceptable CV status   Other NRE: NONE   DID A NON-ROUTINE EVENT OCCUR? No         Last Anesthesia Record Vitals:  CRNA VITALS  3/31/2020 0811 - 3/31/2020 0911      3/31/2020             SpO2:  100 %          Last PACU Vitals:  Vitals Value Taken Time   /75 3/31/2020  9:20 AM   Temp 36.4  C (97.5  F) 3/31/2020  9:15 AM   Pulse 68 3/31/2020  9:20 AM   Resp 12 3/31/2020  9:15 AM   SpO2 96 % 3/31/2020  9:30 AM   Temp src Skin 3/31/2020  8:45 AM   NIBP 141/72 3/31/2020  8:45 AM   Pulse     SpO2 100 % 3/31/2020  8:46 AM   Resp     Temp     Ht Rate 78 3/31/2020  8:45 AM   Temp 2     Vitals shown include unvalidated device data.      Electronically Signed By: Aurora Jauregui MD, 2020, 9:31 AM

## 2020-03-31 NOTE — OP NOTE
DATE OF PROCEDURE:   3/31/20      PREOPERATIVE DIAGNOSIS: Mesothelioma with failure to thrive    POSTOPERATIVE DIAGNOSIS:   Mesothelioma with failure to thrive       PROCEDURE PERFORMED:    1. Esophagogastroscopy  2. Percutaneous Endoscopic Gastrostomy tube insertion    SURGEON:  Radha Morales MD       ASSISTANT SURGEON:    Cesar Patel MD  ANESTHESIA:  General      ESTIMATED BLOOD LOSS:  Minimal.       DESCRIPTION OF PROCEDURE:  After obtaining informed consent, the patient was brought to the operating room and laid supine on the operating table.  After induction of anesthesia and intubation, we proceeded to perform an esophagogastroscopy with a pediatric scope which passed through the esophagus easiliy. An area on the abdominal wall in the left upper quadrant that was easily transilluminated was identified.  A small incision was made here and with endoscopic visualization, a needle was passed through here.  A wire was passed through the needle and using a snare through the gastroscope the wire was pulled out into the mouth.  A 20-Tongan Ponsky PEG tube was then guided back into the stomach using the wire to emerge at the left upper quadrant incision site.  Its position in the stomach was then confirmed with endoscopy and was tightened down, making sure it was neither too tight nor too loose.  It was then secured in place.  The stomach was desufflated.  The patient tolerated the procedure well.

## 2020-04-01 ENCOUNTER — PATIENT OUTREACH (OUTPATIENT)
Dept: ONCOLOGY | Facility: CLINIC | Age: 75
End: 2020-04-01

## 2020-04-01 ENCOUNTER — HOME INFUSION (PRE-WILLOW HOME INFUSION) (OUTPATIENT)
Dept: PHARMACY | Facility: CLINIC | Age: 75
End: 2020-04-01

## 2020-04-01 DIAGNOSIS — E46 MALNUTRITION, UNSPECIFIED TYPE (H): ICD-10-CM

## 2020-04-01 DIAGNOSIS — C45.9 MESOTHELIOMA (H): Primary | ICD-10-CM

## 2020-04-01 DIAGNOSIS — C45.0 MESOTHELIOMA (PLEURAL) (H): Primary | ICD-10-CM

## 2020-04-01 NOTE — PROGRESS NOTES
This is a recent snapshot of the patient's [facility] medical record.  For current drug dose and complete information and questions, call [phone] or In Basket pool, fv home infusion ([id number])  CSN Number:  [id number]

## 2020-04-01 NOTE — PROGRESS NOTES
Faxed clinical notes, dietician notes, and face to face encounter to [facility] attn: Leilani at [phone], and Pratima RN at [facility] at [phone].  They will deliver the enteral feeding supplies to them today and an RN will visit on Saturday.

## 2020-04-02 ENCOUNTER — TELEPHONE (OUTPATIENT)
Dept: NUTRITION | Facility: CLINIC | Age: 75
End: 2020-04-02

## 2020-04-02 NOTE — TELEPHONE ENCOUNTER
Nutrition Services:     Received phone call from Yossi's wife, Nataliia wanting to confirm EN feeding schedule for Yossi.   She tells that he has started his feedings today and received 1/2 carton for breakfast and lunch which as tolerated very well.    She inquires about increasing his dinner feeding to 1 carton.   RD confirmed that this would be okay.     She inquires about total EN volume goal and hydration goal.   Reviewed schedule below.      Suggested Tube feeding schedule  Day 1: 1 carton formula TID spread 3-4 hours apart   Day 2: 1 1/2 cartons formula TID spread 3-4 hours apart   Day 3:  1 1/2 cartons formula TID; plus add'l 1/2 carton as 4th feeding   spread 3-4 hours apart OR 2 carons BID with 1 carton as 3rd feeding   Flush with 60mL water before and after each feeding  Flush with additional 120 mL water 4-6 times/day      Nataliia verbalized her understanding of recommendations provided.    She will reach out with further nutrition/feeding questions or concerns prn.   She expressed her appreciation for help and coordination.     Magaly Breaux RDN, [facility] [place]  [phone]

## 2020-04-02 NOTE — PROGRESS NOTES
This is a recent snapshot of the patient's [facility] medical record.  For current drug dose and complete information and questions, call [phone] or In Basket pool, fv home infusion ([id number])  CSN Number:  [id number]

## 2020-04-04 DIAGNOSIS — C45.0 MESOTHELIOMA (PLEURAL) (H): ICD-10-CM

## 2020-04-06 ENCOUNTER — HOME INFUSION (PRE-WILLOW HOME INFUSION) (OUTPATIENT)
Dept: PHARMACY | Facility: CLINIC | Age: 75
End: 2020-04-06

## 2020-04-06 RX ORDER — CODEINE PHOSPHATE AND GUAIFENESIN 10; 100 MG/5ML; MG/5ML
SOLUTION ORAL
Qty: 180 ML | Refills: 0 | Status: SHIPPED | OUTPATIENT
Start: 2020-04-06 | End: 2020-06-17

## 2020-04-07 NOTE — PROGRESS NOTES
This is a recent snapshot of the patient's [facility] medical record.  For current drug dose and complete information and questions, call [phone] or In Basket pool, fv home infusion ([id number])  CSN Number:  [id number]

## 2020-04-08 ENCOUNTER — INFUSION THERAPY VISIT (OUTPATIENT)
Dept: INFUSION THERAPY | Facility: CLINIC | Age: 75
End: 2020-04-08
Payer: MEDICARE

## 2020-04-08 ENCOUNTER — ONCOLOGY VISIT (OUTPATIENT)
Dept: ONCOLOGY | Facility: CLINIC | Age: 75
End: 2020-04-08
Payer: MEDICARE

## 2020-04-08 ENCOUNTER — DOCUMENTATION ONLY (OUTPATIENT)
Dept: NUTRITION | Facility: CLINIC | Age: 75
End: 2020-04-08

## 2020-04-08 VITALS
BODY MASS INDEX: 19.46 KG/M2 | HEART RATE: 74 BPM | DIASTOLIC BLOOD PRESSURE: 86 MMHG | WEIGHT: 116.8 LBS | SYSTOLIC BLOOD PRESSURE: 125 MMHG | HEIGHT: 65 IN | TEMPERATURE: 98 F | OXYGEN SATURATION: 93 %

## 2020-04-08 DIAGNOSIS — R11.10 REGURGITATION OF STOMACH CONTENTS: ICD-10-CM

## 2020-04-08 DIAGNOSIS — R63.4 WEIGHT LOSS: ICD-10-CM

## 2020-04-08 DIAGNOSIS — C45.0 MESOTHELIOMA (PLEURAL) (H): Primary | ICD-10-CM

## 2020-04-08 DIAGNOSIS — K59.00 CONSTIPATION, UNSPECIFIED CONSTIPATION TYPE: ICD-10-CM

## 2020-04-08 DIAGNOSIS — R11.0 NAUSEA: ICD-10-CM

## 2020-04-08 DIAGNOSIS — Z93.1 PEG (PERCUTANEOUS ENDOSCOPIC GASTROSTOMY) STATUS (H): ICD-10-CM

## 2020-04-08 LAB
ALBUMIN UR-MCNC: NEGATIVE MG/DL
ANION GAP SERPL CALCULATED.3IONS-SCNC: 5 MMOL/L (ref 3–14)
BASOPHILS # BLD AUTO: 0 10E9/L (ref 0–0.2)
BASOPHILS NFR BLD AUTO: 0.4 %
BUN SERPL-MCNC: 19 MG/DL (ref 7–30)
CALCIUM SERPL-MCNC: 9.7 MG/DL (ref 8.5–10.1)
CHLORIDE SERPL-SCNC: 106 MMOL/L (ref 94–109)
CO2 SERPL-SCNC: 29 MMOL/L (ref 20–32)
CREAT SERPL-MCNC: 0.8 MG/DL (ref 0.66–1.25)
DIFFERENTIAL METHOD BLD: ABNORMAL
EOSINOPHIL # BLD AUTO: 0 10E9/L (ref 0–0.7)
EOSINOPHIL NFR BLD AUTO: 0 %
ERYTHROCYTE [DISTWIDTH] IN BLOOD BY AUTOMATED COUNT: 16.4 % (ref 10–15)
GFR SERPL CREATININE-BSD FRML MDRD: 86 ML/MIN/{1.73_M2}
GLUCOSE SERPL-MCNC: 114 MG/DL (ref 70–99)
HCT VFR BLD AUTO: 36.5 % (ref 40–53)
HGB BLD-MCNC: 11.4 G/DL (ref 13.3–17.7)
IMM GRANULOCYTES # BLD: 0.1 10E9/L (ref 0–0.4)
IMM GRANULOCYTES NFR BLD: 0.6 %
LYMPHOCYTES # BLD AUTO: 0.9 10E9/L (ref 0.8–5.3)
LYMPHOCYTES NFR BLD AUTO: 9.7 %
MAGNESIUM SERPL-MCNC: 2.4 MG/DL (ref 1.6–2.3)
MCH RBC QN AUTO: 25.9 PG (ref 26.5–33)
MCHC RBC AUTO-ENTMCNC: 31.2 G/DL (ref 31.5–36.5)
MCV RBC AUTO: 83 FL (ref 78–100)
MONOCYTES # BLD AUTO: 0.9 10E9/L (ref 0–1.3)
MONOCYTES NFR BLD AUTO: 9.9 %
NEUTROPHILS # BLD AUTO: 7.1 10E9/L (ref 1.6–8.3)
NEUTROPHILS NFR BLD AUTO: 79.4 %
PLATELET # BLD AUTO: 458 10E9/L (ref 150–450)
POTASSIUM SERPL-SCNC: 4.4 MMOL/L (ref 3.4–5.3)
RBC # BLD AUTO: 4.4 10E12/L (ref 4.4–5.9)
SODIUM SERPL-SCNC: 140 MMOL/L (ref 133–144)
WBC # BLD AUTO: 9 10E9/L (ref 4–11)

## 2020-04-08 PROCEDURE — 96411 CHEMO IV PUSH ADDL DRUG: CPT | Performed by: NURSE PRACTITIONER

## 2020-04-08 PROCEDURE — 96413 CHEMO IV INFUSION 1 HR: CPT | Performed by: NURSE PRACTITIONER

## 2020-04-08 PROCEDURE — 85025 COMPLETE CBC W/AUTO DIFF WBC: CPT | Performed by: NURSE PRACTITIONER

## 2020-04-08 PROCEDURE — 96417 CHEMO IV INFUS EACH ADDL SEQ: CPT | Performed by: NURSE PRACTITIONER

## 2020-04-08 PROCEDURE — 99207 ZZC NO CHARGE NURSE ONLY: CPT

## 2020-04-08 PROCEDURE — 99214 OFFICE O/P EST MOD 30 MIN: CPT | Mod: 25 | Performed by: NURSE PRACTITIONER

## 2020-04-08 PROCEDURE — 96375 TX/PRO/DX INJ NEW DRUG ADDON: CPT | Performed by: NURSE PRACTITIONER

## 2020-04-08 PROCEDURE — 80048 BASIC METABOLIC PNL TOTAL CA: CPT | Performed by: NURSE PRACTITIONER

## 2020-04-08 PROCEDURE — 83735 ASSAY OF MAGNESIUM: CPT | Performed by: NURSE PRACTITIONER

## 2020-04-08 PROCEDURE — 81003 URINALYSIS AUTO W/O SCOPE: CPT | Performed by: NURSE PRACTITIONER

## 2020-04-08 RX ORDER — ALBUTEROL SULFATE 0.83 MG/ML
2.5 SOLUTION RESPIRATORY (INHALATION)
Status: CANCELLED | OUTPATIENT
Start: 2020-04-08

## 2020-04-08 RX ORDER — ONDANSETRON 2 MG/ML
8 INJECTION INTRAMUSCULAR; INTRAVENOUS ONCE
Status: CANCELLED
Start: 2020-04-08

## 2020-04-08 RX ORDER — NALOXONE HYDROCHLORIDE 0.4 MG/ML
.1-.4 INJECTION, SOLUTION INTRAMUSCULAR; INTRAVENOUS; SUBCUTANEOUS
Status: CANCELLED | OUTPATIENT
Start: 2020-04-08

## 2020-04-08 RX ORDER — HEPARIN SODIUM (PORCINE) LOCK FLUSH IV SOLN 100 UNIT/ML 100 UNIT/ML
5 SOLUTION INTRAVENOUS
Status: DISCONTINUED | OUTPATIENT
Start: 2020-04-08 | End: 2020-04-08 | Stop reason: HOSPADM

## 2020-04-08 RX ORDER — METHYLPREDNISOLONE SODIUM SUCCINATE 125 MG/2ML
125 INJECTION, POWDER, LYOPHILIZED, FOR SOLUTION INTRAMUSCULAR; INTRAVENOUS
Status: CANCELLED
Start: 2020-04-08

## 2020-04-08 RX ORDER — MEPERIDINE HYDROCHLORIDE 25 MG/ML
25 INJECTION INTRAMUSCULAR; INTRAVENOUS; SUBCUTANEOUS EVERY 30 MIN PRN
Status: CANCELLED | OUTPATIENT
Start: 2020-04-08

## 2020-04-08 RX ORDER — ALBUTEROL SULFATE 90 UG/1
1-2 AEROSOL, METERED RESPIRATORY (INHALATION)
Status: CANCELLED
Start: 2020-04-08

## 2020-04-08 RX ORDER — EPINEPHRINE 0.3 MG/.3ML
0.3 INJECTION SUBCUTANEOUS EVERY 5 MIN PRN
Status: CANCELLED | OUTPATIENT
Start: 2020-04-08

## 2020-04-08 RX ORDER — ONDANSETRON 2 MG/ML
4 INJECTION INTRAMUSCULAR; INTRAVENOUS EVERY 6 HOURS PRN
Status: CANCELLED
Start: 2020-04-08

## 2020-04-08 RX ORDER — ONDANSETRON 2 MG/ML
8 INJECTION INTRAMUSCULAR; INTRAVENOUS ONCE
Status: COMPLETED | OUTPATIENT
Start: 2020-04-08 | End: 2020-04-08

## 2020-04-08 RX ORDER — LORAZEPAM 2 MG/ML
0.5 INJECTION INTRAMUSCULAR EVERY 4 HOURS PRN
Status: CANCELLED
Start: 2020-04-08

## 2020-04-08 RX ORDER — DIPHENHYDRAMINE HYDROCHLORIDE 50 MG/ML
50 INJECTION INTRAMUSCULAR; INTRAVENOUS
Status: CANCELLED
Start: 2020-04-08

## 2020-04-08 RX ORDER — SODIUM CHLORIDE 9 MG/ML
1000 INJECTION, SOLUTION INTRAVENOUS CONTINUOUS PRN
Status: CANCELLED
Start: 2020-04-08

## 2020-04-08 RX ORDER — HEPARIN SODIUM (PORCINE) LOCK FLUSH IV SOLN 100 UNIT/ML 100 UNIT/ML
5 SOLUTION INTRAVENOUS ONCE
Status: COMPLETED | OUTPATIENT
Start: 2020-04-08 | End: 2020-04-08

## 2020-04-08 RX ORDER — EPINEPHRINE 1 MG/ML
0.3 INJECTION, SOLUTION INTRAMUSCULAR; SUBCUTANEOUS EVERY 5 MIN PRN
Status: CANCELLED | OUTPATIENT
Start: 2020-04-08

## 2020-04-08 RX ADMIN — Medication 250 ML: at 08:40

## 2020-04-08 RX ADMIN — HEPARIN SODIUM (PORCINE) LOCK FLUSH IV SOLN 100 UNIT/ML 5 ML: 100 SOLUTION at 07:29

## 2020-04-08 RX ADMIN — HEPARIN SODIUM (PORCINE) LOCK FLUSH IV SOLN 100 UNIT/ML 5 ML: 100 SOLUTION at 11:12

## 2020-04-08 RX ADMIN — ONDANSETRON 8 MG: 2 INJECTION INTRAMUSCULAR; INTRAVENOUS at 08:55

## 2020-04-08 ASSESSMENT — MIFFLIN-ST. JEOR: SCORE: 1186.68

## 2020-04-08 ASSESSMENT — PAIN SCALES - GENERAL: PAINLEVEL: NO PAIN (0)

## 2020-04-08 NOTE — PROGRESS NOTES
Nutrition Services:    Email correspondence from CEDRICK to Nataliia 4/8 0850am:    'Good morning, Nataliia & Yossi,  I am looking at Yossi's labs right now. His kidney's look great!    For Nutrition needs:  If Yossi's weight remains stable at 110 lb (home weight) and 116 lb (clinic weight), I would like to see him remain on at least 5 cartons of formula/day. After the next week, he could try adding just 1/2 a carton, totaling 5 1/2 cartons/day  Instead of adding an extra feeding, He could try adding 1/4 carton to 2 of his feedings.   Example:  AM: 2 1/4 cartons (30mL water before/after)  Noon: 2 1/4 cartons (30mL water before/after)  PM: 1 carton (60mL water before/after)    For water flushes/hydration needs:  -Try giving 30ml before and after each feeding to prevent fullness.  -Try giving 60ml 6-8 times/day between feedings    If Yossi is able to drink water, I would like him to try getting at least 2 more cups/day (in addition to his flushes).  Otherwise, these 2 cups could be used for medication flushes/extra flushes.   What I have described above, will meet 100% of his fluid needs (2200mL, ~9 cups).     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 It sounds amazing.   Please keep in touch with any other questions/concerns.   If you have access to [employer], you can send messages that way as well. [employer] is our preferred, secure route for documentation'    Magaly Breaux RDN, LDN  [facility]  [phone]

## 2020-04-08 NOTE — NURSING NOTE
"Oncology Rooming Note    April 8, 2020 7:45 MARYBETH   Pradeep Figueroa Jr. is a 74 year old male who presents for:    No chief complaint on file.    Initial Vitals: /86 (BP Location: Right arm, Patient Position: Chair, Cuff Size: Adult Regular)   Pulse 74   Temp 98  F (36.7  C)   Ht 1.651 m (5' 5\")   Wt 53 kg (116 lb 12.8 oz)   SpO2 93%   BMI 19.44 kg/m   Estimated body mass index is 19.44 kg/m  as calculated from the following:    Height as of this encounter: 1.651 m (5' 5\").    Weight as of this encounter: 53 kg (116 lb 12.8 oz). Body surface area is 1.56 meters squared.  No Pain (0) Comment: Data Unavailable   No LMP for male patient.  Allergies reviewed: Yes  Medications reviewed: Yes    Medications: MEDICATION REFILLS NEEDED TODAY. Provider was notified.  Pharmacy name entered into [employer]: [id number] - [place], [place] - [address]    Clinical concerns: Yes Rajni was notified.      Ewa Paniagua CMA                "

## 2020-04-08 NOTE — PROGRESS NOTES
"Documentation from Yossi's wife, Nataliia, via email to dietitian:    From: Pradeep Mejonnie <[email or web address]>  Subject: Pradeep Figueroa Jr.  50 update   Date: 2020 at 8:57:20 PM CDT  To: \"Magaly Driscoll\" [email or web address]>  Cc: Pradeep Figueroa <[email or web address]>    Magaly:    Yossi is down to 110 pounds (without clothes) and has been maintaining this weight since he started the feeding tube 20. I am sure he weighed more at the Doctor s office. He is about 5 feet, 5 inches. Yossi finds it uncomfortable to try drink a lot of water. Yossi hasn t be able to attain the goal of 2 - 60 ml flushings per meal and 120 mls 4 X a day. It is more like 1 - 60 ml for 3 flushings and approximately 120, 60, 60, & sometimes another 60 totaling 300 mls. Some more water is taken for Miralax usage and other required pills.    Even at this reduced intake, I was saddened to hear him tell the [facility] Nurse today he has to get up about 5 times a night to go the bathroom. Clearly, he is not getting a good nights sleep. He has Chemo tomorrow, () and labs will be done. This should show if the kidneys are working properly.     Yossi is currently getting three feedings of: 2 cartons, 2 cartons and 1 carton.    I am doubtful he would be excited to add another feeding. 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 It would be great if Yossi can do what he enjoys for as long as he can and not be connected to a feeding tube. We realize there may not be any choice in this area.     Any suggestions would be appreciated. Nataliia Henry     Addendum email,  at 6:39am  After discussion with the Nurse from [facility] about Acid reflex, I persuaded Yossi to try one on my Tagamet 200 last night as I also cough a lot if I don t take my acid reflux medication. I wasn t able to " "syringe the full 8 ounces of water containing the Tagamet,so he didn t get full dose.   The good news is he only got up twice last night instead of 5 trips to the bathroom, and he didn t take any cough medication containing codeine.   In the past when it was thought all of his problems were caused by Acid Reflux and not cancer, Yossi was prescribed Prilosec (Omeprazole). He stopped taking it when he learned it was cancer and he was told the Acid Reflux medication wasn t needed. I understand from talking to the Nurse ([facility]), the latest on Prilosec is there is some evidence this is hard on the Kidneys. This is from the Web \"A recent study has shown that Prilosec and other Proton Pump Inhibitor (PPI) medications may increase the risk of developing chronic kidney disease. ... The longer the medication is taken, the greater the risk.  I realize you can t always believe everything on the web but I can t in good conscious give Yossi something they may be harmful. He has enough problems as it is.   I will be giving Yossi Tagamet 200 going forward since it does seem to control the coughing. Please share this note with Dr. Breen.     Sincerely, Nataliia Figueroa.    "

## 2020-04-08 NOTE — PROGRESS NOTES
Oncology Follow Up Visit: April 8, 2020    Oncologist: Dr Myles Breen  PCP: Feliz Majano    Diagnosis: Pleural Mesothelioma  Pradeep Figueroa Jr.is a 75 yo male with history of epilepsy who presented to clinic in 7/2019  With progressive dysphagia. Work up by GI difficult work up with initial finding of achalasia. CT chest/abd/pelvis 2/18/20 showed significant circumferential mid-distal esophageal thickening with upstream dilation which was atypical for type II achalasia.  He was found to have an incidental moderate sized left sided pleural effusion   Along with some thickening in the parietal pleural lining and the right lower lobe and also thickening of the pleural lining in the mediastinum around the esophagus.Further endoscopy and EUS showed esophageal stricture with thickening 30 cm from incisors within mediastinum but outside esophageal wall. Biopsies of the specimen showed Loss of BAP1 protein expression that is  consistent   with malignant mesothelioma, epithelioid type based on this limited biopsy.    Treatment:   3/18/2020 began pemetrexed/carboplatin/bevacizumab    Interval History: Mr Figueroa comes to clinic prior to cycle 2 of pemetrexed/carboplatin/bevacizumab to treat his mesothelioma. Pt state he is feeling well - better since placement of PEG tube and start of Feedings per Gtube. He is also adding fluid and admits he is taking very little by mouth at this time due to throat narrowing.He reports having 3 days of nausea post first chemotherapy and wonders if some regurgitation with stomach pain.  He has also needed to use miralax for some constipation that may have been brought on by pain meds used after PEG Placement. He denies pain today, nausea, SOB fevers, weakness, or neuropathy.    Rest of comprehensive and complete ROS is reviewed and is negative.   Past Medical History:   Diagnosis Date     Mesothelioma (H)      Seizures (H)      Current Outpatient Medications   Medication      "acetaminophen (TYLENOL) 325 MG tablet     albuterol (PROAIR HFA/PROVENTIL HFA/VENTOLIN HFA) 108 (90 Base) MCG/ACT inhaler     alendronate (FOSAMAX) 70 MG tablet     cholecalciferol 25 MCG (1000 UT) TABS     dexamethasone (DECADRON) 4 MG tablet     folic acid (FOLVITE) 1 MG tablet     guaiFENesin-codeine (ROBITUSSIN AC) 100-10 MG/5ML solution     lamoTRIgine (LAMICTAL) 200 MG tablet     LORazepam (ATIVAN) 0.5 MG tablet     Nutritional Supplements (ISOSOURCE 1.5 DIMA) LIQD     oxyCODONE (ROXICODONE) 5 MG tablet     prochlorperazine (COMPAZINE) 10 MG tablet     No current facility-administered medications for this visit.      No Known Allergies    Physical Exam:/86 (BP Location: Right arm, Patient Position: Chair, Cuff Size: Adult Regular)   Pulse 74   Temp 98  F (36.7  C)   Ht 1.651 m (5' 5\")   Wt 53 kg (116 lb 12.8 oz)   SpO2 93%   BMI 19.44 kg/m     ECOG PS-   Constitutional: Alert, thin, cooperative, and in no distress.   ENT: Eyes bright, No mouth sores. Voice is now mildly hoarse  Neck: Supple, No adenopathy.  Cardiac: Heart rate and rhythm is regular and strong without murmur  Respiratory: Breathing easy. Lung sounds clear to auscultation  GI: Abdomen is soft, non-tender, PEG site with no redness and minor serous discharge and no odor.  BS normal. No masses or organomegaly  MS: Muscle tone normal, extremities normal with no edema.   Skin: No suspicious lesions or rashes  Neuro: Sensory grossly WNL, gait normal.   Lymph: Normal ant/post cervical, axillary, supraclavicular nodes  Psych: Mentation appears normal and affect normal/bright with good conversation.     Laboratory Results:   Results for orders placed or performed in visit on 04/08/20   CBC with platelets differential     Status: Abnormal   Result Value Ref Range    WBC 9.0 4.0 - 11.0 10e9/L    RBC Count 4.40 4.4 - 5.9 10e12/L    Hemoglobin 11.4 (L) 13.3 - 17.7 g/dL    Hematocrit 36.5 (L) 40.0 - 53.0 %    MCV 83 78 - 100 fl    MCH 25.9 (L) 26.5 " - 33.0 pg    MCHC 31.2 (L) 31.5 - 36.5 g/dL    RDW 16.4 (H) 10.0 - 15.0 %    Platelet Count 458 (H) 150 - 450 10e9/L    Diff Method Automated Method     % Neutrophils 79.4 %    % Lymphocytes 9.7 %    % Monocytes 9.9 %    % Eosinophils 0.0 %    % Basophils 0.4 %    % Immature Granulocytes 0.6 %    Absolute Neutrophil 7.1 1.6 - 8.3 10e9/L    Absolute Lymphocytes 0.9 0.8 - 5.3 10e9/L    Absolute Monocytes 0.9 0.0 - 1.3 10e9/L    Absolute Eosinophils 0.0 0.0 - 0.7 10e9/L    Absolute Basophils 0.0 0.0 - 0.2 10e9/L    Abs Immature Granulocytes 0.1 0 - 0.4 10e9/L   Basic metabolic panel     Status: Abnormal   Result Value Ref Range    Sodium 140 133 - 144 mmol/L    Potassium 4.4 3.4 - 5.3 mmol/L    Chloride 106 94 - 109 mmol/L    Carbon Dioxide 29 20 - 32 mmol/L    Anion Gap 5 3 - 14 mmol/L    Glucose 114 (H) 70 - 99 mg/dL    Urea Nitrogen 19 7 - 30 mg/dL    Creatinine 0.80 0.66 - 1.25 mg/dL    GFR Estimate 86 >60 mL/min/[1.73_m2]    GFR Estimate If Black >90 >60 mL/min/[1.73_m2]    Calcium 9.7 8.5 - 10.1 mg/dL   Magnesium     Status: Abnormal   Result Value Ref Range    Magnesium 2.4 (H) 1.6 - 2.3 mg/dL   Protein qualitative urine     Status: None   Result Value Ref Range    Protein Albumin Urine Negative NEG^Negative mg/dL     Assessment and Plan:   Pleural Mesothelioma- pt began treatment on 3/18/2020 with pemetrexed/carboplatin/bevacizumab and noted some nausea and regurgitation but recuperated and felt fine rest of cycle with labs and exam finding he is doing well and meets goals to continue with treatment with cycle 2 today.   He will return in 3 weeks for cycle 3 with review prior to cycle to see if symtpoms resovle with further treatment.   Nausea- reviewed use of medications- asked to start days 2-3 with antiemetics and follow with second medication if necessary.   Regurgitation- will use prilosec solution daily x 1 month and review if necessary.   New PEG tube- no sign of infection. Cleaned and reassured.  Reviewed care with pt an signs of concern.   Pt has had large weight loss with start of the health concerns and has limited oral feeding due to narrowing. He is now being followed by home infusion dietition and is using 5 cartons daily of formula with rinses and small amount of oral fluids.   Constipation- may have been from few pain medications taken after PEG placement but is now aware of need for Miralax and can add more fluids per PEG if needed. Reminded of exercise being helpful for bowels as well.   This was a 30 min visit with > 50% in counseling and coordinating care including education and management of concerns.    Rajni Drummond,CNP

## 2020-04-08 NOTE — LETTER
4/8/2020         RE: Pradeep Figueroa Jr.  [address] [place] [address]        Dear Colleague,    Thank you for referring your patient, Pradeep Figueroa Jr., to the [facility]. Please see a copy of my visit note below.    Oncology Follow Up Visit: April 8, 2020    Oncologist: Dr Myles Breen  PCP: Feliz Majano    Diagnosis: Pleural Mesothelioma  Pradeep Figueroa Jr.is a 75 yo male with history of epilepsy who presented to clinic in 7/2019  With progressive dysphagia. Work up by GI difficult work up with initial finding of achalasia. CT chest/abd/pelvis 2/18/20 showed significant circumferential mid-distal esophageal thickening with upstream dilation which was atypical for type II achalasia.  He was found to have an incidental moderate sized left sided pleural effusion   Along with some thickening in the parietal pleural lining and the right lower lobe and also thickening of the pleural lining in the mediastinum around the esophagus.Further endoscopy and EUS showed esophageal stricture with thickening 30 cm from incisors within mediastinum but outside esophageal wall. Biopsies of the specimen showed Loss of BAP1 protein expression that is  consistent   with malignant mesothelioma, epithelioid type based on this limited biopsy.    Treatment:   3/18/2020 began pemetrexed/carboplatin/bevacizumab    Interval History: Mr Figueroa comes to clinic prior to cycle 2 of pemetrexed/carboplatin/bevacizumab to treat his mesothelioma. Pt state he is feeling well - better since placement of PEG tube and start of Feedings per Gtube. He is also adding fluid and admits he is taking very little by mouth at this time due to throat narrowing.He reports having 3 days of nausea post first chemotherapy and wonders if some regurgitation with stomach pain.  He has also needed to use miralax for some constipation that may have been brought on by pain meds used after PEG Placement. He denies pain today, nausea,  "SOB fevers, weakness, or neuropathy.    Rest of comprehensive and complete ROS is reviewed and is negative.   Past Medical History:   Diagnosis Date     Mesothelioma (H)      Seizures (H)      Current Outpatient Medications   Medication     acetaminophen (TYLENOL) 325 MG tablet     albuterol (PROAIR HFA/PROVENTIL HFA/VENTOLIN HFA) 108 (90 Base) MCG/ACT inhaler     alendronate (FOSAMAX) 70 MG tablet     cholecalciferol 25 MCG (1000 UT) TABS     dexamethasone (DECADRON) 4 MG tablet     folic acid (FOLVITE) 1 MG tablet     guaiFENesin-codeine (ROBITUSSIN AC) 100-10 MG/5ML solution     lamoTRIgine (LAMICTAL) 200 MG tablet     LORazepam (ATIVAN) 0.5 MG tablet     Nutritional Supplements (ISOSOURCE 1.5 DIMA) LIQD     oxyCODONE (ROXICODONE) 5 MG tablet     prochlorperazine (COMPAZINE) 10 MG tablet     No current facility-administered medications for this visit.      No Known Allergies    Physical Exam:/86 (BP Location: Right arm, Patient Position: Chair, Cuff Size: Adult Regular)   Pulse 74   Temp 98  F (36.7  C)   Ht 1.651 m (5' 5\")   Wt 53 kg (116 lb 12.8 oz)   SpO2 93%   BMI 19.44 kg/m     ECOG PS-   Constitutional: Alert, thin, cooperative, and in no distress.   ENT: Eyes bright, No mouth sores. Voice is now mildly hoarse  Neck: Supple, No adenopathy.  Cardiac: Heart rate and rhythm is regular and strong without murmur  Respiratory: Breathing easy. Lung sounds clear to auscultation  GI: Abdomen is soft, non-tender, PEG site with no redness and minor serous discharge and no odor.  BS normal. No masses or organomegaly  MS: Muscle tone normal, extremities normal with no edema.   Skin: No suspicious lesions or rashes  Neuro: Sensory grossly WNL, gait normal.   Lymph: Normal ant/post cervical, axillary, supraclavicular nodes  Psych: Mentation appears normal and affect normal/bright with good conversation.     Laboratory Results:   Results for orders placed or performed in visit on 04/08/20   CBC with platelets " differential     Status: Abnormal   Result Value Ref Range    WBC 9.0 4.0 - 11.0 10e9/L    RBC Count 4.40 4.4 - 5.9 10e12/L    Hemoglobin 11.4 (L) 13.3 - 17.7 g/dL    Hematocrit 36.5 (L) 40.0 - 53.0 %    MCV 83 78 - 100 fl    MCH 25.9 (L) 26.5 - 33.0 pg    MCHC 31.2 (L) 31.5 - 36.5 g/dL    RDW 16.4 (H) 10.0 - 15.0 %    Platelet Count 458 (H) 150 - 450 10e9/L    Diff Method Automated Method     % Neutrophils 79.4 %    % Lymphocytes 9.7 %    % Monocytes 9.9 %    % Eosinophils 0.0 %    % Basophils 0.4 %    % Immature Granulocytes 0.6 %    Absolute Neutrophil 7.1 1.6 - 8.3 10e9/L    Absolute Lymphocytes 0.9 0.8 - 5.3 10e9/L    Absolute Monocytes 0.9 0.0 - 1.3 10e9/L    Absolute Eosinophils 0.0 0.0 - 0.7 10e9/L    Absolute Basophils 0.0 0.0 - 0.2 10e9/L    Abs Immature Granulocytes 0.1 0 - 0.4 10e9/L   Basic metabolic panel     Status: Abnormal   Result Value Ref Range    Sodium 140 133 - 144 mmol/L    Potassium 4.4 3.4 - 5.3 mmol/L    Chloride 106 94 - 109 mmol/L    Carbon Dioxide 29 20 - 32 mmol/L    Anion Gap 5 3 - 14 mmol/L    Glucose 114 (H) 70 - 99 mg/dL    Urea Nitrogen 19 7 - 30 mg/dL    Creatinine 0.80 0.66 - 1.25 mg/dL    GFR Estimate 86 >60 mL/min/[1.73_m2]    GFR Estimate If Black >90 >60 mL/min/[1.73_m2]    Calcium 9.7 8.5 - 10.1 mg/dL   Magnesium     Status: Abnormal   Result Value Ref Range    Magnesium 2.4 (H) 1.6 - 2.3 mg/dL   Protein qualitative urine     Status: None   Result Value Ref Range    Protein Albumin Urine Negative NEG^Negative mg/dL     Assessment and Plan:   Pleural Mesothelioma- pt began treatment on 3/18/2020 with pemetrexed/carboplatin/bevacizumab and noted some nausea and regurgitation but recuperated and felt fine rest of cycle with labs and exam finding he is doing well and meets goals to continue with treatment with cycle 2 today.   He will return in 3 weeks for cycle 3 with review prior to cycle to see if symtpoms resovle with further treatment.   Nausea- reviewed use of  medications- asked to start days 2-3 with antiemetics and follow with second medication if necessary.   Regurgitation- will use prilosec solution daily x 1 month and review if necessary.   New PEG tube- no sign of infection. Cleaned and reassured. Reviewed care with pt an signs of concern.   Pt has had large weight loss with start of the health concerns and has limited oral feeding due to narrowing. He is now being followed by home infusion dietition and is using 5 cartons daily of formula with rinses and small amount of oral fluids.   Constipation- may have been from few pain medications taken after PEG placement but is now aware of need for Miralax and can add more fluids per PEG if needed. Reminded of exercise being helpful for bowels as well.   This was a 30 min visit with > 50% in counseling and coordinating care including education and management of concerns.    Rajni Drummond CNP      Again, thank you for allowing me to participate in the care of your patient.        Sincerely,        Rajni Drummond, ZAIDA, APRN CNP

## 2020-04-08 NOTE — PROGRESS NOTES
"Patient's name and  were verified.  See Doc Flowsheet - IV assess for details.  IVAD accessed with 20G 1\" lobato gripper plus needle  blood return positive: YES  Site without redness, tenderness or swelling: YES  flushed with 10cc NS and 5cc 100u/ml heparin  Needle: left in place for treatment  Comments: Labs drawn- per orders.  Patient tolerated procedure without incident.      "

## 2020-04-08 NOTE — PROGRESS NOTES
Infusion Nursing Note:  Pradeep Figueroa  presents today for C2D1 Pemetrexed/Carbo/Bevacizumab.    Patient seen by provider today: Yes: Rajni    present during visit today: Not Applicable.    Note: Pt c/o some nausea and constipation related to tube feedings, relieved with antiemetics and miralax prn.  See flow sheet for assessment.    Intravenous Access:  Implanted Port.    Treatment Conditions:  Lab Results   Component Value Date    HGB 11.4 04/08/2020     Lab Results   Component Value Date    WBC 9.0 04/08/2020      Lab Results   Component Value Date    ANEU 7.1 04/08/2020     Lab Results   Component Value Date     04/08/2020      Lab Results   Component Value Date     04/08/2020                   Lab Results   Component Value Date    POTASSIUM 4.4 04/08/2020           Lab Results   Component Value Date    MAG 2.4 04/08/2020            Lab Results   Component Value Date    CR 0.80 04/08/2020                   Lab Results   Component Value Date    DIMA 9.7 04/08/2020                No results found for: BILITOTAL        No results found for: ALBUMIN                 No results found for: ALT        No results found for: AST  Results reviewed, labs MET treatment parameters, ok to proceed with treatment.  Urine negative.      Post Infusion Assessment:  Patient tolerated infusion without incident.  Blood return noted pre and post infusion.  Site patent and intact, free from redness, edema or discomfort.  No evidence of extravasations.  Access discontinued per protocol.       Discharge Plan:   Patient discharged in stable condition accompanied by: self.  Departure Mode: Ambulatory.  Pt will RTC after his next visit with Dr. Breen, pt wants to schedule next appt after this appt.    Sean Lindsay RN

## 2020-04-17 ENCOUNTER — PATIENT OUTREACH (OUTPATIENT)
Dept: ONCOLOGY | Facility: CLINIC | Age: 75
End: 2020-04-17

## 2020-04-17 ENCOUNTER — TELEPHONE (OUTPATIENT)
Dept: ONCOLOGY | Facility: CLINIC | Age: 75
End: 2020-04-17

## 2020-04-17 DIAGNOSIS — C45.0 MESOTHELIOMA (PLEURAL) (H): ICD-10-CM

## 2020-04-17 RX ORDER — OXYCODONE HYDROCHLORIDE 5 MG/1
5-10 TABLET ORAL EVERY 4 HOURS PRN
Qty: 30 TABLET | Refills: 0 | Status: SHIPPED | OUTPATIENT
Start: 2020-04-17 | End: 2020-04-17

## 2020-04-17 RX ORDER — OXYCODONE HYDROCHLORIDE 5 MG/1
5-10 TABLET ORAL EVERY 4 HOURS PRN
Qty: 30 TABLET | Refills: 0 | Status: SHIPPED | OUTPATIENT
Start: 2020-04-17 | End: 2020-04-21

## 2020-04-17 NOTE — TELEPHONE ENCOUNTER
Caller, Pt's SO Nataliia, called stating that this AM she had called and spoken with Henry Paez about Pt's oxy Rx and some other issues. Stated that she was awaiting a call back regarding the Rx and plans for imaging or visits and such. Chart review showed no notes from today. Routing encounter to Care Team, Messaged Henry Paez.

## 2020-04-18 NOTE — PROGRESS NOTES
Received a call from Flavia BAIRD from [facility] stating that Nataliia-wife has some concerns about Yossi.  He moved a couple of boxed last night and noticed pain at the G-tube site.  She states that he noticed a little bit of blood last night and it stopped.  He is not noticing any other bleeding, or draining from the site.  The tube is flushing normally as he flushed it immediately afterwards.  He states that the pain is significant enough that he started taking the oxycodone and will need a refill of that.  He took 2 tablets this morning and is needing it every 4 hours.  She states that she has some tylenol and if its ok to give that.  Advised her that she can alternate the tylenol with the oxy and he should not be taking more than 3000mg of tylenol in a 24 hour period.  She is questioning whether he should have a scan done to see if there is any damage to the tube or to any other area.  She did state that he has a scan scheduled next week prior to seeing .  Let her know  will be contacted and Nataliia will be called back with recommendations.  She agreed with and verbalized understanding of the plan of care.    Update:  1:32pm  Per  he recommends that Yossi can see Bita VUONG PA-C next week.  He also stated that it is ok to move the scan up by one week.  He agreed to refilling the oxycodone prescription.  He doesn't think that this pain has anything to do with the mesothelioma, but without the scan it is hard to tell.   Called and [facility] with the above information to Yossi and Nataliia.  Asked for a callback with any questions.  A request was sent to scheduling to change the labs and the scans to early next week.    Update: 4:42pm  Nataliia called back stating that she called [facility] pharmacy and they still don't have the prescription for oxycodone.  Let her know that I would text  and get back to her as soon as I heard back from him.      Update 7:30pm  Called [facility] to confirm that they  had received the the prescription as it was written at 4:54pm.  They stated that they did not.  Notified  who stated that he doesn't appear to have narcotic phone privileges yet.  Called the on-call MD for  and am waiting for a callback from Dr.Marie Lira a fellow in oncology.  Contacted Nataliia to let her know of the situation. Will call her back with updates.    Update 8:48pm  Received a call back from the fellow Sheila Lira MD and she stated that the fellows are not allowed to send opiod prescriptions electronically.  She stated that  is the attending on call and he should be notified.   has been texted and awaiting a response.   texted back and will send the prescription to [facility] in [place].  Called the pharmacy and Lizandro the pharmacist confirmed they have received the prescription.  Called Nataliia and [facility] with the information that she can  the prescription at her convenience.

## 2020-04-20 ENCOUNTER — PATIENT OUTREACH (OUTPATIENT)
Dept: ONCOLOGY | Facility: CLINIC | Age: 75
End: 2020-04-20

## 2020-04-21 ENCOUNTER — PATIENT OUTREACH (OUTPATIENT)
Dept: ONCOLOGY | Facility: CLINIC | Age: 75
End: 2020-04-21

## 2020-04-21 ENCOUNTER — ANCILLARY PROCEDURE (OUTPATIENT)
Dept: CT IMAGING | Facility: CLINIC | Age: 75
End: 2020-04-21
Attending: INTERNAL MEDICINE
Payer: MEDICARE

## 2020-04-21 ENCOUNTER — TELEPHONE (OUTPATIENT)
Dept: ONCOLOGY | Facility: CLINIC | Age: 75
End: 2020-04-21

## 2020-04-21 DIAGNOSIS — C45.0 MESOTHELIOMA (PLEURAL) (H): ICD-10-CM

## 2020-04-21 DIAGNOSIS — C45.0 MESOTHELIOMA (PLEURAL) (H): Primary | ICD-10-CM

## 2020-04-21 LAB
ALBUMIN SERPL-MCNC: 2.7 G/DL (ref 3.4–5)
ALP SERPL-CCNC: 68 U/L (ref 40–150)
ALT SERPL W P-5'-P-CCNC: 31 U/L (ref 0–70)
ANION GAP SERPL CALCULATED.3IONS-SCNC: 3 MMOL/L (ref 3–14)
AST SERPL W P-5'-P-CCNC: 25 U/L (ref 0–45)
BILIRUB SERPL-MCNC: 0.1 MG/DL (ref 0.2–1.3)
BUN SERPL-MCNC: 23 MG/DL (ref 7–30)
CALCIUM SERPL-MCNC: 8.8 MG/DL (ref 8.5–10.1)
CHLORIDE SERPL-SCNC: 104 MMOL/L (ref 94–109)
CO2 SERPL-SCNC: 30 MMOL/L (ref 20–32)
CREAT SERPL-MCNC: 0.63 MG/DL (ref 0.66–1.25)
GFR SERPL CREATININE-BSD FRML MDRD: >90 ML/MIN/{1.73_M2}
GLUCOSE SERPL-MCNC: 122 MG/DL (ref 70–99)
POTASSIUM SERPL-SCNC: 4.5 MMOL/L (ref 3.4–5.3)
PROT SERPL-MCNC: 6.8 G/DL (ref 6.8–8.8)
SODIUM SERPL-SCNC: 137 MMOL/L (ref 133–144)

## 2020-04-21 PROCEDURE — 71260 CT THORAX DX C+: CPT | Performed by: RADIOLOGY

## 2020-04-21 PROCEDURE — 80053 COMPREHEN METABOLIC PANEL: CPT | Performed by: RADIOLOGY

## 2020-04-21 PROCEDURE — 36415 COLL VENOUS BLD VENIPUNCTURE: CPT | Performed by: RADIOLOGY

## 2020-04-21 PROCEDURE — 74177 CT ABD & PELVIS W/CONTRAST: CPT | Performed by: RADIOLOGY

## 2020-04-21 PROCEDURE — 85025 COMPLETE CBC W/AUTO DIFF WBC: CPT | Performed by: RADIOLOGY

## 2020-04-21 RX ORDER — IOPAMIDOL 755 MG/ML
72 INJECTION, SOLUTION INTRAVASCULAR ONCE
Status: COMPLETED | OUTPATIENT
Start: 2020-04-21 | End: 2020-04-21

## 2020-04-21 RX ORDER — HEPARIN SODIUM (PORCINE) LOCK FLUSH IV SOLN 100 UNIT/ML 100 UNIT/ML
5 SOLUTION INTRAVENOUS
Status: SHIPPED | OUTPATIENT
Start: 2020-04-21

## 2020-04-21 RX ORDER — OXYCODONE HYDROCHLORIDE 5 MG/1
5-10 TABLET ORAL EVERY 4 HOURS PRN
Qty: 30 TABLET | Refills: 0 | Status: SHIPPED | OUTPATIENT
Start: 2020-04-21 | End: 2020-10-29

## 2020-04-21 RX ADMIN — IOPAMIDOL 72 ML: 755 INJECTION, SOLUTION INTRAVASCULAR at 13:18

## 2020-04-21 NOTE — TELEPHONE ENCOUNTER
Benito Urban,     Patient has a scan today due to a lot of pain below the port. Patient's spouse said you gave him liquid prilosec. His wife accidentally spilled it. Can she get about 2 weeks worth?? Nataliia would like you to call her.     Thanks, Valerie Nguyen renewed with note to pharmacist.   LPN to call and share renewal. Rajni Drummond,CNp

## 2020-04-21 NOTE — PROGRESS NOTES
Spoke with Nataliia who stated that Yossi has his scan scheduled for today and he would like to  a prescription for oxycodone.  She states that she has left a message for Rajni Drummond but hasn't heard back.  Let her know that I would get in contact with her and call her back.  She also stated that she needs a prescription for liquid prilosec as she spilled about half of it on the counter.  She was also asking about when the results of the scan would be back.  Let her know that I will give her a call, and  will discuss the plans.  If something more urgent comes up, they will be notified earlier.      Spoke with Rajni YEAGER and she will send a prescription to his local pharmacy in [place].  Called Nataliia back to notify her of the plan.  Also let her know that a palliative care referral will be placed so Yossi can get better pain control.  She agree with and verbalized understanding of the plan of care.

## 2020-04-21 NOTE — TELEPHONE ENCOUNTER
Writer spoke with Jennifer (patient's wife) informed RF sent to [facility] in [place]. Ariadna Wallace LPN on 4/21/2020 at 11:02 AM

## 2020-04-22 LAB
ANISOCYTOSIS BLD QL SMEAR: SLIGHT
BASOPHILS # BLD AUTO: 0 10E9/L (ref 0–0.2)
BASOPHILS NFR BLD AUTO: 1 %
DACRYOCYTES BLD QL SMEAR: SLIGHT
DIFFERENTIAL METHOD BLD: ABNORMAL
ELLIPTOCYTES BLD QL SMEAR: SLIGHT
EOSINOPHIL # BLD AUTO: 0 10E9/L (ref 0–0.7)
EOSINOPHIL NFR BLD AUTO: 1 %
ERYTHROCYTE [DISTWIDTH] IN BLOOD BY AUTOMATED COUNT: 16.7 % (ref 10–15)
HCT VFR BLD AUTO: 30.5 % (ref 40–53)
HGB BLD-MCNC: 9.7 G/DL (ref 13.3–17.7)
LYMPHOCYTES # BLD AUTO: 1 10E9/L (ref 0.8–5.3)
LYMPHOCYTES NFR BLD AUTO: 38 %
MCH RBC QN AUTO: 26.4 PG (ref 26.5–33)
MCHC RBC AUTO-ENTMCNC: 31.8 G/DL (ref 31.5–36.5)
MCV RBC AUTO: 83 FL (ref 78–100)
MONOCYTES # BLD AUTO: 0.4 10E9/L (ref 0–1.3)
MONOCYTES NFR BLD AUTO: 15 %
NEUTROPHILS # BLD AUTO: 1.2 10E9/L (ref 1.6–8.3)
NEUTROPHILS NFR BLD AUTO: 45 %
PLATELET # BLD AUTO: 89 10E9/L (ref 150–450)
PLATELET # BLD EST: ABNORMAL 10*3/UL
RBC # BLD AUTO: 3.67 10E12/L (ref 4.4–5.9)
WBC # BLD AUTO: 2.6 10E9/L (ref 4–11)

## 2020-04-23 ENCOUNTER — PATIENT OUTREACH (OUTPATIENT)
Dept: ONCOLOGY | Facility: CLINIC | Age: 75
End: 2020-04-23

## 2020-04-23 NOTE — PROGRESS NOTES
Received a call from Nataliia asking if the results of the CT scan Yossi had on Tuesday are available.  Went over the results with her. Let her know that the scans were stable and didn't show anything new or significant that would warrant the pain. She states that he is a little bit better.  She also states of a little more importance that the G-tube site is now starting to drain thick pus like material.  Yossi isn't having any fevers, redness, or pain at the site.  The tube is functioning properly.  Let her know that  will be notified for recommendations.  She has also put out a call to Rajni YEAGER in .  She agreed with and verbalized understanding of the plan of care.

## 2020-04-24 ENCOUNTER — ONCOLOGY VISIT (OUTPATIENT)
Dept: ONCOLOGY | Facility: CLINIC | Age: 75
End: 2020-04-24
Payer: MEDICARE

## 2020-04-24 VITALS
BODY MASS INDEX: 20.2 KG/M2 | SYSTOLIC BLOOD PRESSURE: 138 MMHG | DIASTOLIC BLOOD PRESSURE: 78 MMHG | TEMPERATURE: 98.1 F | OXYGEN SATURATION: 96 % | HEART RATE: 93 BPM | RESPIRATION RATE: 18 BRPM | WEIGHT: 121.4 LBS

## 2020-04-24 DIAGNOSIS — C45.0 MESOTHELIOMA (PLEURAL) (H): ICD-10-CM

## 2020-04-24 DIAGNOSIS — K94.22 SKIN INFECTION AT GASTROSTOMY TUBE SITE (H): Primary | ICD-10-CM

## 2020-04-24 DIAGNOSIS — K59.03 DRUG-INDUCED CONSTIPATION: ICD-10-CM

## 2020-04-24 DIAGNOSIS — K21.9 GASTROESOPHAGEAL REFLUX DISEASE, ESOPHAGITIS PRESENCE NOT SPECIFIED: ICD-10-CM

## 2020-04-24 DIAGNOSIS — L08.9 SKIN INFECTION AT GASTROSTOMY TUBE SITE (H): Primary | ICD-10-CM

## 2020-04-24 PROCEDURE — 99214 OFFICE O/P EST MOD 30 MIN: CPT | Performed by: NURSE PRACTITIONER

## 2020-04-24 RX ORDER — CEPHALEXIN 500 MG/1
500 CAPSULE ORAL 3 TIMES DAILY
Qty: 30 CAPSULE | Refills: 0 | Status: SHIPPED | OUTPATIENT
Start: 2020-04-24 | End: 2020-05-06

## 2020-04-24 ASSESSMENT — PAIN SCALES - GENERAL: PAINLEVEL: MILD PAIN (2)

## 2020-04-24 NOTE — NURSING NOTE
"Oncology Rooming Note    April 24, 2020 1:40 PM   Pradeep Figueroa Jr. is a 74 year old male who presents for:    Chief Complaint   Patient presents with     Oncology Clinic Visit     Check feeding tube     Initial Vitals: /78 (BP Location: Right arm)   Pulse 93   Temp 98.1  F (36.7  C) (Oral)   Resp 18   Wt 55.1 kg (121 lb 6.4 oz)   SpO2 96%   BMI 20.20 kg/m   Estimated body mass index is 20.2 kg/m  as calculated from the following:    Height as of 4/8/20: 1.651 m (5' 5\").    Weight as of this encounter: 55.1 kg (121 lb 6.4 oz). Body surface area is 1.59 meters squared.  Mild Pain (2) Comment: Data Unavailable   No LMP for male patient.  Allergies reviewed: Yes  Medications reviewed: Yes    Medications: Medication refills not needed today.  Pharmacy name entered into [facility]: [id number] - [facility] [address]    Clinical concerns: Noticed feeding tube drainage which was pus in color. Site is also a little tender.       Ariadna Wallace LPN              "

## 2020-04-24 NOTE — LETTER
4/24/2020         RE: Pradeep Figueroa Jr.  [address] [place] [address]        Dear Colleague,    Thank you for referring your patient, Pradeep Figueroa Jr., to the [facility]. Please see a copy of my visit note below.    Oncology Follow Up Visit: April 24, 2020    Oncologist: Dr Myles Breen  PCP: Feliz Majano    Diagnosis: Pleural Mesothelioma- here for Gtube discharge and check for infection  Pradeep Figueroa Jr.is a 75 yo male with history of epilepsy who presented to clinic in 7/2019  With progressive dysphagia. Work up by GI difficult work up with initial finding of achalasia. CT chest/abd/pelvis 2/18/20 showed significant circumferential mid-distal esophageal thickening with upstream dilation which was atypical for type II achalasia.  He was found to have an incidental moderate sized left sided pleural effusion   Along with some thickening in the parietal pleural lining and the right lower lobe and also thickening of the pleural lining in the mediastinum around the esophagus.Further endoscopy and EUS showed esophageal stricture with thickening 30 cm from incisors within mediastinum but outside esophageal wall. Biopsies of the specimen showed Loss of BAP1 protein expression that is  consistent   with malignant mesothelioma, epithelioid type based on this limited biopsy.    Treatment:   3/18/2020 began pemetrexed/carboplatin/bevacizumab    Interval History: Mr Figueroa comes to clinic alone after calling in to care coordinator stating he has been having increased discharge around Gtube site x 3-4 days. He has had mild tenderness to the area that has not progressed but the discharge is yellow and sticky and creamy that does not appear to be related to use of the tube. He has not had fevers and the feedings have been going well with mild weight gain. He has been pushing fluids. Bowels are constipated but miralax has been working well now. He reports he pulled a muscle in the  abdomen a couple weeks ago but is now improved. Regurgitation much better with prilosec- stopped the Tagamet.   Rest of comprehensive and complete ROS is reviewed and is negative.   Past Medical History:   Diagnosis Date     Mesothelioma (H)      Seizures (H)      Current Outpatient Medications   Medication     acetaminophen (TYLENOL) 325 MG tablet     albuterol (PROAIR HFA/PROVENTIL HFA/VENTOLIN HFA) 108 (90 Base) MCG/ACT inhaler     alendronate (FOSAMAX) 70 MG tablet     cephALEXin (KEFLEX) 500 MG capsule     cholecalciferol 25 MCG (1000 UT) TABS     dexamethasone (DECADRON) 4 MG tablet     folic acid (FOLVITE) 1 MG tablet     guaiFENesin-codeine (ROBITUSSIN AC) 100-10 MG/5ML solution     lamoTRIgine (LAMICTAL) 200 MG tablet     Nutritional Supplements (ISOSOURCE 1.5 DIMA) LIQD     omeprazole (PRILOSEC) 2 mg/mL suspension     oxyCODONE (ROXICODONE) 5 MG tablet     cimetidine (TAGAMET HB) 200 MG tablet     LORazepam (ATIVAN) 0.5 MG tablet     prochlorperazine (COMPAZINE) 10 MG tablet     No current facility-administered medications for this visit.      Facility-Administered Medications Ordered in Other Visits   Medication     heparin 100 UNIT/ML injection 5 mL     No Known Allergies    Physical Exam:/78 (BP Location: Right arm)   Pulse 93   Temp 98.1  F (36.7  C) (Oral)   Resp 18   Wt 55.1 kg (121 lb 6.4 oz)   SpO2 96%   BMI 20.20 kg/m   up 1.4 lbs   ECOG PS- 0-1  Constitutional: Alert, thin, cooperative, and in no distress.   ENT: Eyes bright, No mouth sores.no cold symptoms  Respiratory: Breathing easy. No cough  GI: Abdomen is soft, non-tender, PEG site with mild redness with yellow creamy discharge- mild odor noted to discharge - not tender as cleaning and replacing dressing.  BS normal. No masses or organomegaly  MS: Muscle tone normal, extremities normal with no edema.   Psych: Mentation appears normal and affect normal/bright with good conversation.     Laboratory Results:   No results found for  any visits on 04/24/20.  Assessment and Plan:   New PEG tube infection- just placed 3/31- used for feedings with mild weight gain. Will add keflex 500mg tid x 7-10 days. Area to be cleaned 1-2 x daily and report if not improving over the week or with new fevers.   Pleural Mesothelioma- pt began treatment on 3/18/2020 with pemetrexed/carboplatin/bevacizumab and noted some nausea and regurgitation but recuperated and felt fine rest of cycle with labs and exam finding he is doing well and meets goals to continue with treatment with cycle 2 today.   He will return  for cycle 3 with review prior to cycle to see if symtpoms resovle with further treatment.   Regurgitation-  using prilosec solution daily x 1 month with improvement noted  Constipation- may have been from few pain medications taken after PEG placement - Miralax and more fluids per PEG is helpful  This was a 20 min visit with > 50% in counseling and coordinating care including education and management of concerns.    Rajni Drummond,CNP          Again, thank you for allowing me to participate in the care of your patient.        Sincerely,        Rajni Drummond, NP, APRN CNP

## 2020-04-24 NOTE — PROGRESS NOTES
Oncology Follow Up Visit: April 24, 2020    Oncologist: Dr Myles Breen  PCP: Feliz Majano    Diagnosis: Pleural Mesothelioma- here for Gtube discharge and check for infection  Pradeep Figueroa Jr.is a 75 yo male with history of epilepsy who presented to clinic in 7/2019  With progressive dysphagia. Work up by GI difficult work up with initial finding of achalasia. CT chest/abd/pelvis 2/18/20 showed significant circumferential mid-distal esophageal thickening with upstream dilation which was atypical for type II achalasia.  He was found to have an incidental moderate sized left sided pleural effusion   Along with some thickening in the parietal pleural lining and the right lower lobe and also thickening of the pleural lining in the mediastinum around the esophagus.Further endoscopy and EUS showed esophageal stricture with thickening 30 cm from incisors within mediastinum but outside esophageal wall. Biopsies of the specimen showed Loss of BAP1 protein expression that is  consistent   with malignant mesothelioma, epithelioid type based on this limited biopsy.    Treatment:   3/18/2020 began pemetrexed/carboplatin/bevacizumab    Interval History: Mr Figueroa comes to clinic alone after calling in to care coordinator stating he has been having increased discharge around Gtube site x 3-4 days. He has had mild tenderness to the area that has not progressed but the discharge is yellow and sticky and creamy that does not appear to be related to use of the tube. He has not had fevers and the feedings have been going well with mild weight gain. He has been pushing fluids. Bowels are constipated but miralax has been working well now. He reports he pulled a muscle in the abdomen a couple weeks ago but is now improved. Regurgitation much better with prilosec- stopped the Tagamet.   Rest of comprehensive and complete ROS is reviewed and is negative.   Past Medical History:   Diagnosis Date     Mesothelioma (H)      Seizures  (H)      Current Outpatient Medications   Medication     acetaminophen (TYLENOL) 325 MG tablet     albuterol (PROAIR HFA/PROVENTIL HFA/VENTOLIN HFA) 108 (90 Base) MCG/ACT inhaler     alendronate (FOSAMAX) 70 MG tablet     cephALEXin (KEFLEX) 500 MG capsule     cholecalciferol 25 MCG (1000 UT) TABS     dexamethasone (DECADRON) 4 MG tablet     folic acid (FOLVITE) 1 MG tablet     guaiFENesin-codeine (ROBITUSSIN AC) 100-10 MG/5ML solution     lamoTRIgine (LAMICTAL) 200 MG tablet     Nutritional Supplements (ISOSOURCE 1.5 DIMA) LIQD     omeprazole (PRILOSEC) 2 mg/mL suspension     oxyCODONE (ROXICODONE) 5 MG tablet     cimetidine (TAGAMET HB) 200 MG tablet     LORazepam (ATIVAN) 0.5 MG tablet     prochlorperazine (COMPAZINE) 10 MG tablet     No current facility-administered medications for this visit.      Facility-Administered Medications Ordered in Other Visits   Medication     heparin 100 UNIT/ML injection 5 mL     No Known Allergies    Physical Exam:/78 (BP Location: Right arm)   Pulse 93   Temp 98.1  F (36.7  C) (Oral)   Resp 18   Wt 55.1 kg (121 lb 6.4 oz)   SpO2 96%   BMI 20.20 kg/m   up 1.4 lbs   ECOG PS- 0-1  Constitutional: Alert, thin, cooperative, and in no distress.   ENT: Eyes bright, No mouth sores.no cold symptoms  Respiratory: Breathing easy. No cough  GI: Abdomen is soft, non-tender, PEG site with mild redness with yellow creamy discharge- mild odor noted to discharge - not tender as cleaning and replacing dressing.  BS normal. No masses or organomegaly  MS: Muscle tone normal, extremities normal with no edema.   Psych: Mentation appears normal and affect normal/bright with good conversation.     Laboratory Results:   No results found for any visits on 04/24/20.  Assessment and Plan:   New PEG tube infection- just placed 3/31- used for feedings with mild weight gain. Will add keflex 500mg tid x 7-10 days. Area to be cleaned 1-2 x daily and report if not improving over the week or with new  fevers.   Pleural Mesothelioma- pt began treatment on 3/18/2020 with pemetrexed/carboplatin/bevacizumab and noted some nausea and regurgitation but recuperated and felt fine rest of cycle with labs and exam finding he is doing well and meets goals to continue with treatment with cycle 2 today.   He will return  for cycle 3 with review prior to cycle to see if symtpoms resovle with further treatment.   Regurgitation-  using prilosec solution daily x 1 month with improvement noted  Constipation- may have been from few pain medications taken after PEG placement - Miralax and more fluids per PEG is helpful  This was a 20 min visit with > 50% in counseling and coordinating care including education and management of concerns.    Rajni Drummond,CNP

## 2020-04-29 ENCOUNTER — VIRTUAL VISIT (OUTPATIENT)
Dept: ONCOLOGY | Facility: CLINIC | Age: 75
End: 2020-04-29
Attending: INTERNAL MEDICINE
Payer: MEDICARE

## 2020-04-29 DIAGNOSIS — C45.0 MESOTHELIOMA (PLEURAL) (H): Primary | ICD-10-CM

## 2020-04-29 PROCEDURE — 99214 OFFICE O/P EST MOD 30 MIN: CPT | Mod: 95 | Performed by: INTERNAL MEDICINE

## 2020-04-29 PROCEDURE — 40000114 ZZH STATISTIC NO CHARGE CLINIC VISIT

## 2020-04-29 NOTE — PROGRESS NOTES
"Pradeep Figueroa Jr. is a 74 year old male who is being evaluated via a billable video visit.      The patient has been notified of following:     \"This video visit will be conducted via a call between you and your physician/provider. We have found that certain health care needs can be provided without the need for an in-person physical exam.  This service lets us provide the care you need with a video conversation.  If a prescription is necessary we can send it directly to your pharmacy.  If lab work is needed we can place an order for that and you can then stop by our lab to have the test done at a later time.    Video visits are billed at different rates depending on your insurance coverage.  Please reach out to your insurance provider with any questions.    If during the course of the call the physician/provider feels a video visit is not appropriate, you will not be charged for this service.\"    Patient has given verbal consent for Video visit? Yes    How would you like to obtain your AVS? Jenaro    Patient would like the video invitation sent by: Text to cell phone: [phone]    Will anyone else be joining your video visit? Yes: Edna. How would they like to receive their invitation? Text to cell phone: [phone], Nataliia - text to cell phone: [phone].    I have reviewed and updated the patient's allergies and medication list.    Concerns: No new concerns.   Refills: None needed.      Pricilla Jimenez CMA      Video-Visit Details    Type of service:  Video Visit    Video Start Time: 9:01 AM  Video End Time: 9:23 AM    Originating Location (pt. Location): Home    Distant Location (provider location):  [facility]     Mode of Communication:  Video Conference via [employer]      Myles Breen MD            Oncology Follow Up Visit: April 8, 2020    Oncologist: Dr Myles Breen  PCP: Feliz Majano    Diagnosis: Pleural Mesothelioma  Pradeep Figueroa Jr.is a 73 yo male with " history of epilepsy who presented to clinic in 7/2019  With progressive dysphagia. Work up by GI difficult work up with initial finding of achalasia. CT chest/abd/pelvis 2/18/20 showed significant circumferential mid-distal esophageal thickening with upstream dilation which was atypical for type II achalasia.  He was found to have an incidental moderate sized left sided pleural effusion   Along with some thickening in the parietal pleural lining and the right lower lobe and also thickening of the pleural lining in the mediastinum around the esophagus.Further endoscopy and EUS showed esophageal stricture with thickening 30 cm from incisors within mediastinum but outside esophageal wall. Biopsies of the specimen showed Loss of BAP1 protein expression that is  consistent   with malignant mesothelioma, epithelioid type based on this limited biopsy.    Treatment:   3/18/2020 began pemetrexed/carboplatin/bevacizumab  Underwent PEG tube palcement 3/30/20 for severe nausea, vomiting, due to mesothelioma involvement of esophagus. CT scan after 2 cycles with overall stable disease    Interval History: Mr Figueroa was on vide call with wife prior to 3rd cycle of pemetrexed/carboplatin/bevacizumab to treat his mesothelioma.   Pt state he is feeling well - better since placement of PEG tube and start of Feedings per Gtube. He is on antibiotics for cellulitis around PEG tube site but is getting better now. He is using 5 -51/2 cartons per day on PEG tube. He is tolerating the Rx well, except for mild fatigue and nausea that is well controlled. Vomiting has resolved. He is little more fatigued and needs to rest more often but is able to do all the tings he wants to do. He is able to swallow only thin liquids for now. He also has reflux symptoms. He denies pain today, nausea, SOB fevers, weakness, or neuropathy.      Rest of comprehensive and complete ROS is reviewed and is negative.     Past Medical History:   Diagnosis Date      Mesothelioma (H)      Seizures (H)      Current Outpatient Medications   Medication     acetaminophen (TYLENOL) 325 MG tablet     albuterol (PROAIR HFA/PROVENTIL HFA/VENTOLIN HFA) 108 (90 Base) MCG/ACT inhaler     alendronate (FOSAMAX) 70 MG tablet     cephALEXin (KEFLEX) 500 MG capsule     cholecalciferol 25 MCG (1000 UT) TABS     cimetidine (TAGAMET HB) 200 MG tablet     dexamethasone (DECADRON) 4 MG tablet     folic acid (FOLVITE) 1 MG tablet     guaiFENesin-codeine (ROBITUSSIN AC) 100-10 MG/5ML solution     lamoTRIgine (LAMICTAL) 200 MG tablet     LORazepam (ATIVAN) 0.5 MG tablet     Nutritional Supplements (ISOSOURCE 1.5 DIMA) LIQD     omeprazole (PRILOSEC) 2 mg/mL suspension     oxyCODONE (ROXICODONE) 5 MG tablet     prochlorperazine (COMPAZINE) 10 MG tablet     No current facility-administered medications for this visit.      Facility-Administered Medications Ordered in Other Visits   Medication     heparin 100 UNIT/ML injection 5 mL     No Known Allergies    Laboratory Results:   No results found for any visits on 04/29/20.       Assessment and Plan:     Unresectable Pleural Mesothelioma with esophgeal involvement- pt began treatment on 3/18/2020 with pemetrexed/carboplatin/bevacizumab and noted some nausea and regurgitation but recuperated and felt fine rest of cycle. CT scan after 2 cycles with stable disease. We will continue with the pemetrexed/carboplatin/bevacizumab every 3 weeks for 2 more cycles and then repeat scan after 4 cycles. If no further improvement in disease, we will consdier switching to immunotherapy (nivo_ipi) vs Keytruda. We will also consider palliative XRT to esophagus.     Plan  -Arrange for infusion appt at [facility] in next 1-2 days for Carboplatin+pemetrxed+Bevacizumab, then another appt 3 weeks after that  -RTC in 3 weeks to see Rajni Drummond prior to next infusiona ppt  -In 6 weeks RTC to see Dr. Breen with prior CT  .   #Nausea, Regurgitation-   S/p PEG tube- on abx for  suspected cellulitis, now improving. Pt has had large weight loss with start of the health concerns and has limited oral feeding due to narrowing. He is now being followed by home infusion dietition and is using 5 cartons daily of formula with rinses and small amount of oral fluids. reviewed use of medications- asked to start days 2-3 with antiemetics and follow with second medication if necessary.   - will use prilosec solution daily x 1 month and review if necessary.     Constipation- may have been from few pain medications taken after PEG placement but is now aware of need for Miralax and can add more fluids per PEG if needed. Reminded of exercise being helpful for bowels as well.     Staffed with Dr. Breen.    Roosevelt Infante MD  Hematology Oncology fellow  [phone]    I was present and participated in this video call.  Patient stable and doing okay.  Clement continue with current therapy. For two more cycles.

## 2020-04-30 ENCOUNTER — TELEPHONE (OUTPATIENT)
Dept: NUTRITION | Facility: CLINIC | Age: 75
End: 2020-04-30

## 2020-04-30 ENCOUNTER — PATIENT OUTREACH (OUTPATIENT)
Dept: ONCOLOGY | Facility: CLINIC | Age: 75
End: 2020-04-30

## 2020-04-30 NOTE — PROGRESS NOTES
Spoke with Nataliia who stated that Yossi should be having another treatment either today or tomorrow.  Let her know that  doesn't have anything until Monday with labs at 9 am and infusion at 9:30 am.  She is also stating that Yossi has only gained one pound this month.  Let her know that she should contact Magaly [address] as she can make adjustments to his tube feedings.  Spoke with Nataliia about palliative care for Yossi, what it entails and why its important that he keep his appointment.  Let her know I attempted to contact Yossi yesterday to talk to him about it, but his voice mail was full.  All of her questions have been answered and she agreed with and verbalized understanding of the plan of care.

## 2020-04-30 NOTE — TELEPHONE ENCOUNTER
Nutrition Services:     Received a VM from Priya today inquiring about Yossi's nutrition needs.    Called Priya back to discuss Yossi's current EN regimen.     Priya tells me that he has not been able to increase his volume to 5 1/2 - 6 cartons/day as previously recommended by writer.  She tells me that he tried to increase his volume at his 4:30pm feeding from 1 carton to 1 1/2 cartons but had increase gas/bloating.   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 She thinks they need to prioritize his feedings better.      His weight has been stable since initiation of EN, however, has not increased.   Reported weight on home scale = 110 lbs  Wt Readings from Last 8 Encounters:   04/24/20 55.1 kg (121 lb 6.4 oz)   04/08/20 53 kg (116 lb 12.8 oz)   03/31/20 54.6 kg (120 lb 5.9 oz)   03/18/20 56.8 kg (125 lb 4.8 oz)   03/11/20 56.2 kg (123 lb 14.4 oz)   03/04/20 57.7 kg (127 lb 4.8 oz)   02/20/20 57.9 kg (127 lb 10.3 oz)   01/29/20 58.7 kg (129 lb 8 oz)       Current EN regimen:  7am: 2 cartons  12pm: 2 cartons  Between 4:30 or 6pm: 1 carton  9000-1000mL free water flushes thorughout the day with feedings and medications (100% of hydration needs).    Formula: Isosource 1.5 shadi  Volume: 5 cartons/day (1250mL, 950 ml free water)  Provisions:  1875kcal (33kcal/kg), 85 g protein (1.5g/kg), 220g CHO, 19g fiber    Recommendations for desired wt gain:   -Increase EN volume by 1/2- 1 carton/day (187-375 additional calories/day)  -Take simethicone drops before and after PM feeding to help reduce GI distress    If Yossi is not able to tolerate increased volume (5 1/2-6 cartons/day) with simethicone drops, may suggest alternative formula such as Twocal.    RD will follow-up with Priya and Yossi in 1 week  - EN tolerance to increased volume  - Weight trends      Magaly Breaux RDN, [facility]  [phone]

## 2020-05-04 ENCOUNTER — ONCOLOGY VISIT (OUTPATIENT)
Dept: ONCOLOGY | Facility: CLINIC | Age: 75
End: 2020-05-04
Payer: MEDICARE

## 2020-05-04 ENCOUNTER — INFUSION THERAPY VISIT (OUTPATIENT)
Dept: INFUSION THERAPY | Facility: CLINIC | Age: 75
End: 2020-05-04
Attending: NURSE PRACTITIONER
Payer: MEDICARE

## 2020-05-04 VITALS
RESPIRATION RATE: 16 BRPM | HEART RATE: 89 BPM | DIASTOLIC BLOOD PRESSURE: 70 MMHG | SYSTOLIC BLOOD PRESSURE: 125 MMHG | WEIGHT: 119.1 LBS | OXYGEN SATURATION: 96 % | TEMPERATURE: 98.2 F | BODY MASS INDEX: 19.82 KG/M2

## 2020-05-04 DIAGNOSIS — C45.0 MESOTHELIOMA (PLEURAL) (H): Primary | ICD-10-CM

## 2020-05-04 LAB
ALBUMIN UR-MCNC: NEGATIVE MG/DL
ANION GAP SERPL CALCULATED.3IONS-SCNC: 4 MMOL/L (ref 3–14)
BASOPHILS # BLD AUTO: 0 10E9/L (ref 0–0.2)
BASOPHILS NFR BLD AUTO: 0.3 %
BUN SERPL-MCNC: 24 MG/DL (ref 7–30)
CALCIUM SERPL-MCNC: 9.7 MG/DL (ref 8.5–10.1)
CHLORIDE SERPL-SCNC: 107 MMOL/L (ref 94–109)
CO2 SERPL-SCNC: 28 MMOL/L (ref 20–32)
CREAT SERPL-MCNC: 0.73 MG/DL (ref 0.66–1.25)
DIFFERENTIAL METHOD BLD: ABNORMAL
EOSINOPHIL # BLD AUTO: 0 10E9/L (ref 0–0.7)
EOSINOPHIL NFR BLD AUTO: 0 %
ERYTHROCYTE [DISTWIDTH] IN BLOOD BY AUTOMATED COUNT: 20.4 % (ref 10–15)
GFR SERPL CREATININE-BSD FRML MDRD: 90 ML/MIN/{1.73_M2}
GLUCOSE SERPL-MCNC: 173 MG/DL (ref 70–99)
HCT VFR BLD AUTO: 37.2 % (ref 40–53)
HGB BLD-MCNC: 11.7 G/DL (ref 13.3–17.7)
IMM GRANULOCYTES # BLD: 0.1 10E9/L (ref 0–0.4)
IMM GRANULOCYTES NFR BLD: 0.4 %
LYMPHOCYTES # BLD AUTO: 0.6 10E9/L (ref 0.8–5.3)
LYMPHOCYTES NFR BLD AUTO: 4.7 %
MAGNESIUM SERPL-MCNC: 2.4 MG/DL (ref 1.6–2.3)
MCH RBC QN AUTO: 26.9 PG (ref 26.5–33)
MCHC RBC AUTO-ENTMCNC: 31.5 G/DL (ref 31.5–36.5)
MCV RBC AUTO: 86 FL (ref 78–100)
MONOCYTES # BLD AUTO: 0.8 10E9/L (ref 0–1.3)
MONOCYTES NFR BLD AUTO: 6.7 %
NEUTROPHILS # BLD AUTO: 11 10E9/L (ref 1.6–8.3)
NEUTROPHILS NFR BLD AUTO: 87.9 %
PLATELET # BLD AUTO: 494 10E9/L (ref 150–450)
POTASSIUM SERPL-SCNC: 4.3 MMOL/L (ref 3.4–5.3)
RBC # BLD AUTO: 4.35 10E12/L (ref 4.4–5.9)
SODIUM SERPL-SCNC: 139 MMOL/L (ref 133–144)
WBC # BLD AUTO: 12.5 10E9/L (ref 4–11)

## 2020-05-04 PROCEDURE — 99207 ZZC NO CHARGE NURSE ONLY: CPT

## 2020-05-04 PROCEDURE — 83735 ASSAY OF MAGNESIUM: CPT | Performed by: NURSE PRACTITIONER

## 2020-05-04 PROCEDURE — 96375 TX/PRO/DX INJ NEW DRUG ADDON: CPT | Performed by: NURSE PRACTITIONER

## 2020-05-04 PROCEDURE — 99207 ZZC NO CHARGE LOS: CPT

## 2020-05-04 PROCEDURE — 96413 CHEMO IV INFUSION 1 HR: CPT | Performed by: NURSE PRACTITIONER

## 2020-05-04 PROCEDURE — 85025 COMPLETE CBC W/AUTO DIFF WBC: CPT | Performed by: NURSE PRACTITIONER

## 2020-05-04 PROCEDURE — 96372 THER/PROPH/DIAG INJ SC/IM: CPT | Mod: 59 | Performed by: NURSE PRACTITIONER

## 2020-05-04 PROCEDURE — 96417 CHEMO IV INFUS EACH ADDL SEQ: CPT | Performed by: NURSE PRACTITIONER

## 2020-05-04 PROCEDURE — 80048 BASIC METABOLIC PNL TOTAL CA: CPT | Performed by: NURSE PRACTITIONER

## 2020-05-04 PROCEDURE — 81003 URINALYSIS AUTO W/O SCOPE: CPT | Performed by: NURSE PRACTITIONER

## 2020-05-04 RX ORDER — MEPERIDINE HYDROCHLORIDE 25 MG/ML
25 INJECTION INTRAMUSCULAR; INTRAVENOUS; SUBCUTANEOUS EVERY 30 MIN PRN
Status: CANCELLED | OUTPATIENT
Start: 2020-05-04

## 2020-05-04 RX ORDER — NALOXONE HYDROCHLORIDE 0.4 MG/ML
.1-.4 INJECTION, SOLUTION INTRAMUSCULAR; INTRAVENOUS; SUBCUTANEOUS
Status: CANCELLED | OUTPATIENT
Start: 2020-05-04

## 2020-05-04 RX ORDER — DIPHENHYDRAMINE HYDROCHLORIDE 50 MG/ML
50 INJECTION INTRAMUSCULAR; INTRAVENOUS
Status: CANCELLED
Start: 2020-05-04

## 2020-05-04 RX ORDER — CYANOCOBALAMIN 1000 UG/ML
1000 INJECTION, SOLUTION INTRAMUSCULAR; SUBCUTANEOUS
Status: DISCONTINUED | OUTPATIENT
Start: 2020-05-04 | End: 2020-05-04 | Stop reason: HOSPADM

## 2020-05-04 RX ORDER — ONDANSETRON 2 MG/ML
8 INJECTION INTRAMUSCULAR; INTRAVENOUS ONCE
Status: COMPLETED | OUTPATIENT
Start: 2020-05-04 | End: 2020-05-04

## 2020-05-04 RX ORDER — ONDANSETRON 2 MG/ML
4 INJECTION INTRAMUSCULAR; INTRAVENOUS EVERY 6 HOURS PRN
Status: CANCELLED
Start: 2020-05-04

## 2020-05-04 RX ORDER — EPINEPHRINE 1 MG/ML
0.3 INJECTION, SOLUTION INTRAMUSCULAR; SUBCUTANEOUS EVERY 5 MIN PRN
Status: CANCELLED | OUTPATIENT
Start: 2020-05-04

## 2020-05-04 RX ORDER — ONDANSETRON 2 MG/ML
8 INJECTION INTRAMUSCULAR; INTRAVENOUS ONCE
Status: CANCELLED
Start: 2020-05-04

## 2020-05-04 RX ORDER — ALBUTEROL SULFATE 90 UG/1
1-2 AEROSOL, METERED RESPIRATORY (INHALATION)
Status: CANCELLED
Start: 2020-05-04

## 2020-05-04 RX ORDER — SODIUM CHLORIDE 9 MG/ML
1000 INJECTION, SOLUTION INTRAVENOUS CONTINUOUS PRN
Status: CANCELLED
Start: 2020-05-04

## 2020-05-04 RX ORDER — EPINEPHRINE 0.3 MG/.3ML
0.3 INJECTION SUBCUTANEOUS EVERY 5 MIN PRN
Status: CANCELLED | OUTPATIENT
Start: 2020-05-04

## 2020-05-04 RX ORDER — ALBUTEROL SULFATE 0.83 MG/ML
2.5 SOLUTION RESPIRATORY (INHALATION)
Status: CANCELLED | OUTPATIENT
Start: 2020-05-04

## 2020-05-04 RX ORDER — LORAZEPAM 2 MG/ML
0.5 INJECTION INTRAMUSCULAR EVERY 4 HOURS PRN
Status: CANCELLED
Start: 2020-05-04

## 2020-05-04 RX ORDER — METHYLPREDNISOLONE SODIUM SUCCINATE 125 MG/2ML
125 INJECTION, POWDER, LYOPHILIZED, FOR SOLUTION INTRAMUSCULAR; INTRAVENOUS
Status: CANCELLED
Start: 2020-05-04

## 2020-05-04 RX ORDER — HEPARIN SODIUM (PORCINE) LOCK FLUSH IV SOLN 100 UNIT/ML 100 UNIT/ML
5 SOLUTION INTRAVENOUS
Status: DISCONTINUED | OUTPATIENT
Start: 2020-05-04 | End: 2020-05-04 | Stop reason: HOSPADM

## 2020-05-04 RX ORDER — HEPARIN SODIUM (PORCINE) LOCK FLUSH IV SOLN 100 UNIT/ML 100 UNIT/ML
500 SOLUTION INTRAVENOUS ONCE
Status: COMPLETED | OUTPATIENT
Start: 2020-05-04 | End: 2020-05-04

## 2020-05-04 RX ADMIN — HEPARIN SODIUM (PORCINE) LOCK FLUSH IV SOLN 100 UNIT/ML 500 UNITS: 100 SOLUTION at 11:52

## 2020-05-04 RX ADMIN — ONDANSETRON 8 MG: 2 INJECTION INTRAMUSCULAR; INTRAVENOUS at 10:04

## 2020-05-04 RX ADMIN — HEPARIN SODIUM (PORCINE) LOCK FLUSH IV SOLN 100 UNIT/ML 5 ML: 100 SOLUTION at 09:05

## 2020-05-04 RX ADMIN — Medication 250 ML: at 10:03

## 2020-05-04 RX ADMIN — CYANOCOBALAMIN 1000 MCG: 1000 INJECTION, SOLUTION INTRAMUSCULAR; SUBCUTANEOUS at 10:09

## 2020-05-04 ASSESSMENT — PAIN SCALES - GENERAL: PAINLEVEL: NO PAIN (0)

## 2020-05-04 NOTE — PROGRESS NOTES
Port needle left accessed for treatment. Tolerated port access and draw without complaint. Port site scrubbed with Chloraprep for 30 seconds and allowed to dry completely prior to dressing application. Accessed using sterile technique. Milan tubes drawn-Red gel/Green/Purple tubes. Double signed by patient and RN. See documentation flowsheet. Ericka Hewitt, RN, BSN, OCN

## 2020-05-04 NOTE — PROGRESS NOTES
Infusion Nursing Note:  Pradeep Figueroa  presents today for C3D1 PEMEtrexed,Carbo,MVASI.    Patient seen by provider today: No   present during visit today: Not Applicable.    Note: N/A.    Intravenous Access:  Implanted Port.    Treatment Conditions:  Lab Results   Component Value Date    HGB 11.7 05/04/2020     Lab Results   Component Value Date    WBC 12.5 05/04/2020      Lab Results   Component Value Date    ANEU 11.0 05/04/2020     Lab Results   Component Value Date     05/04/2020      Lab Results   Component Value Date     05/04/2020                   Lab Results   Component Value Date    POTASSIUM 4.3 05/04/2020           Lab Results   Component Value Date    MAG 2.4 05/04/2020            Lab Results   Component Value Date    CR 0.73 05/04/2020                   Lab Results   Component Value Date    DIMA 9.7 05/04/2020                Lab Results   Component Value Date    BILITOTAL 0.1 04/21/2020           Lab Results   Component Value Date    ALBUMIN 2.7 04/21/2020                    Lab Results   Component Value Date    ALT 31 04/21/2020           Lab Results   Component Value Date    AST 25 04/21/2020       Results reviewed, labs MET treatment parameters, ok to proceed with treatment.  Urine negative protein.  Mag-2.4.      Post Infusion Assessment:  Patient tolerated infusion without incident.  Blood return noted pre and post infusion.  No evidence of extravasations.  Access discontinued per protocol.       Discharge Plan:   Discharge instructions reviewed with: Patient.  Patient and/or family verbalized understanding of discharge instructions and all questions answered.  Patient discharged in stable condition accompanied by: self.  Departure Mode: Ambulatory.    Chika Carrera RN

## 2020-05-05 ENCOUNTER — HOME INFUSION (PRE-WILLOW HOME INFUSION) (OUTPATIENT)
Dept: PHARMACY | Facility: CLINIC | Age: 75
End: 2020-05-05

## 2020-05-05 ENCOUNTER — ONCOLOGY VISIT (OUTPATIENT)
Dept: ONCOLOGY | Facility: CLINIC | Age: 75
End: 2020-05-05
Attending: DIETITIAN, REGISTERED
Payer: MEDICARE

## 2020-05-05 DIAGNOSIS — Z93.1 PEG (PERCUTANEOUS ENDOSCOPIC GASTROSTOMY) STATUS (H): ICD-10-CM

## 2020-05-05 DIAGNOSIS — C45.0 MESOTHELIOMA (PLEURAL) (H): Primary | ICD-10-CM

## 2020-05-05 DIAGNOSIS — C45.9 MESOTHELIOMA (H): ICD-10-CM

## 2020-05-05 DIAGNOSIS — E46 MALNUTRITION, UNSPECIFIED TYPE (H): ICD-10-CM

## 2020-05-05 PROCEDURE — 97803 MED NUTRITION INDIV SUBSEQ: CPT | Mod: ZF | Performed by: DIETITIAN, REGISTERED

## 2020-05-05 NOTE — LETTER
"5/5/2020       RE: Pradeep Figueroa Jr.  [address] [place] [address]     Dear Colleague,    Thank you for referring your patient, Pradeep Figueroa Jr., to the [facility]. Please see a copy of my visit note below.    Pradeep Figueroa Jr. is a 74 year old male who is being evaluated via a billable telephone visit.      The patient has been notified of following:     \"This telephone visit will be conducted via a call between you and your physician/provider. We have found that certain health care needs can be provided without the need for a physical exam.  This service lets us provide the care you need with a short phone conversation.  If a prescription is necessary we can send it directly to your pharmacy.  If lab work is needed we can place an order for that and you can then stop by our lab to have the test done at a later time.    Telephone visits are billed at different rates depending on your insurance coverage. During this emergency period, for some insurers they may be billed the same as an in-person visit.  Please reach out to your insurance provider with any questions.    If during the course of the call the physician/provider feels a telephone visit is not appropriate, you will not be charged for this service.\"    Patient has given verbal consent for Telephone visit?  Yes    CLINICAL NUTRITION SERVICES - REASSESSMENT NOTE   EVALUATION OF PREVIOUS PLAN OF CARE:   Referring Physician: Nuha  Time spent with patient: 30 minutes.    Current diet: NPO  PEG Tube: dependent    RECOMMENDATIONS ORDERED BY RD:   Enteral Nutrition - Followed by [facility]- message sent to [facility] RD with incoming order for new formula  Formula: TwoCal HN  Volume: 4 1/2 cartons/day (1113mL, 780 ml free water)  Provisions:  2250kcal (41kcal/kg), 92 g protein (1.7g/kg), 242g CHO, 6g fiber      Suggested Tube feeding transition schedule  Day 1: 1 carton of TwoCal HN, 4 cartons of Isosource 1.5   Day 2: 2 carton of " TwoCal HN, 3cartons of Isosource 1.5   Day 3: 3 carton of TwoCal HN, 2 cartons of Isosource 1.5   Day 4: 4 carton of TwoCal HN, discontinue Isosource 1.5  Take an additional 1/2 carton daily for a goal of 4 1/2 cartons (as tolerated) to help facilitate weight gain  Suggested 1-1/2 cartons TID   Flush with 90mL water before and after each feeding (540ml)  Flush with additional 120 mL water 6 x/day to meet 100% hydration (2040mL/day)     Monitoring from previous assessment:   -Enteral Nutrition intake:  Formula: Isosource 1.5 shadi  Volume: 5 cartons/day (unable to reach recommended 5 1/2-6 for desired wt gain) - taking 2/2/1 daily with 900-1000mL total water flushes.  Provisions:  1875kcal (33kcal/kg), 85 g protein (1.5g/kg), 220g CHO, 19g fiber     -Weight trends - stable  Wt Readings from Last 8 Encounters:   05/04/20 54 kg (119 lb 1.6 oz)   04/24/20 55.1 kg (121 lb 6.4 oz)   04/08/20 53 kg (116 lb 12.8 oz)   03/31/20 54.6 kg (120 lb 5.9 oz)   03/18/20 56.8 kg (125 lb 4.8 oz)   03/11/20 56.2 kg (123 lb 14.4 oz)   03/04/20 57.7 kg (127 lb 4.8 oz)   02/20/20 57.9 kg (127 lb 10.3 oz)     INTERVENTIONS   Implementation  Collaboration and Referral of Nutrition care with [facility]  RD placed orders for new formula as above   Goals   1. Weight gain towards 125-130 lbs (clinical records/scales); reported weight at home of 110 lbs to increase towards 120 lbs on home scale  2. EN to meet 100% of estimated nutrition needs    Follow up/Monitoring:   -Enteral Nutrition intake  -Weight trends    Magaly Breaux RDN, [facility]  [phone]

## 2020-05-05 NOTE — PROGRESS NOTES
Palliative Care Outpatient Clinic Consultation Note    Patient:  Pradeep Figueroa Jr.    Chief Complaint:   Pradeep Figueroa Jr. is a 74 year old male who is being evaluated via a billable video visit today at the request of Dr Breen for a palliative care consultation secondary to mesothelioma.      The patient's primary care provider is:  Feliz Majano.     The patient is on the call ***    History of Present Illness:  Medical History:  - malignant mesothelioma diagnosed Feb 2020 during workup of progressive dysphagia. Unresectable, esophageal involvement.    - on pemetrexed/carboplatin/bevacizumab. CT stable after 2 cycles   - s/p PEG 3/30/20     Introduced the scope of our practice to ***. Discussed our potential roles for symptom management, support/coping, and decisional support (aka goals of care).  ***    Patient's Disease Understanding: ***  Prognostic information: ***    Coping:  ***    Functional Status:  {palliative ECOG for data (Optional):980475}    Social History  Living Situation: ***  Children: ***  Actual/Potential Caregiver(s): ***  Support System: ***  Occupation: ***  Hobbies: ***  Substance Use/History of misuse: ***  Financial Concerns: ***  Spiritual Background: ***  Spiritual Concerns/Needs: ***  Social History     Tobacco Use     Smoking status: Never Smoker     Smokeless tobacco: Never Used   Substance Use Topics     Alcohol use: Not Currently     Drug use: Never         Sexual Health:  How has the illness/treatment affected intimacy and body image? ***  - physical: ***  - emotional: ***  - relationship: ***  - environmental: ***    Advance Care Planning:  Goals of Care: ***  Decision Making Capacity: ***  Advance Directive:    Dated July 2014. Designated his wife Nataliia with his step-daughter Edna as the alternate  Where is written copy located: ***  Health Care Agent Contact Information: ***  POLST:   ***    Recommendations & Counseling:  ***      Return to clinic in ***.    Data and  Chart Review:    REVIEW OF SYSTEMS:   ROS: 10 point ROS neg other than the symptoms noted above in the HPI *** and here.      Physical Exam:  Vital Signs not checked, virtual visit    CONSTIT: awake, appears ***comfortable  EENT: MM***, EOMI, no icterus  RESP: reg, *** effort, *** cough  MSK: moves x4, ROM***  SKIN: no rash, no obvious lesions  NEURO: alert, oriented x3  PSYCH: *** affect, memory and thought process intact    The rest of a comprehensive physical examination is deferred  due to public [facility] emergency video visit restrictions.    Medications, current, pertinent:  Oxycodone 5 to 10 mg every 4 hours as needed  Acetaminophen as needed    Lorazepam as needed    : ***    No Known Allergies  Past Medical History:   Diagnosis Date     Mesothelioma (H)      Seizures (H)      Past Surgical History:   Procedure Laterality Date     ENDOSCOPIC ULTRASOUND UPPER GASTROINTESTINAL TRACT (GI) N/A 2/20/2020    Procedure: ENDOSCOPIC ULTRASOUND WITH FINE NEEDLE ASPIRATION, ESOPHAGOSCOPY / UPPER GASTROINTESTINAL TRACT (GI) WITH BIOPSY  BIOPSY;  Surgeon: Salazar Sepulveda MD;  Location: [facility] OR     INSERT PORT VASCULAR ACCESS Right 3/17/2020    Procedure: INSERTION, VASCULAR ACCESS PORT RIGHT INTERNAL JUGULAR;  Surgeon: Sandeep Ramirez DO;  Location:  OR       Family History  No family history on file.      Lab and imaging data Reviewed:  Comments:   GFR greater than 90  CT 4/21:1. No significant change in the appearance of multiple enhancing  plaque-like pleural thickenings in the left pleura. The most  aggressive lesion is in the posterior medial aspect of the left  pleural space in the fourth-fifth intercostal region with invasion of  the posterior paraspinous musculature, adjacent fourth rib with  extension to the left T4-5 neural foramen.  2. Grossly no significant change in the soft tissue surrounding the  distal one third of the esophagus. Stable dilatation of the proximal  esophagus likely from a  combination of achalasia and mass effect from  the paraesophageal mass  3. Bilateral pleural effusion, slightly decreased in the left pleural  space since prior exams        Opioid Risk Tool:   Opioid Risk Tool (ORT):    Family History of Substance Abuse:        Alcohol = {ORT FHalcohol:264889}       Illegal Drugs = {ORT FHillicits:416418}       Prescription Drugs = {ORT FHRx:761277}      Personal History of Substance Abuse:       Alcohol = {ORT PHalcohol:871752}       Illegal Drugs = {ORT PHillicits:421754}       Prescription Drugs = {ORT PHRx:832079}        Age: {ORT age:551089}      Psychological Disease: {ORT psych 2:307701}      ORT Score = ***        0-3: Low risk for opioid abuse       4-7: Moderate risk for opioid abuse       >/= 8: High risk for opioid abuse    [facility] Opioid Prescribing Safety Plan    Opioid Safety Education: Reviewed by ***Ramiro Love RN on 5/5/2020  Opioid Risk Assessment: Performed by Janice Brown MD on 5/5/2020.  ORT score of ***.   Mood Disorder Assessment: Performed by Janice Brown MD on 5/5/2020.     reviewed with every prescription, last reviewed by Janice Brown MD on 05/05/2020 ***    Additional recommendations based on patient's prognosis and indication for opioids include the following:    {{Clinicians, these are for chronic opioid prescribing in palliative clinic. Not necessarily for one-time acute pain prescribing. Delete unnecessary text including opioid indication descriptions and non-applicable safety plans, but leave the appropriate safety plan in your note and carry it forward to subsequent notes so it is easily accessible.     Strong opioid indication = Pain directly from active cancer or other direct/obvious tissue injury pain including recent surgical pain and wound pain.    Moderate indication = chronic pain after curative cancer treatment (eg neuropathy, osteonecrosis, in-remission myeloma bone pain, radiation  plexopathies); pain from serious, life-limiting, non-modifiable, functionally impairing illness that is not primarily amenable to rehabilitation and has not responded adequately to non-opioid therapies (this could include ALS, sickle cell disease, other neurodegenerative diseases/spasticity syndromes, severe systemic sclerosis or inflammatory arthritides, chronic chest wall pain from late stage CF, abdominal pain from decompensated cirrhosis without available disease modifying options, etc).    Weak indication = all others, including chronic non-cancer pain (eg cLBP, fibromyalgia, chronic headaches) in patients who also have active cancer. With rare exceptions we should not prescribe patients opioids for those indications, outside of helping patients to taper off. For patients with short prognoses who are chronically on opioids, however, the burdens to the patient of tapering off is typically not worth the benefit due to lack of time for rehabilitation, etc.}}        Expected prognosis: shorter  Risk: Low or Medium (ORT 0-7)  No further recommendations.     Expected prognosis: shorter  Risk: High (ORT>7)  Baseline UDT performed on ***.   Pill Counts to be performed, plan and last count: ***.   Naloxone Script provided: ***.   Counseling Support Plan:  ***.     Expected prognosis >1 year  Risk: Low (ORT<4)  Indication for Opioid Use: Weak  Baseline and Annual UDT: Date of last urine drug screen was ***.   Counseling Support Plan: ***.   Naloxone Script provided if patient also on benzodiazepine: ***  Tapering plan: ***    Expected prognosis >1 year  Risk: Low (ORT>4)  Indication for Opioid Use: Moderate or Strong  Baseline UDT performed on: ***  Naloxone Script provided if patient also on benzodiazepine: ***  Indications for Tapering reviewed: ***    Prognosis >1 year  Risk: Medium (ORT4-7)  Indication for Opioid Use: Weak  Counseling Support Plan: ***  Baseline and Annual UDT last performed on: ***  Naloxone Script  "provided if patient also on benzodiazepine: ***  Tapering Plan: ***    Prognosis >1 year  Risk: Medium (ORT4-7)  Indication for Opioid Use: Moderate or Strong  Baseline and Annual UDT last performed on: ***  Naloxone Script provided if patient also on benzodiazepine: ***   Indications for Tapering reviewed: ***    Prognosis >1 year  Risk: High (ORT >7)  Indication for Opioid Use: Weak  Avoid Opioids  Tapering plan: ***    Prognosis >1 year  Risk: High (ORT>7)  Indication for Opioid Use: Moderate or Strong  Baseline and Ongoing UDT last performed on:***  Naloxone Script provided on: ***.   Pill Counts to be performed, plan & last pill count: ***  Indications for Tapering reviewed: ***  Counseling Support: ***      Video-Visit Details    Type of service:  Video Visit    Physician has received verbal consent for a Video Visit from the patient? {YES-NO  Default Yes:4444::\"Yes\"}    Video Start Time: {video visit start time:152948}    Video End Time (time video stopped): ***    Originating Location (pt. Location): {video visit patient location:782937::\"Home\"}    Distant Location (provider location):  [facility]     Mode of Communication:  Video Conference via [facility]      Janice Brown MD  Palliative Medicine  Pager [phone]    "

## 2020-05-05 NOTE — PROGRESS NOTES
"Pradeep Figueroa Jr. is a 74 year old male who is being evaluated via a billable telephone visit.      The patient has been notified of following:     \"This telephone visit will be conducted via a call between you and your physician/provider. We have found that certain health care needs can be provided without the need for a physical exam.  This service lets us provide the care you need with a short phone conversation.  If a prescription is necessary we can send it directly to your pharmacy.  If lab work is needed we can place an order for that and you can then stop by our lab to have the test done at a later time.    Telephone visits are billed at different rates depending on your insurance coverage. During this emergency period, for some insurers they may be billed the same as an in-person visit.  Please reach out to your insurance provider with any questions.    If during the course of the call the physician/provider feels a telephone visit is not appropriate, you will not be charged for this service.\"    Patient has given verbal consent for Telephone visit?  Yes    CLINICAL NUTRITION SERVICES - REASSESSMENT NOTE   EVALUATION OF PREVIOUS PLAN OF CARE:   Referring Physician: Nuha  Time spent with patient: 30 minutes.    Current diet: NPO  PEG Tube: dependent    RECOMMENDATIONS ORDERED BY RD:   Enteral Nutrition - Followed by [facility]- message sent to [facility] RD with incoming order for new formula  Formula: TwoCal HN  Volume: 4 1/2 cartons/day (1113mL, 780 ml free water)  Provisions:  2250kcal (41kcal/kg), 92 g protein (1.7g/kg), 242g CHO, 6g fiber      Suggested Tube feeding transition schedule  Day 1: 1 carton of TwoCal HN, 4 cartons of Isosource 1.5   Day 2: 2 carton of TwoCal HN, 3cartons of Isosource 1.5   Day 3: 3 carton of TwoCal HN, 2 cartons of Isosource 1.5   Day 4: 4 carton of TwoCal HN, discontinue Isosource 1.5  Take an additional 1/2 carton daily for a goal of 4 1/2 cartons (as tolerated) to help facilitate " weight gain  Suggested 1-1/2 cartons TID   Flush with 90mL water before and after each feeding (540ml)  Flush with additional 120 mL water 6 x/day to meet 100% hydration (2040mL/day)     Monitoring from previous assessment:   -Enteral Nutrition intake:  Formula: Isosource 1.5 shadi  Volume: 5 cartons/day (unable to reach recommended 5 1/2-6 for desired wt gain) - taking 2/2/1 daily with 900-1000mL total water flushes.  Provisions:  1875kcal (33kcal/kg), 85 g protein (1.5g/kg), 220g CHO, 19g fiber     -Weight trends - stable  Wt Readings from Last 8 Encounters:   05/04/20 54 kg (119 lb 1.6 oz)   04/24/20 55.1 kg (121 lb 6.4 oz)   04/08/20 53 kg (116 lb 12.8 oz)   03/31/20 54.6 kg (120 lb 5.9 oz)   03/18/20 56.8 kg (125 lb 4.8 oz)   03/11/20 56.2 kg (123 lb 14.4 oz)   03/04/20 57.7 kg (127 lb 4.8 oz)   02/20/20 57.9 kg (127 lb 10.3 oz)     INTERVENTIONS   Implementation  Collaboration and Referral of Nutrition care with [facility]  RD placed orders for new formula as above   Goals   1. Weight gain towards 125-130 lbs (clinical records/scales); reported weight at home of 110 lbs to increase towards 120 lbs on home scale  2. EN to meet 100% of estimated nutrition needs    Follow up/Monitoring:   -Enteral Nutrition intake  -Weight trends    Magaly Breaux RDN, [facility] [place]  [phone]

## 2020-05-06 ENCOUNTER — VIRTUAL VISIT (OUTPATIENT)
Dept: ONCOLOGY | Facility: CLINIC | Age: 75
End: 2020-05-06
Payer: MEDICARE

## 2020-05-06 DIAGNOSIS — C45.0 MESOTHELIOMA (PLEURAL) (H): ICD-10-CM

## 2020-05-06 DIAGNOSIS — L08.9 SKIN INFECTION AT GASTROSTOMY TUBE SITE (H): ICD-10-CM

## 2020-05-06 DIAGNOSIS — C45.0 MESOTHELIOMA (PLEURAL) (H): Primary | ICD-10-CM

## 2020-05-06 DIAGNOSIS — K94.22 SKIN INFECTION AT GASTROSTOMY TUBE SITE (H): ICD-10-CM

## 2020-05-06 PROCEDURE — 99204 OFFICE O/P NEW MOD 45 MIN: CPT | Mod: 95 | Performed by: HOSPITALIST

## 2020-05-06 RX ORDER — CEPHALEXIN 500 MG/1
500 CAPSULE ORAL 3 TIMES DAILY
Qty: 30 CAPSULE | Refills: 0 | Status: SHIPPED | OUTPATIENT
Start: 2020-05-06 | End: 2020-05-28

## 2020-05-06 NOTE — PATIENT INSTRUCTIONS
Patient Instructions      Expand All Collapse All    Thank you for coming into the Palliative Care Clinic today.     Return to clinic in 3-4 months for a follow up.     You can reach the Palliative Care Team during business hours at the following number:    - [phone]    To reach the Palliative Care Provider on-call After-hours or on holidays and weekends, call: [phone].  Please note that we are not able to provide pain medication refills on evenings or weekends.

## 2020-05-06 NOTE — PROGRESS NOTES
This is a recent snapshot of the patient's [facility] medical record.  For current drug dose and complete information and questions, call [phone] or In Basket pool, [facility] home infusion ([id number])  CSN Number:  [id number]

## 2020-05-06 NOTE — PROGRESS NOTES
Palliative Care Outpatient Clinic Consultation Note    Patient:  Pradeep Figueroa Jr.    Chief Complaint:   Pradeep Figueroa Jr. is a 74 year old male who is being evaluated via a billable video visit today at the request of Dr Breen for a palliative care consultation secondary to mesothelioma.      The patient's primary care provider is:  Feliz Majano.     The patient is on the call with his wife Nataliia    History of Present Illness:  Medical History:  - malignant mesothelioma diagnosed Feb 2020 during workup of progressive dysphagia. Unresectable, esophageal involvement.    - on pemetrexed/carboplatin/bevacizumab. CT stable after 2 cycles   - s/p PEG 3/30/20     Introduced the scope of our practice to Yossi and Nataliia. Discussed our potential roles for symptom management, support/coping, and decisional support (aka goals of care).    Yossi had an episode of significant abdominal pain which appears to have been due to     He has acid reflux, well controlled with omeprazole.     At night he often has loud abdominal churning/rumbling mostly at night. This is bothersome due to the sound rather than actual discomfort.     He gets winded easily. He walks regularly, but is much less active than before.     Patient's Disease Understanding: good. He knows that treatment isn't curative. He also realizes that while the cancer did stabilize with treatments it hasn't shrunk, and that Dr Breen is considering switching his therapies    Coping:    Better and worse days. Struggles that he can't be out and about as much as he'd like to.   Yossi shares that the most difficult aspect for him is not to be able to eat solid food.     Functional Status:  1 (Restricted in physically strenuous activity but ambulatory and able to carry out work of a light or sedentary nature)    Social History  Living Situation: with his wife    Children: Nataliia's daughter Edna is close to him    Spiritual Background: Not very important to Yossi, but  Nataliia's lucita is a source of strength for her.     Social History     Tobacco Use     Smoking status: Never Smoker     Smokeless tobacco: Never Used   Substance Use Topics     Alcohol use: Not Currently     Drug use: Never     Advance Care Planning:  Goals of Care: hasn't thought about this a lot, currently goals are to live as long as possible.     Decision Making Capacity: intact    Advance Directive:    Dated 2014. Designated his wife Nataliia with his step-daughter Edna as the alternate  Confirmed today.     Recommendations & Counseliny/o man with malignant mesothelioma involving the esophagus on chemotherapy    No treatment changes today.      Return to clinic in 3-4 months.    Data and Chart Review:    REVIEW OF SYSTEMS:   ROS: 10 point ROS neg other than the symptoms noted above in the HPI and here.      Physical Exam:  Vital Signs not checked, virtual visit    CONSTIT: awake, appears comfortable  EENT: MMM, EOMI, no icterus  RESP: reg, nl effort, no cough  SKIN: no rash, no obvious lesions  NEURO: alert, oriented x3  PSYCH: appropriate affect, memory and thought process intact    The rest of a comprehensive physical examination is deferred  due to public health emergency video visit restrictions.    Medications, current, pertinent:  Oxycodone 5 to 10 mg every 4 hours as needed  Acetaminophen as needed    Lorazepam as needed    : ok    No Known Allergies  Past Medical History:   Diagnosis Date     Mesothelioma (H)      Seizures (H)      Past Surgical History:   Procedure Laterality Date     ENDOSCOPIC ULTRASOUND UPPER GASTROINTESTINAL TRACT (GI) N/A 2020    Procedure: ENDOSCOPIC ULTRASOUND WITH FINE NEEDLE ASPIRATION, ESOPHAGOSCOPY / UPPER GASTROINTESTINAL TRACT (GI) WITH BIOPSY  BIOPSY;  Surgeon: Salazar Sepulveda MD;  Location: [facility] OR     INSERT PORT VASCULAR ACCESS Right 3/17/2020    Procedure: INSERTION, VASCULAR ACCESS PORT RIGHT INTERNAL JUGULAR;  Surgeon: Sandeep Ramirez,  DO;  Location: [facility] OR       Family History  No family history on file.      Lab and imaging data Reviewed:  Comments:   GFR greater than 90  CT 4/21:1. No significant change in the appearance of multiple enhancing  plaque-like pleural thickenings in the left pleura. The most  aggressive lesion is in the posterior medial aspect of the left  pleural space in the fourth-fifth intercostal region with invasion of  the posterior paraspinous musculature, adjacent fourth rib with  extension to the left T4-5 neural foramen.  2. Grossly no significant change in the soft tissue surrounding the  distal one third of the esophagus. Stable dilatation of the proximal  esophagus likely from a combination of achalasia and mass effect from  the paraesophageal mass  3. Bilateral pleural effusion, slightly decreased in the left pleural  space since prior exams      Video-Visit Details    Type of service:  Video Visit    Physician has received verbal consent for a Video Visit from the patient? Yes    Video Start Time: 8:15    Video End Time (time video stopped): 9:02    Originating Location (pt. Location): Home    Distant Location (provider location):  [facility]     Mode of Communication:  Video Conference via [facility]      Janice Brown MD  Palliative Medicine  Pager [phone]

## 2020-05-08 ENCOUNTER — HOME INFUSION (PRE-WILLOW HOME INFUSION) (OUTPATIENT)
Dept: PHARMACY | Facility: CLINIC | Age: 75
End: 2020-05-08

## 2020-05-08 ENCOUNTER — TELEPHONE (OUTPATIENT)
Dept: NUTRITION | Facility: CLINIC | Age: 75
End: 2020-05-08

## 2020-05-08 NOTE — TELEPHONE ENCOUNTER
Nutrition Services:     Called Yossi today to check in on EN tolerance.   He tells me that he is feeling much better today since he has not taken TwoCal HN.    He is reluctant to take TwoCal again since taking only 1/2-1 carton seemed to make him so sick.     He switched back to Isosource 1.5 shadi today and is feeling much better.    He expressed his desire to discontinue trial of TwoCal HN and switch back to Isosource 1.5 shadi.     Here is what he has desired to try:  1. Switch back to Isosource 1.5 shadi  2. Work his way towards 5 1/2 cartons consistently for a few days  3. Work his way towards 6 cartons/day consistently    AM feeding - 2 1/2 cartons Isosource 1.5 shadi  Lunch feeding - 2 1/2 cartons Isosource 1.5 shadi  Dinner feeding - 1 carton Isosource 1.5 shadi    RD advised him to take only 30 mL water before/after AM/Lunch feeding to prevent fullness with larger volume.  He will continue his normal fluid/flush regimen with remaining feeding and med routine.     CEDRICK sent message to [facility] regarding change in formula.     Magaly Breaux RDN, [facility] [place]  [phone]

## 2020-05-09 NOTE — PROGRESS NOTES
Spoke with Nataliia-wife and she stated that Yossi is still having pain in the left abdominal about 2 inches below the G-tube area even with the oxycodone that he is taking.  He is currently taking 2 tablets every  4 hours and alternating with tylenol.  That seems to bring the pain down to about a 5/10.  The pain seems to be worse with when he is sitting up and with any twisting movement.  It's ok when he isn't doing any activities.  He seems to have no problems when standing other than a little soreness.   He is scheduled for a CT scan tomorrow, moved up by a week due to this new onset of pain.  Also advised that if the pain is that severe and not well controlled that he go to the ER.  They would prefer not to, but will see how things go.    She also stated that Yossi is very constipated.  He is taking miralax bid, but it doesn't seem to be doing much of anything.  She started him on some MOM and that is making his stomach rumble quite a bit, but seems to be working a little bit.  He refused the MOM today and just took the miralax.  Advised that she can also try senna-s, dulcolax, or mag-citrate.  Let her know this will be forwarded to  for advise and will let her know what he recommends.  She agreed with and verbalized understanding of the plan of care.     Speaking Coherently

## 2020-05-11 NOTE — PROGRESS NOTES
This is a recent snapshot of the patient's [facility] medical record.  For current drug dose and complete information and questions, call [phone] or In Basket pool, [facility] home infusion ([id number])  CSN Number:  [id number]

## 2020-05-18 DIAGNOSIS — C45.0 MESOTHELIOMA (PLEURAL) (H): ICD-10-CM

## 2020-05-27 ENCOUNTER — PATIENT OUTREACH (OUTPATIENT)
Dept: ONCOLOGY | Facility: CLINIC | Age: 75
End: 2020-05-27

## 2020-05-28 ENCOUNTER — ONCOLOGY VISIT (OUTPATIENT)
Dept: ONCOLOGY | Facility: CLINIC | Age: 75
End: 2020-05-28
Payer: MEDICARE

## 2020-05-28 ENCOUNTER — INFUSION THERAPY VISIT (OUTPATIENT)
Dept: INFUSION THERAPY | Facility: CLINIC | Age: 75
End: 2020-05-28
Payer: MEDICARE

## 2020-05-28 ENCOUNTER — VIRTUAL VISIT (OUTPATIENT)
Dept: ONCOLOGY | Facility: CLINIC | Age: 75
End: 2020-05-28
Payer: MEDICARE

## 2020-05-28 VITALS
WEIGHT: 124.3 LBS | BODY MASS INDEX: 20.71 KG/M2 | TEMPERATURE: 97.8 F | HEART RATE: 95 BPM | RESPIRATION RATE: 18 BRPM | DIASTOLIC BLOOD PRESSURE: 82 MMHG | HEIGHT: 65 IN | OXYGEN SATURATION: 95 % | SYSTOLIC BLOOD PRESSURE: 144 MMHG

## 2020-05-28 VITALS — SYSTOLIC BLOOD PRESSURE: 126 MMHG | HEART RATE: 83 BPM | DIASTOLIC BLOOD PRESSURE: 77 MMHG

## 2020-05-28 DIAGNOSIS — C45.0 MESOTHELIOMA (PLEURAL) (H): Primary | ICD-10-CM

## 2020-05-28 DIAGNOSIS — R11.0 NAUSEA: ICD-10-CM

## 2020-05-28 DIAGNOSIS — G40.909 SEIZURE DISORDER (H): ICD-10-CM

## 2020-05-28 DIAGNOSIS — E46 MALNUTRITION, UNSPECIFIED TYPE (H): ICD-10-CM

## 2020-05-28 DIAGNOSIS — K21.9 GASTROESOPHAGEAL REFLUX DISEASE, ESOPHAGITIS PRESENCE NOT SPECIFIED: ICD-10-CM

## 2020-05-28 LAB
ALBUMIN UR-MCNC: NEGATIVE MG/DL
ANION GAP SERPL CALCULATED.3IONS-SCNC: 4 MMOL/L (ref 3–14)
BASOPHILS # BLD AUTO: 0 10E9/L (ref 0–0.2)
BASOPHILS NFR BLD AUTO: 0.5 %
BUN SERPL-MCNC: 24 MG/DL (ref 7–30)
CALCIUM SERPL-MCNC: 9 MG/DL (ref 8.5–10.1)
CHLORIDE SERPL-SCNC: 108 MMOL/L (ref 94–109)
CO2 SERPL-SCNC: 27 MMOL/L (ref 20–32)
CREAT SERPL-MCNC: 0.66 MG/DL (ref 0.66–1.25)
DIFFERENTIAL METHOD BLD: ABNORMAL
EOSINOPHIL # BLD AUTO: 0.1 10E9/L (ref 0–0.7)
EOSINOPHIL NFR BLD AUTO: 1 %
ERYTHROCYTE [DISTWIDTH] IN BLOOD BY AUTOMATED COUNT: 22.9 % (ref 10–15)
GFR SERPL CREATININE-BSD FRML MDRD: >90 ML/MIN/{1.73_M2}
GLUCOSE SERPL-MCNC: 145 MG/DL (ref 70–99)
HCT VFR BLD AUTO: 37.5 % (ref 40–53)
HGB BLD-MCNC: 11.8 G/DL (ref 13.3–17.7)
IMM GRANULOCYTES # BLD: 0 10E9/L (ref 0–0.4)
IMM GRANULOCYTES NFR BLD: 0.3 %
LYMPHOCYTES # BLD AUTO: 0.6 10E9/L (ref 0.8–5.3)
LYMPHOCYTES NFR BLD AUTO: 8.2 %
MAGNESIUM SERPL-MCNC: 2.4 MG/DL (ref 1.6–2.3)
MCH RBC QN AUTO: 27.9 PG (ref 26.5–33)
MCHC RBC AUTO-ENTMCNC: 31.5 G/DL (ref 31.5–36.5)
MCV RBC AUTO: 89 FL (ref 78–100)
MONOCYTES # BLD AUTO: 1.1 10E9/L (ref 0–1.3)
MONOCYTES NFR BLD AUTO: 14.2 %
NEUTROPHILS # BLD AUTO: 5.9 10E9/L (ref 1.6–8.3)
NEUTROPHILS NFR BLD AUTO: 75.8 %
PLATELET # BLD AUTO: 367 10E9/L (ref 150–450)
POTASSIUM SERPL-SCNC: 4.3 MMOL/L (ref 3.4–5.3)
RBC # BLD AUTO: 4.23 10E12/L (ref 4.4–5.9)
SODIUM SERPL-SCNC: 139 MMOL/L (ref 133–144)
WBC # BLD AUTO: 7.8 10E9/L (ref 4–11)

## 2020-05-28 PROCEDURE — 99207 ZZC NO CHARGE NURSE ONLY: CPT

## 2020-05-28 PROCEDURE — 99207 ZZC NO CHARGE LOS: CPT

## 2020-05-28 PROCEDURE — 96411 CHEMO IV PUSH ADDL DRUG: CPT | Performed by: NURSE PRACTITIONER

## 2020-05-28 PROCEDURE — 99214 OFFICE O/P EST MOD 30 MIN: CPT | Mod: 95 | Performed by: NURSE PRACTITIONER

## 2020-05-28 PROCEDURE — 83735 ASSAY OF MAGNESIUM: CPT | Performed by: NURSE PRACTITIONER

## 2020-05-28 PROCEDURE — 96375 TX/PRO/DX INJ NEW DRUG ADDON: CPT | Performed by: NURSE PRACTITIONER

## 2020-05-28 PROCEDURE — 85025 COMPLETE CBC W/AUTO DIFF WBC: CPT | Performed by: NURSE PRACTITIONER

## 2020-05-28 PROCEDURE — 96417 CHEMO IV INFUS EACH ADDL SEQ: CPT | Performed by: NURSE PRACTITIONER

## 2020-05-28 PROCEDURE — 81003 URINALYSIS AUTO W/O SCOPE: CPT | Performed by: NURSE PRACTITIONER

## 2020-05-28 PROCEDURE — 96413 CHEMO IV INFUSION 1 HR: CPT | Performed by: NURSE PRACTITIONER

## 2020-05-28 PROCEDURE — 80048 BASIC METABOLIC PNL TOTAL CA: CPT | Performed by: NURSE PRACTITIONER

## 2020-05-28 RX ORDER — HEPARIN SODIUM (PORCINE) LOCK FLUSH IV SOLN 100 UNIT/ML 100 UNIT/ML
500 SOLUTION INTRAVENOUS ONCE
Status: COMPLETED | OUTPATIENT
Start: 2020-05-28 | End: 2020-05-28

## 2020-05-28 RX ORDER — NALOXONE HYDROCHLORIDE 0.4 MG/ML
.1-.4 INJECTION, SOLUTION INTRAMUSCULAR; INTRAVENOUS; SUBCUTANEOUS
Status: CANCELLED | OUTPATIENT
Start: 2020-05-28

## 2020-05-28 RX ORDER — HEPARIN SODIUM (PORCINE) LOCK FLUSH IV SOLN 100 UNIT/ML 100 UNIT/ML
5 SOLUTION INTRAVENOUS
Status: DISCONTINUED | OUTPATIENT
Start: 2020-05-28 | End: 2020-05-28 | Stop reason: HOSPADM

## 2020-05-28 RX ORDER — LORAZEPAM 2 MG/ML
0.5 INJECTION INTRAMUSCULAR EVERY 4 HOURS PRN
Status: CANCELLED
Start: 2020-05-28

## 2020-05-28 RX ORDER — EPINEPHRINE 0.3 MG/.3ML
0.3 INJECTION SUBCUTANEOUS EVERY 5 MIN PRN
Status: CANCELLED | OUTPATIENT
Start: 2020-05-28

## 2020-05-28 RX ORDER — METHYLPREDNISOLONE SODIUM SUCCINATE 125 MG/2ML
125 INJECTION, POWDER, LYOPHILIZED, FOR SOLUTION INTRAMUSCULAR; INTRAVENOUS
Status: CANCELLED
Start: 2020-05-28

## 2020-05-28 RX ORDER — CYANOCOBALAMIN 1000 UG/ML
1000 INJECTION, SOLUTION INTRAMUSCULAR; SUBCUTANEOUS ONCE
Status: CANCELLED | OUTPATIENT
Start: 2020-05-28

## 2020-05-28 RX ORDER — MEPERIDINE HYDROCHLORIDE 25 MG/ML
25 INJECTION INTRAMUSCULAR; INTRAVENOUS; SUBCUTANEOUS EVERY 30 MIN PRN
Status: CANCELLED | OUTPATIENT
Start: 2020-05-28

## 2020-05-28 RX ORDER — ONDANSETRON 2 MG/ML
8 INJECTION INTRAMUSCULAR; INTRAVENOUS ONCE
Status: COMPLETED | OUTPATIENT
Start: 2020-05-28 | End: 2020-05-28

## 2020-05-28 RX ORDER — ALBUTEROL SULFATE 0.83 MG/ML
2.5 SOLUTION RESPIRATORY (INHALATION)
Status: CANCELLED | OUTPATIENT
Start: 2020-05-28

## 2020-05-28 RX ORDER — ONDANSETRON 2 MG/ML
8 INJECTION INTRAMUSCULAR; INTRAVENOUS ONCE
Status: CANCELLED
Start: 2020-05-28

## 2020-05-28 RX ORDER — EPINEPHRINE 1 MG/ML
0.3 INJECTION, SOLUTION INTRAMUSCULAR; SUBCUTANEOUS EVERY 5 MIN PRN
Status: CANCELLED | OUTPATIENT
Start: 2020-05-28

## 2020-05-28 RX ORDER — SODIUM CHLORIDE 9 MG/ML
1000 INJECTION, SOLUTION INTRAVENOUS CONTINUOUS PRN
Status: CANCELLED
Start: 2020-05-28

## 2020-05-28 RX ORDER — ONDANSETRON 2 MG/ML
4 INJECTION INTRAMUSCULAR; INTRAVENOUS EVERY 6 HOURS PRN
Status: CANCELLED
Start: 2020-05-28

## 2020-05-28 RX ORDER — ALBUTEROL SULFATE 90 UG/1
1-2 AEROSOL, METERED RESPIRATORY (INHALATION)
Status: CANCELLED
Start: 2020-05-28

## 2020-05-28 RX ORDER — DIPHENHYDRAMINE HYDROCHLORIDE 50 MG/ML
50 INJECTION INTRAMUSCULAR; INTRAVENOUS
Status: CANCELLED
Start: 2020-05-28

## 2020-05-28 RX ADMIN — HEPARIN SODIUM (PORCINE) LOCK FLUSH IV SOLN 100 UNIT/ML 5 ML: 100 SOLUTION at 08:08

## 2020-05-28 RX ADMIN — HEPARIN SODIUM (PORCINE) LOCK FLUSH IV SOLN 100 UNIT/ML 500 UNITS: 100 SOLUTION at 11:34

## 2020-05-28 RX ADMIN — ONDANSETRON 8 MG: 2 INJECTION INTRAMUSCULAR; INTRAVENOUS at 09:32

## 2020-05-28 RX ADMIN — Medication 250 ML: at 09:15

## 2020-05-28 ASSESSMENT — PAIN SCALES - GENERAL
PAINLEVEL: NO PAIN (0)
PAINLEVEL: NO PAIN (0)

## 2020-05-28 ASSESSMENT — MIFFLIN-ST. JEOR: SCORE: 1220.7

## 2020-05-28 NOTE — PROGRESS NOTES
Oncology Follow Up Visit: May 28, 2020     Oncologist: Dr Myles Breen  PCP: Feliz Majano    Diagnosis: Pleural Mesothelioma  Pradeep Figueroa Jr.is a 75 yo male with history of epilepsy who presented to clinic in 7/2019 with progressive dysphagia. Difficult work up by GI with initial finding of achalasia. CT chest/abd/pelvis 2/18/20 showed significant circumferential mid-distal esophageal thickening with upstream dilation which was atypical for type II achalasia.  He was found to have an incidental moderate sized left sided pleural effusion   Along with some thickening in the parietal pleural lining and the right lower lobe and also thickening of the pleural lining in the mediastinum around the esophagus.Further endoscopy and EUS showed esophageal stricture with thickening 30 cm from incisors within mediastinum but outside esophageal wall. Biopsies of the specimen showed Loss of BAP1 protein expression that is  consistent   with malignant mesothelioma, epithelioid type based on this limited biopsy.    Treatment:   3/18/2020 began pemetrexed/carboplatin/bevacizumab    Interval History: Mr Figueroa is contacted today prior to continuation of treatment with pemetrexed/carboplatin/bevacizumab for his mesothelioma.  Patient shares he feels like he is doing well he is starting to gain weight and making some changes with his tube feedings initially trying a new formula getting back to his old formula tolerance and is now up 5 pounds.  Told me concerns today.  Patient is reporting that he does have some nausea shortly after treatment but he is using his antiemetics-he is not using any oral fluids at this time and very little fluids.  He does report which is very helpful for his stomach and would like to continue otherwise denies any pain pain mouth sores shortness of breath fevers change in bowel or bladder weakness or.  Does report that he has some occasional trouble with sleep  be surrounding his infusions.  No recent  "seizures.  Rest of comprehensive and complete ROS is reviewed and is negative.   Past Medical History:   Diagnosis Date     Mesothelioma (H)      Seizures (H)      Current Outpatient Medications   Medication     acetaminophen (TYLENOL) 325 MG tablet     albuterol (PROAIR HFA/PROVENTIL HFA/VENTOLIN HFA) 108 (90 Base) MCG/ACT inhaler     alendronate (FOSAMAX) 70 MG tablet     cephALEXin (KEFLEX) 500 MG capsule     cholecalciferol 25 MCG (1000 UT) TABS     cimetidine (TAGAMET HB) 200 MG tablet     dexamethasone (DECADRON) 4 MG tablet     folic acid (FOLVITE) 1 MG tablet     guaiFENesin-codeine (ROBITUSSIN AC) 100-10 MG/5ML solution     lamoTRIgine (LAMICTAL) 200 MG tablet     LORazepam (ATIVAN) 0.5 MG tablet     Nutritional Supplements (ISOSOURCE 1.5 DIMA) LIQD     omeprazole (PRILOSEC) 2 mg/mL suspension     oxyCODONE (ROXICODONE) 5 MG tablet     prochlorperazine (COMPAZINE) 10 MG tablet     No current facility-administered medications for this visit.      Facility-Administered Medications Ordered in Other Visits   Medication     heparin 100 UNIT/ML injection 5 mL     No Known Allergies    Physical Exam:BP (!) 144/82 (BP Location: Right arm)   Pulse 95   Temp 97.8  F (36.6  C) (Oral)   Resp 18   Ht 1.651 m (5' 5\")   Wt 56.4 kg (124 lb 4.8 oz)   SpO2 95%   BMI 20.68 kg/m     ECOG PS- 0-1  Constitutional: Alert, thin, cooperative, and in no distress.   ENT: Eyes bright, no drooling  GI:  PEG site with no redness or discharge seen on video  RESP: No audible wheeze, cough, or visible cyanosis.  No visible retractions or increased work of breathing.    SKIN: Visible skin clear. No significant rash, abnormal pigmentation or lesions.  NEURO: Cranial nerves grossly intact.  Mentation and speech appropriate for age.  PSYCH: Mentation appears normal, affect normal/bright, judgement and insight intact, normal speech and appearance well-groomed.  The rest of a comprehensive physical examination is deferred due to PHE " (public health emergency) video visit restrictions     Laboratory Results:   Results for orders placed or performed in visit on 05/28/20   CBC with platelets differential     Status: Abnormal   Result Value Ref Range    WBC 7.8 4.0 - 11.0 10e9/L    RBC Count 4.23 (L) 4.4 - 5.9 10e12/L    Hemoglobin 11.8 (L) 13.3 - 17.7 g/dL    Hematocrit 37.5 (L) 40.0 - 53.0 %    MCV 89 78 - 100 fl    MCH 27.9 26.5 - 33.0 pg    MCHC 31.5 31.5 - 36.5 g/dL    RDW 22.9 (H) 10.0 - 15.0 %    Platelet Count 367 150 - 450 10e9/L    Diff Method Automated Method     % Neutrophils 75.8 %    % Lymphocytes 8.2 %    % Monocytes 14.2 %    % Eosinophils 1.0 %    % Basophils 0.5 %    % Immature Granulocytes 0.3 %    Absolute Neutrophil 5.9 1.6 - 8.3 10e9/L    Absolute Lymphocytes 0.6 (L) 0.8 - 5.3 10e9/L    Absolute Monocytes 1.1 0.0 - 1.3 10e9/L    Absolute Eosinophils 0.1 0.0 - 0.7 10e9/L    Absolute Basophils 0.0 0.0 - 0.2 10e9/L    Abs Immature Granulocytes 0.0 0 - 0.4 10e9/L   Basic metabolic panel     Status: Abnormal   Result Value Ref Range    Sodium 139 133 - 144 mmol/L    Potassium 4.3 3.4 - 5.3 mmol/L    Chloride 108 94 - 109 mmol/L    Carbon Dioxide 27 20 - 32 mmol/L    Anion Gap 4 3 - 14 mmol/L    Glucose 145 (H) 70 - 99 mg/dL    Urea Nitrogen 24 7 - 30 mg/dL    Creatinine 0.66 0.66 - 1.25 mg/dL    GFR Estimate >90 >60 mL/min/[1.73_m2]    GFR Estimate If Black >90 >60 mL/min/[1.73_m2]    Calcium 9.0 8.5 - 10.1 mg/dL   Magnesium     Status: Abnormal   Result Value Ref Range    Magnesium 2.4 (H) 1.6 - 2.3 mg/dL   Protein qualitative urine     Status: None   Result Value Ref Range    Protein Albumin Urine Negative NEG^Negative mg/dL       Assessment and Plan:    Pleural Mesothelioma- pt began treatment on 3/18/2020 with pemetrexed/carboplatin/bevacizumab. He appears to be tolerating this treatment with side effects of fatigue, early nausea and some regurgitation.   He verifies daily folic acid use and will receive B12 injection today.  He  will continue with cycle 4 today as planned imaging and review with Dr. Breen in June for review and further planning.  Weight loss-patient currently has PEG tube in place and has been working with a dietitian and reports family noticing some weight improvement this week.  Patient is not using any oral feeding and very limited oral fluids.  Highly suggest to continue with some oral fluids to maintain his swallowing ability though no drooling is noted at this time.   Nausea-patient was using the oral antiemetics and has a PEG tube treatment this is working well at this time for the first couple days after initiating cycle.  Regurgitation-  using prilosec solution daily - he has now been using for 1 month and appreciates the improvement to the stomach so we will continue with the medication- has been reviewed  Seizure disorder-no recent seizures but continues on Lamictal as previous  Based upon CDC guidance and to observe social distancing in the setting of the COVID-19 pandemic, the patient was seen virtually via video visit.   Location of pt- clinic room  Location of provider- office in clinic  Start of visit - 8:39 am  End of visit- 8:57 am  Total Time 18 min visit  Rajni Drummond,CNp

## 2020-05-28 NOTE — NURSING NOTE
SYMPTOM QUESTIONNAIRE    Pain: no    Nausea/Vomiting: no    Mouth Sores: no    Shortness of Breath: yes with increased activity outside    Smoking: no    Fever or Chills: no    Hard Stools: no    Soft Stools: no    Weight Loss: weight gain of 5 lbs since 5/4/2020- continuing supplements    Weakness: no    Burning, numbness or tingling in hands or feet: no    Problems with skin or swelling: no    Memory Loss: no    Anxiety or Depression: no    Trouble Sleeping: occasional    Ariadna Wallace LPN on 5/28/2020 at 8:28 AM

## 2020-05-28 NOTE — PROGRESS NOTES
Infusion Nursing Note:  Pradeep Figueroa JrFaye presents today for C4D1 PEMEtrexed/carbo/MVASI.    Patient seen by provider today: Yes: Rajni SEYMOUR CNP   present during visit today: Not Applicable.    Note: N/A.    Intravenous Access:  Implanted Port.    Treatment Conditions:  Lab Results   Component Value Date    HGB 11.8 05/28/2020     Lab Results   Component Value Date    WBC 7.8 05/28/2020      Lab Results   Component Value Date    ANEU 5.9 05/28/2020     Lab Results   Component Value Date     05/28/2020      Lab Results   Component Value Date     05/28/2020                   Lab Results   Component Value Date    POTASSIUM 4.3 05/28/2020           Lab Results   Component Value Date    MAG 2.4 05/28/2020            Lab Results   Component Value Date    CR 0.66 05/28/2020                   Lab Results   Component Value Date    DIMA 9.0 05/28/2020                Lab Results   Component Value Date    BILITOTAL 0.1 04/21/2020           Lab Results   Component Value Date    ALBUMIN 2.7 04/21/2020                    Lab Results   Component Value Date    ALT 31 04/21/2020           Lab Results   Component Value Date    AST 25 04/21/2020       Results reviewed, labs MET treatment parameters, ok to proceed with treatment.      Post Infusion Assessment:  Patient tolerated infusion without incident.  Blood return noted pre and post infusion.  No evidence of extravasations.  Access discontinued per protocol.       Discharge Plan:   Discharge instructions reviewed with: Patient.  Patient and/or family verbalized understanding of discharge instructions and all questions answered.  Patient discharged in stable condition accompanied by: self.  Departure Mode: Ambulatory.    Chika Carrera RN

## 2020-05-28 NOTE — LETTER
5/28/2020         RE: Pradeep Figueroa Jr.  [address] [place] [address]        Dear Colleague,    Thank you for referring your patient, Pradeep Figueroa Jr., to the [facility]. Please see a copy of my visit note below.    Oncology Follow Up Visit: May 28, 2020     Oncologist: Dr Myles Breen  PCP: Feliz Majano    Diagnosis: Pleural Mesothelioma  Pradeep Figueroa Jr.is a 73 yo male with history of epilepsy who presented to clinic in 7/2019 with progressive dysphagia. Difficult work up by GI with initial finding of achalasia. CT chest/abd/pelvis 2/18/20 showed significant circumferential mid-distal esophageal thickening with upstream dilation which was atypical for type II achalasia.  He was found to have an incidental moderate sized left sided pleural effusion   Along with some thickening in the parietal pleural lining and the right lower lobe and also thickening of the pleural lining in the mediastinum around the esophagus.Further endoscopy and EUS showed esophageal stricture with thickening 30 cm from incisors within mediastinum but outside esophageal wall. Biopsies of the specimen showed Loss of BAP1 protein expression that is  consistent   with malignant mesothelioma, epithelioid type based on this limited biopsy.    Treatment:   3/18/2020 began pemetrexed/carboplatin/bevacizumab    Interval History: Mr Figueroa is contacted today prior to continuation of treatment with pemetrexed/carboplatin/bevacizumab for his mesothelioma.  Patient shares he feels like he is doing well he is starting to gain weight and making some changes with his tube feedings initially trying a new formula getting back to his old formula tolerance and is now up 5 pounds.  Told me concerns today.  Patient is reporting that he does have some nausea shortly after treatment but he is using his antiemetics-he is not using any oral fluids at this time and very little fluids.  He does report which is very  "helpful for his stomach and would like to continue otherwise denies any pain pain mouth sores shortness of breath fevers change in bowel or bladder weakness or.  Does report that he has some occasional trouble with sleep  be surrounding his infusions.  No recent seizures.  Rest of comprehensive and complete ROS is reviewed and is negative.   Past Medical History:   Diagnosis Date     Mesothelioma (H)      Seizures (H)      Current Outpatient Medications   Medication     acetaminophen (TYLENOL) 325 MG tablet     albuterol (PROAIR HFA/PROVENTIL HFA/VENTOLIN HFA) 108 (90 Base) MCG/ACT inhaler     alendronate (FOSAMAX) 70 MG tablet     cephALEXin (KEFLEX) 500 MG capsule     cholecalciferol 25 MCG (1000 UT) TABS     cimetidine (TAGAMET HB) 200 MG tablet     dexamethasone (DECADRON) 4 MG tablet     folic acid (FOLVITE) 1 MG tablet     guaiFENesin-codeine (ROBITUSSIN AC) 100-10 MG/5ML solution     lamoTRIgine (LAMICTAL) 200 MG tablet     LORazepam (ATIVAN) 0.5 MG tablet     Nutritional Supplements (ISOSOURCE 1.5 DIMA) LIQD     omeprazole (PRILOSEC) 2 mg/mL suspension     oxyCODONE (ROXICODONE) 5 MG tablet     prochlorperazine (COMPAZINE) 10 MG tablet     No current facility-administered medications for this visit.      Facility-Administered Medications Ordered in Other Visits   Medication     heparin 100 UNIT/ML injection 5 mL     No Known Allergies    Physical Exam:BP (!) 144/82 (BP Location: Right arm)   Pulse 95   Temp 97.8  F (36.6  C) (Oral)   Resp 18   Ht 1.651 m (5' 5\")   Wt 56.4 kg (124 lb 4.8 oz)   SpO2 95%   BMI 20.68 kg/m     ECOG PS- 0-1  Constitutional: Alert, thin, cooperative, and in no distress.   ENT: Eyes bright, no drooling  GI:  PEG site with no redness or discharge seen on video  RESP: No audible wheeze, cough, or visible cyanosis.  No visible retractions or increased work of breathing.    SKIN: Visible skin clear. No significant rash, abnormal pigmentation or lesions.  NEURO: Cranial nerves " grossly intact.  Mentation and speech appropriate for age.  PSYCH: Mentation appears normal, affect normal/bright, judgement and insight intact, normal speech and appearance well-groomed.  The rest of a comprehensive physical examination is deferred due to PHE (public health emergency) video visit restrictions     Laboratory Results:   Results for orders placed or performed in visit on 05/28/20   CBC with platelets differential     Status: Abnormal   Result Value Ref Range    WBC 7.8 4.0 - 11.0 10e9/L    RBC Count 4.23 (L) 4.4 - 5.9 10e12/L    Hemoglobin 11.8 (L) 13.3 - 17.7 g/dL    Hematocrit 37.5 (L) 40.0 - 53.0 %    MCV 89 78 - 100 fl    MCH 27.9 26.5 - 33.0 pg    MCHC 31.5 31.5 - 36.5 g/dL    RDW 22.9 (H) 10.0 - 15.0 %    Platelet Count 367 150 - 450 10e9/L    Diff Method Automated Method     % Neutrophils 75.8 %    % Lymphocytes 8.2 %    % Monocytes 14.2 %    % Eosinophils 1.0 %    % Basophils 0.5 %    % Immature Granulocytes 0.3 %    Absolute Neutrophil 5.9 1.6 - 8.3 10e9/L    Absolute Lymphocytes 0.6 (L) 0.8 - 5.3 10e9/L    Absolute Monocytes 1.1 0.0 - 1.3 10e9/L    Absolute Eosinophils 0.1 0.0 - 0.7 10e9/L    Absolute Basophils 0.0 0.0 - 0.2 10e9/L    Abs Immature Granulocytes 0.0 0 - 0.4 10e9/L   Basic metabolic panel     Status: Abnormal   Result Value Ref Range    Sodium 139 133 - 144 mmol/L    Potassium 4.3 3.4 - 5.3 mmol/L    Chloride 108 94 - 109 mmol/L    Carbon Dioxide 27 20 - 32 mmol/L    Anion Gap 4 3 - 14 mmol/L    Glucose 145 (H) 70 - 99 mg/dL    Urea Nitrogen 24 7 - 30 mg/dL    Creatinine 0.66 0.66 - 1.25 mg/dL    GFR Estimate >90 >60 mL/min/[1.73_m2]    GFR Estimate If Black >90 >60 mL/min/[1.73_m2]    Calcium 9.0 8.5 - 10.1 mg/dL   Magnesium     Status: Abnormal   Result Value Ref Range    Magnesium 2.4 (H) 1.6 - 2.3 mg/dL   Protein qualitative urine     Status: None   Result Value Ref Range    Protein Albumin Urine Negative NEG^Negative mg/dL       Assessment and Plan:    Pleural  "Mesothelioma- pt began treatment on 3/18/2020 with pemetrexed/carboplatin/bevacizumab. He appears to be tolerating this treatment with side effects of fatigue, early nausea and some regurgitation.   He verifies daily folic acid use and will receive B12 injection today.  He will continue with cycle 4 today as planned imaging and review with Dr. Breen in June for review and further planning.  Weight loss-patient currently has PEG tube in place and has been working with a dietitian and reports family noticing some weight improvement this week.  Patient is not using any oral feeding and very limited oral fluids.  Highly suggest to continue with some oral fluids to maintain his swallowing ability though no drooling is noted at this time.   Nausea-patient was using the oral antiemetics and has a PEG tube treatment this is working well at this time for the first couple days after initiating cycle.  Regurgitation-  using prilosec solution daily - he has now been using for 1 month and appreciates the improvement to the stomach so we will continue with the medication- has been reviewed  Seizure disorder-no recent seizures but continues on Lamictal as previous  Based upon CDC guidance and to observe social distancing in the setting of the COVID-19 pandemic, the patient was seen virtually via video visit.   Location of pt- clinic room  Location of provider- office in clinic  Start of visit - 8:39 am  End of visit- 8:57 am  Total Time 18 min visit  Vance Mendiola Jr. is a 74 year old male who is being evaluated via a billable video visit.      The patient has been notified of following:     \"This video visit will be conducted via a call between you and your physician/provider. We have found that certain health care needs can be provided without the need for an in-person physical exam.  This service lets us provide the care you need with a video conversation.  If a prescription is necessary we " "can send it directly to your pharmacy.  If lab work is needed we can place an order for that and you can then stop by our lab to have the test done at a later time.    Video visits are billed at different rates depending on your insurance coverage.  Please reach out to your insurance provider with any questions.    If during the course of the call the physician/provider feels a video visit is not appropriate, you will not be charged for this service.\"    Patient has given verbal consent for Video visit? Yes    How would you like to [facility] your [facility]    Patient would like the video invitation sent by: Text to cell phone: [phone]    Will anyone else be joining your video visit? No        Video-Visit Details    See provider visit for details          Again, thank you for allowing me to participate in the care of your patient.        Sincerely,        Rajni Drummond NP, APRN CNP    "

## 2020-05-28 NOTE — PROGRESS NOTES
Spoke with Nataliia who had some questions about Yossi's schedule and treatment questions.  She was wondering if Yossi needed another treatment scheduled after 5/28.  She was also questioning what the next step will be after tomorrows treatment.  Let her know that per the last note from  on 4/29 that he will continue with the   pemetrexed/carboplatin/bevacizumab every 3 weeks for 2 more cycles and then repeat scan after 4 cycles. If no further improvement in disease, we will consdier switching to immunotherapy (nivo_ipi) vs Keytruda. We will also consider palliative XRT to esophagus.  Let her know per the notes, this will be his 4th treatment and he should be done.  Also let her know that  will be notified as to next plans and she will be notified.  She agreed with and verbalized understanding of the plan of care.

## 2020-06-04 ENCOUNTER — HOME INFUSION (PRE-WILLOW HOME INFUSION) (OUTPATIENT)
Dept: PHARMACY | Facility: CLINIC | Age: 75
End: 2020-06-04

## 2020-06-05 NOTE — PROGRESS NOTES
This is a recent snapshot of the patient's [facility] medical record.  For current drug dose and complete information and questions, call [phone] or In Basket pool, [facility] home infusion ([id number])  CSN Number:  [id number]

## 2020-06-09 ENCOUNTER — HOME INFUSION (PRE-WILLOW HOME INFUSION) (OUTPATIENT)
Dept: PHARMACY | Facility: CLINIC | Age: 75
End: 2020-06-09

## 2020-06-10 NOTE — PROGRESS NOTES
This is a recent snapshot of the patient's [facility] medical record.  For current drug dose and complete information and questions, call [phone] or In Basket pool, fv home infusion ([id number])  CSN Number:  [id number]

## 2020-06-16 ENCOUNTER — ONCOLOGY VISIT (OUTPATIENT)
Dept: ONCOLOGY | Facility: CLINIC | Age: 75
End: 2020-06-16
Payer: MEDICARE

## 2020-06-16 ENCOUNTER — ANCILLARY PROCEDURE (OUTPATIENT)
Dept: CT IMAGING | Facility: CLINIC | Age: 75
End: 2020-06-16
Payer: MEDICARE

## 2020-06-16 DIAGNOSIS — C45.0 MESOTHELIOMA (PLEURAL) (H): ICD-10-CM

## 2020-06-16 LAB
ALBUMIN SERPL-MCNC: 3.4 G/DL (ref 3.4–5)
ALP SERPL-CCNC: 70 U/L (ref 40–150)
ALT SERPL W P-5'-P-CCNC: 29 U/L (ref 0–70)
ANION GAP SERPL CALCULATED.3IONS-SCNC: 3 MMOL/L (ref 3–14)
ANISOCYTOSIS BLD QL SMEAR: ABNORMAL
AST SERPL W P-5'-P-CCNC: 21 U/L (ref 0–45)
BILIRUB SERPL-MCNC: 0.2 MG/DL (ref 0.2–1.3)
BUN SERPL-MCNC: 24 MG/DL (ref 7–30)
CALCIUM SERPL-MCNC: 9.3 MG/DL (ref 8.5–10.1)
CHLORIDE SERPL-SCNC: 107 MMOL/L (ref 94–109)
CO2 SERPL-SCNC: 28 MMOL/L (ref 20–32)
CREAT SERPL-MCNC: 0.76 MG/DL (ref 0.66–1.25)
DIFFERENTIAL METHOD BLD: ABNORMAL
ELLIPTOCYTES BLD QL SMEAR: SLIGHT
ERYTHROCYTE [DISTWIDTH] IN BLOOD BY AUTOMATED COUNT: 23.1 % (ref 10–15)
GFR SERPL CREATININE-BSD FRML MDRD: 88 ML/MIN/{1.73_M2}
GLUCOSE SERPL-MCNC: 108 MG/DL (ref 70–99)
HCT VFR BLD AUTO: 35 % (ref 40–53)
HGB BLD-MCNC: 11.2 G/DL (ref 13.3–17.7)
LYMPHOCYTES # BLD AUTO: 1.2 10E9/L (ref 0.8–5.3)
LYMPHOCYTES NFR BLD AUTO: 32 %
MCH RBC QN AUTO: 28.9 PG (ref 26.5–33)
MCHC RBC AUTO-ENTMCNC: 32 G/DL (ref 31.5–36.5)
MCV RBC AUTO: 90 FL (ref 78–100)
MONOCYTES # BLD AUTO: 1 10E9/L (ref 0–1.3)
MONOCYTES NFR BLD AUTO: 28 %
NEUTROPHILS # BLD AUTO: 1.4 10E9/L (ref 1.6–8.3)
NEUTROPHILS NFR BLD AUTO: 40 %
PLATELET # BLD AUTO: 171 10E9/L (ref 150–450)
PLATELET # BLD EST: ABNORMAL 10*3/UL
POTASSIUM SERPL-SCNC: 4.5 MMOL/L (ref 3.4–5.3)
PROT SERPL-MCNC: 7.4 G/DL (ref 6.8–8.8)
RBC # BLD AUTO: 3.88 10E12/L (ref 4.4–5.9)
RBC INCLUSIONS BLD: SLIGHT
SODIUM SERPL-SCNC: 138 MMOL/L (ref 133–144)
WBC # BLD AUTO: 3.6 10E9/L (ref 4–11)

## 2020-06-16 PROCEDURE — 71260 CT THORAX DX C+: CPT | Performed by: RADIOLOGY

## 2020-06-16 PROCEDURE — 80053 COMPREHEN METABOLIC PANEL: CPT | Performed by: INTERNAL MEDICINE

## 2020-06-16 PROCEDURE — 85025 COMPLETE CBC W/AUTO DIFF WBC: CPT | Performed by: INTERNAL MEDICINE

## 2020-06-16 PROCEDURE — 99207 ZZC NO CHARGE NURSE ONLY: CPT

## 2020-06-16 RX ORDER — HEPARIN SODIUM (PORCINE) LOCK FLUSH IV SOLN 100 UNIT/ML 100 UNIT/ML
5 SOLUTION INTRAVENOUS
Status: DISCONTINUED | OUTPATIENT
Start: 2020-06-16 | End: 2020-06-16 | Stop reason: HOSPADM

## 2020-06-16 RX ORDER — HEPARIN SODIUM (PORCINE) LOCK FLUSH IV SOLN 100 UNIT/ML 100 UNIT/ML
5 SOLUTION INTRAVENOUS ONCE
Status: COMPLETED | OUTPATIENT
Start: 2020-06-16 | End: 2020-06-16

## 2020-06-16 RX ORDER — IOPAMIDOL 755 MG/ML
61 INJECTION, SOLUTION INTRAVASCULAR ONCE
Status: COMPLETED | OUTPATIENT
Start: 2020-06-16 | End: 2020-06-16

## 2020-06-16 RX ADMIN — HEPARIN SODIUM (PORCINE) LOCK FLUSH IV SOLN 100 UNIT/ML 5 ML: 100 SOLUTION at 13:53

## 2020-06-16 RX ADMIN — IOPAMIDOL 61 ML: 755 INJECTION, SOLUTION INTRAVASCULAR at 13:31

## 2020-06-16 RX ADMIN — HEPARIN SODIUM (PORCINE) LOCK FLUSH IV SOLN 100 UNIT/ML 5 ML: 100 SOLUTION at 13:00

## 2020-06-16 NOTE — PROGRESS NOTES
Port needle left accessed for CT scan. Tolerated port access and draw without complaint. Port site scrubbed with Chloraprep for 30 seconds and allowed to dry completely prior to dressing application. Accessed using sterile technique. Boling tubes drawn-Red gel/Green/Purple tubes. Double signed by patient and RN. See documentation flowsheet. Ericka Hewitt, RN, BSN, OCN

## 2020-06-17 ENCOUNTER — VIRTUAL VISIT (OUTPATIENT)
Dept: ONCOLOGY | Facility: CLINIC | Age: 75
End: 2020-06-17
Attending: INTERNAL MEDICINE
Payer: MEDICARE

## 2020-06-17 VITALS — BODY MASS INDEX: 19.97 KG/M2 | WEIGHT: 120 LBS

## 2020-06-17 DIAGNOSIS — C45.0 MESOTHELIOMA (PLEURAL) (H): Primary | ICD-10-CM

## 2020-06-17 PROCEDURE — 40001009 ZZH VIDEO/TELEPHONE VISIT; NO CHARGE

## 2020-06-17 PROCEDURE — 99215 OFFICE O/P EST HI 40 MIN: CPT | Mod: 95 | Performed by: INTERNAL MEDICINE

## 2020-06-17 ASSESSMENT — PAIN SCALES - GENERAL: PAINLEVEL: NO PAIN (0)

## 2020-06-17 NOTE — LETTER
"    6/17/2020         RE: Pradeep Figueroa Jr.  [address] [place] [address]        Dear Colleague,    Thank you for referring your patient, Pradeep Figueroa Jr., to the [facility]. Please see a copy of my visit note below.    Pradeep Figueroa Jr. is a 75 year old male who is being evaluated via a billable video visit.      Video-Visit Details    Type of service:  Video Visit    Video Start Time: 11:43 AM  Video End Time: 12:30 PM    Originating Location (pt. Location): Home    Distant Location (provider location):  [facility]     Platform used for Video Visit: Archie Infante MD        Pradeep Figueroa Jr. is a 74 year old male who is being evaluated via a billable video visit.      The patient has been notified of following:     \"This video visit will be conducted via a call between you and your physician/provider. We have found that certain health care needs can be provided without the need for an in-person physical exam.  This service lets us provide the care you need with a video conversation.  If a prescription is necessary we can send it directly to your pharmacy.  If lab work is needed we can place an order for that and you can then stop by our lab to have the test done at a later time.    Video visits are billed at different rates depending on your insurance coverage.  Please reach out to your insurance provider with any questions.    If during the course of the call the physician/provider feels a video visit is not appropriate, you will not be charged for this service.\"    Patient has given verbal consent for Video visit? Yes    How would you like to obtain your AVS? Jenaro    Patient would like the video invitation sent by: Text to cell phone: [phone]    Will anyone else be joining your video visit? Yes: Edna. How would they like to receive their invitation? Text to cell phone: [phone], Nataliia - text to cell phone: " [phone].    I have reviewed and updated the patient's allergies and medication list.    Concerns: No new concerns.   Refills: None needed.      Pricilla Jimenez, [facility]        Oncology Follow Up Visit: June 17, 2020    Oncologist: Dr Myles Breen  PCP: Feliz Majano    Diagnosis: Pleural Mesothelioma  Pradeep Figueroa Jr.is a 75 yo male with history of epilepsy who presented to clinic in 7/2019  With progressive dysphagia. Work up by GI difficult work up with initial finding of achalasia. CT chest/abd/pelvis 2/18/20 showed significant circumferential mid-distal esophageal thickening with upstream dilation which was atypical for type II achalasia.  He was found to have an incidental moderate sized left sided pleural effusion   Along with some thickening in the parietal pleural lining and the right lower lobe and also thickening of the pleural lining in the mediastinum around the esophagus.Further endoscopy and EUS showed esophageal stricture with thickening 30 cm from incisors within mediastinum but outside esophageal wall. Biopsies of the specimen showed Loss of BAP1 protein expression that is  consistent   with malignant mesothelioma, epithelioid type based on this limited biopsy.    Underwent PEG tube palcement 3/30/20 for severe nausea, vomiting, due to mesothelioma involvement of esophagus.  Treatment:   3/18/2020 to 5/28/2020- 4 cycles of pemetrexed/carboplatin/bevacizumab.  CT scan after 2 cycles with overall stable disease.    Interval History: Mr Figueroa was on vide call with wife prior to discuss CT scan results following 4 cycles of chemotherapy  pemetrexed/carboplatin/bevacizumab to treat his mesothelioma.     He tolerated the 4 cycles of chemotherapy fairly well.  He had mild fatigue the first 3 to 4 days following chemotherapy which improved later.  He had mild nausea and diarrhea.  He continues to have only mild exertional shortness of breath that is stable.  He feels that his stamina is decreased a  little bit.    He has been using PEG tube for his feedings.  He is using almost 6 cartons per day.  He has actually gained about 5 pounds in the last 6 weeks.  No issues issues with cellulitis around the PEG tube site.  No more vomiting.  He is only able to swallow small amount of thin liquids now.  He has mild reflux symptoms with discomfort in the middle of the chest especially at night. He denies pain today, nausea, SOB fevers, weakness, or neuropathy.        Rest of comprehensive and complete ROS is reviewed and is negative.     Past Medical History:   Diagnosis Date     Mesothelioma (H)      Seizures (H)      Current Outpatient Medications   Medication     acetaminophen (TYLENOL) 325 MG tablet     albuterol (PROAIR HFA/PROVENTIL HFA/VENTOLIN HFA) 108 (90 Base) MCG/ACT inhaler     alendronate (FOSAMAX) 70 MG tablet     cholecalciferol 25 MCG (1000 UT) TABS     cimetidine (TAGAMET HB) 200 MG tablet     dexamethasone (DECADRON) 4 MG tablet     folic acid (FOLVITE) 1 MG tablet     guaiFENesin-codeine (ROBITUSSIN AC) 100-10 MG/5ML solution     lamoTRIgine (LAMICTAL) 200 MG tablet     LORazepam (ATIVAN) 0.5 MG tablet     Nutritional Supplements (ISOSOURCE 1.5 DIMA) LIQD     omeprazole (PRILOSEC) 2 mg/mL suspension     oxyCODONE (ROXICODONE) 5 MG tablet     prochlorperazine (COMPAZINE) 10 MG tablet     No current facility-administered medications for this visit.      Facility-Administered Medications Ordered in Other Visits   Medication     heparin 100 UNIT/ML injection 5 mL     No Known Allergies    Laboratory Results:   No results found for any visits on 06/17/20.     EXAM:  CT CHEST W CONTRAST . 6/16/2020 1:40 PM      TECHNIQUE:  Helical CT images from the thoracic inlet through the  upper abdomen were obtained with intravenous contrast. Contrast dose:  61 ml isovue 370     COMPARISON: 4/21/2020, 3/10/2020     HISTORY:   Mesothelioma (pleural) (H)      FINDINGS:  CHEST:  LUNGS: The central tracheobronchial tree is  patent. Increased size of  the large left pleural effusion with associated increased atelectasis  of the left lower lobe and lingula. Trace right pleural effusion. No  pneumothorax. No suspicious pulmonary nodule. No significant change in  the pleural-based soft tissue lesion along the posterior left upper  lobe/paraspinous region adjacent to the T4 vertebral body measuring  approximately 4.3 x 1.6 cm (series 9 image 31). There is soft tissue  enhancement adjacent to the left T4-5 neural foramen suggestive of  soft tissue mass extending into this level. There are additional less  conspicuous enhancing left pleural-based plaques.     MEDIASTINUM: Right IJ central venous catheter tip terminates in the  superior cavoatrial junction. The visualized thyroid is unremarkable.  Three-vessel branching pattern. The ascending aorta and main pulmonary  artery are normal in caliber. No central pulmonary embolus. The heart  is not enlarged. Calcified left hilar lymph nodes. No new mediastinal  axillary or hilar lymphadenopathy. Circumferential  wall thickening of  the distal esophagus similar to comparison exam and comparison PET CT  at which time this region demonstrated FDG avidity. The upstream  esophagus is significantly dilated.     UPPER ABDOMEN: Multiple hypoattenuating cysts throughout the liver.  Bilateral parapelvic cysts.     BONES/SOFT TISSUES: Mild sclerotic change of the posterior left fourth  rib, likely secondary to invasion from the adjacent previously seen  pleural-based mass                                                                       IMPRESSION:   1. No significant change in the appearance of the plaque-like  enhancing pleural-based mass along the posterior left upper  lobe/paraspinous region with local invasion into the left T4-5 neural  foramen and sclerotic change of the posterior left fourth rib  suggestive of disease involvement. Additional less conspicuous  pleural-based plaques are better  delineated on comparison PET CT  3/10/2020.  2. No significant change in the circumferential distal esophageal wall  thickening (demonstrated FDG avidity on comparison PET CT) and  upstream esophageal dilation.  3. Increased size of the large left pleural effusion. Stable trace  right pleural effusion.     I have personally reviewed the examination and initial interpretation  and I agree with the findings.     MALOU ARMENTA MD    Assessment and Plan:       ECOG PS 1  Unresectable Pleural Mesothelioma with esophgeal involvement- pt began treatment on 3/18/2020 with pemetrexed/carboplatin/bevacizumab and noted some nausea and regurgitation but recuperated and felt fine rest of cycle. CT scan after 2 cycles with stable disease.  He has now finished 4 cycles.  CT scan after 4 cycles showing overall stable disease but with minimal response.  He also has some increasing left-sided pleural effusion.  However from his symptoms perspective, is fairly stable and feels that he is about the same.  No recent worsening in shortness of breath and does not seem to be affected a whole lot by the pleural effusion.    We discussed the results of the CT scan with Yossi and his wife.  We discussed that further response with chemotherapy is unlikely.  Given that he is not had any response on the chemotherapy, we discussed about starting him on immunotherapy.  We discussed his options including nivolumab and ipilimumab or pembrolizumab.  We will first try for insurance approval of nivolumab and ipilimumab.  We discussed the side effects of this combination immunotherapy regimen including immune related side effects, some of them are potentially life-threatening and may require steroids.  We discussed that nivolumab will be every 2 weeks and ipilimumab will be every 6 weeks.  If there are problems with insurance approval at nivolumab and ipilimumab, we will try for pembrolizumab.    The patient is in agreement with the plan    Plan  --In 1  week Arrange for Nivolumab and ipilimumab infusion at [place]   --Appt with [facility] at [place] in week 4 after 1st infusion of Nivoluma  --In 8 weeks after first dose of nivolumab, appt with Dr. Breen with prior CT scan     .   #Nausea, Regurgitation- improved  S/p PEG tube- . Pt has had large weight loss with start of the health concerns and has limited oral feeding due to esophageal narrowing. He is now being followed by home infusion dietition and is using 6 cartons daily of formula with rinses and small amount of oral fluids. He has gained 5 lbs in 6 weeks which is good.   At some point we might also consider XRT to esophagus if swallowing does not improve.    Constipation- resolved. may have been from few pain medications taken after PEG placement but is now aware of need for Miralax and can add more fluids per PEG if needed. Reminded of exercise being helpful for bowels as well.     Staffed with Dr. Breen.    Roosevelt Infante MD  Hematology Oncology fellow  [phone]    I directly participated in this video appointment.  Disease is stable.  We will change to immunotherapy with ipi/nivo at this time.  Side effects discussed with the patient and his partner.          Again, thank you for allowing me to participate in the care of your patient.        Sincerely,        Myles Breen MD

## 2020-06-22 RX ORDER — DIPHENHYDRAMINE HYDROCHLORIDE 50 MG/ML
50 INJECTION INTRAMUSCULAR; INTRAVENOUS
Status: CANCELLED
Start: 2020-06-24

## 2020-06-22 RX ORDER — SODIUM CHLORIDE 9 MG/ML
1000 INJECTION, SOLUTION INTRAVENOUS CONTINUOUS PRN
Status: CANCELLED
Start: 2020-06-24

## 2020-06-22 RX ORDER — MEPERIDINE HYDROCHLORIDE 25 MG/ML
25 INJECTION INTRAMUSCULAR; INTRAVENOUS; SUBCUTANEOUS EVERY 30 MIN PRN
Status: CANCELLED | OUTPATIENT
Start: 2020-06-24

## 2020-06-22 RX ORDER — SODIUM CHLORIDE 9 MG/ML
1000 INJECTION, SOLUTION INTRAVENOUS CONTINUOUS PRN
Status: CANCELLED
Start: 2020-07-08

## 2020-06-22 RX ORDER — HEPARIN SODIUM,PORCINE 10 UNIT/ML
5 VIAL (ML) INTRAVENOUS
Status: CANCELLED | OUTPATIENT
Start: 2020-06-24

## 2020-06-22 RX ORDER — DIPHENHYDRAMINE HYDROCHLORIDE 50 MG/ML
50 INJECTION INTRAMUSCULAR; INTRAVENOUS
Status: CANCELLED
Start: 2020-07-08

## 2020-06-22 RX ORDER — HEPARIN SODIUM (PORCINE) LOCK FLUSH IV SOLN 100 UNIT/ML 100 UNIT/ML
5 SOLUTION INTRAVENOUS
Status: CANCELLED | OUTPATIENT
Start: 2020-06-24

## 2020-06-22 RX ORDER — HEPARIN SODIUM,PORCINE 10 UNIT/ML
5 VIAL (ML) INTRAVENOUS
Status: CANCELLED | OUTPATIENT
Start: 2020-07-08

## 2020-06-22 RX ORDER — MEPERIDINE HYDROCHLORIDE 25 MG/ML
25 INJECTION INTRAMUSCULAR; INTRAVENOUS; SUBCUTANEOUS EVERY 30 MIN PRN
Status: CANCELLED | OUTPATIENT
Start: 2020-07-08

## 2020-06-22 RX ORDER — NALOXONE HYDROCHLORIDE 0.4 MG/ML
.1-.4 INJECTION, SOLUTION INTRAMUSCULAR; INTRAVENOUS; SUBCUTANEOUS
Status: CANCELLED | OUTPATIENT
Start: 2020-06-24

## 2020-06-22 RX ORDER — ALBUTEROL SULFATE 0.83 MG/ML
2.5 SOLUTION RESPIRATORY (INHALATION)
Status: CANCELLED | OUTPATIENT
Start: 2020-06-24

## 2020-06-22 RX ORDER — ALBUTEROL SULFATE 90 UG/1
1-2 AEROSOL, METERED RESPIRATORY (INHALATION)
Status: CANCELLED
Start: 2020-06-24

## 2020-06-22 RX ORDER — ALBUTEROL SULFATE 0.83 MG/ML
2.5 SOLUTION RESPIRATORY (INHALATION)
Status: CANCELLED | OUTPATIENT
Start: 2020-07-08

## 2020-06-22 RX ORDER — NALOXONE HYDROCHLORIDE 0.4 MG/ML
.1-.4 INJECTION, SOLUTION INTRAMUSCULAR; INTRAVENOUS; SUBCUTANEOUS
Status: CANCELLED | OUTPATIENT
Start: 2020-07-08

## 2020-06-22 RX ORDER — EPINEPHRINE 1 MG/ML
0.3 INJECTION, SOLUTION INTRAMUSCULAR; SUBCUTANEOUS EVERY 5 MIN PRN
Status: CANCELLED | OUTPATIENT
Start: 2020-07-08

## 2020-06-22 RX ORDER — LORAZEPAM 2 MG/ML
0.5 INJECTION INTRAMUSCULAR EVERY 4 HOURS PRN
Status: CANCELLED
Start: 2020-07-08

## 2020-06-22 RX ORDER — METHYLPREDNISOLONE SODIUM SUCCINATE 125 MG/2ML
125 INJECTION, POWDER, LYOPHILIZED, FOR SOLUTION INTRAMUSCULAR; INTRAVENOUS
Status: CANCELLED
Start: 2020-07-08

## 2020-06-22 RX ORDER — HEPARIN SODIUM (PORCINE) LOCK FLUSH IV SOLN 100 UNIT/ML 100 UNIT/ML
5 SOLUTION INTRAVENOUS
Status: CANCELLED | OUTPATIENT
Start: 2020-07-08

## 2020-06-22 RX ORDER — EPINEPHRINE 1 MG/ML
0.3 INJECTION, SOLUTION INTRAMUSCULAR; SUBCUTANEOUS EVERY 5 MIN PRN
Status: CANCELLED | OUTPATIENT
Start: 2020-06-24

## 2020-06-22 RX ORDER — ALBUTEROL SULFATE 90 UG/1
1-2 AEROSOL, METERED RESPIRATORY (INHALATION)
Status: CANCELLED
Start: 2020-07-08

## 2020-06-22 RX ORDER — METHYLPREDNISOLONE SODIUM SUCCINATE 125 MG/2ML
125 INJECTION, POWDER, LYOPHILIZED, FOR SOLUTION INTRAMUSCULAR; INTRAVENOUS
Status: CANCELLED
Start: 2020-06-24

## 2020-06-22 RX ORDER — LORAZEPAM 2 MG/ML
0.5 INJECTION INTRAMUSCULAR EVERY 4 HOURS PRN
Status: CANCELLED
Start: 2020-06-24

## 2020-06-23 ENCOUNTER — TELEPHONE (OUTPATIENT)
Dept: ONCOLOGY | Facility: CLINIC | Age: 75
End: 2020-06-23

## 2020-06-23 RX ORDER — LORAZEPAM 0.5 MG/1
0.5 TABLET ORAL EVERY 4 HOURS PRN
Qty: 30 TABLET | Refills: 3 | Status: SHIPPED | OUTPATIENT
Start: 2020-06-23

## 2020-06-23 RX ORDER — PROCHLORPERAZINE MALEATE 10 MG
10 TABLET ORAL EVERY 6 HOURS PRN
Qty: 30 TABLET | Refills: 3 | Status: SHIPPED | OUTPATIENT
Start: 2020-06-23

## 2020-06-23 NOTE — TELEPHONE ENCOUNTER
Prior Authorization Approval    Authorization Effective Date: 6/23/2020  Authorization Expiration Date: 6/23/2021  Medication: Lorazepam PA Approved  Approved Dose/Quantity: 0.5mg 30/5  Reference #: [id number]   Insurance Company: [employer] ([facility]) - Phone [phone] Fax [phone]  Expected CoPay: 2.91     CoPay Card Available:      Foundation Assistance Needed:    Which Pharmacy is filling the prescription (Not needed for infusion/clinic administered): [facility] [address]  Pharmacy Notified: Yes  Patient Notified: Yes          Prerna Monaco CPhT  [facility]  Oncology Pharmacy Liaison   [email or web address]  [phone] (phone  [phone] (fax

## 2020-06-24 ENCOUNTER — INFUSION THERAPY VISIT (OUTPATIENT)
Dept: ONCOLOGY | Facility: CLINIC | Age: 75
End: 2020-06-24
Attending: INTERNAL MEDICINE
Payer: MEDICARE

## 2020-06-24 ENCOUNTER — APPOINTMENT (OUTPATIENT)
Dept: LAB | Facility: CLINIC | Age: 75
End: 2020-06-24
Attending: INTERNAL MEDICINE
Payer: MEDICARE

## 2020-06-24 VITALS
BODY MASS INDEX: 21.28 KG/M2 | TEMPERATURE: 97.9 F | SYSTOLIC BLOOD PRESSURE: 150 MMHG | DIASTOLIC BLOOD PRESSURE: 90 MMHG | RESPIRATION RATE: 18 BRPM | OXYGEN SATURATION: 96 % | HEART RATE: 97 BPM | WEIGHT: 127.9 LBS

## 2020-06-24 DIAGNOSIS — C45.0 MESOTHELIOMA (PLEURAL) (H): Primary | ICD-10-CM

## 2020-06-24 LAB
ALBUMIN SERPL-MCNC: 3.3 G/DL (ref 3.4–5)
ALP SERPL-CCNC: 63 U/L (ref 40–150)
ALT SERPL W P-5'-P-CCNC: 24 U/L (ref 0–70)
ANION GAP SERPL CALCULATED.3IONS-SCNC: 4 MMOL/L (ref 3–14)
AST SERPL W P-5'-P-CCNC: 23 U/L (ref 0–45)
BASOPHILS # BLD AUTO: 0 10E9/L (ref 0–0.2)
BASOPHILS NFR BLD AUTO: 0.7 %
BILIRUB SERPL-MCNC: 0.2 MG/DL (ref 0.2–1.3)
BUN SERPL-MCNC: 22 MG/DL (ref 7–30)
CALCIUM SERPL-MCNC: 9 MG/DL (ref 8.5–10.1)
CHLORIDE SERPL-SCNC: 105 MMOL/L (ref 94–109)
CO2 SERPL-SCNC: 27 MMOL/L (ref 20–32)
CREAT SERPL-MCNC: 0.7 MG/DL (ref 0.66–1.25)
DIFFERENTIAL METHOD BLD: ABNORMAL
EOSINOPHIL # BLD AUTO: 0.1 10E9/L (ref 0–0.7)
EOSINOPHIL NFR BLD AUTO: 1.4 %
ERYTHROCYTE [DISTWIDTH] IN BLOOD BY AUTOMATED COUNT: 22.9 % (ref 10–15)
GFR SERPL CREATININE-BSD FRML MDRD: >90 ML/MIN/{1.73_M2}
GLUCOSE SERPL-MCNC: 107 MG/DL (ref 70–99)
HCT VFR BLD AUTO: 37.8 % (ref 40–53)
HGB BLD-MCNC: 12.1 G/DL (ref 13.3–17.7)
IMM GRANULOCYTES # BLD: 0 10E9/L (ref 0–0.4)
IMM GRANULOCYTES NFR BLD: 0.5 %
LYMPHOCYTES # BLD AUTO: 1.1 10E9/L (ref 0.8–5.3)
LYMPHOCYTES NFR BLD AUTO: 19.1 %
MCH RBC QN AUTO: 29.4 PG (ref 26.5–33)
MCHC RBC AUTO-ENTMCNC: 32 G/DL (ref 31.5–36.5)
MCV RBC AUTO: 92 FL (ref 78–100)
MONOCYTES # BLD AUTO: 1.1 10E9/L (ref 0–1.3)
MONOCYTES NFR BLD AUTO: 18.9 %
NEUTROPHILS # BLD AUTO: 3.4 10E9/L (ref 1.6–8.3)
NEUTROPHILS NFR BLD AUTO: 59.4 %
NRBC # BLD AUTO: 0 10*3/UL
NRBC BLD AUTO-RTO: 0 /100
PLATELET # BLD AUTO: 327 10E9/L (ref 150–450)
POTASSIUM SERPL-SCNC: 4.5 MMOL/L (ref 3.4–5.3)
PROT SERPL-MCNC: 7.5 G/DL (ref 6.8–8.8)
RBC # BLD AUTO: 4.12 10E12/L (ref 4.4–5.9)
SODIUM SERPL-SCNC: 136 MMOL/L (ref 133–144)
TSH SERPL DL<=0.005 MIU/L-ACNC: 2.29 MU/L (ref 0.4–4)
WBC # BLD AUTO: 5.7 10E9/L (ref 4–11)

## 2020-06-24 PROCEDURE — 80053 COMPREHEN METABOLIC PANEL: CPT | Performed by: INTERNAL MEDICINE

## 2020-06-24 PROCEDURE — 25000128 H RX IP 250 OP 636: Mod: ZF | Performed by: INTERNAL MEDICINE

## 2020-06-24 PROCEDURE — 96417 CHEMO IV INFUS EACH ADDL SEQ: CPT

## 2020-06-24 PROCEDURE — 96413 CHEMO IV INFUSION 1 HR: CPT

## 2020-06-24 PROCEDURE — 85025 COMPLETE CBC W/AUTO DIFF WBC: CPT | Performed by: INTERNAL MEDICINE

## 2020-06-24 PROCEDURE — 84443 ASSAY THYROID STIM HORMONE: CPT | Performed by: INTERNAL MEDICINE

## 2020-06-24 PROCEDURE — 25800030 ZZH RX IP 258 OP 636: Mod: ZF | Performed by: INTERNAL MEDICINE

## 2020-06-24 RX ORDER — HEPARIN SODIUM (PORCINE) LOCK FLUSH IV SOLN 100 UNIT/ML 100 UNIT/ML
5 SOLUTION INTRAVENOUS ONCE
Status: COMPLETED | OUTPATIENT
Start: 2020-06-24 | End: 2020-06-24

## 2020-06-24 RX ORDER — HEPARIN SODIUM (PORCINE) LOCK FLUSH IV SOLN 100 UNIT/ML 100 UNIT/ML
5 SOLUTION INTRAVENOUS
Status: DISCONTINUED | OUTPATIENT
Start: 2020-06-24 | End: 2020-06-24 | Stop reason: HOSPADM

## 2020-06-24 RX ADMIN — SODIUM CHLORIDE 163 MG: 9 INJECTION, SOLUTION INTRAVENOUS at 14:32

## 2020-06-24 RX ADMIN — SODIUM CHLORIDE 54 MG: 9 INJECTION, SOLUTION INTRAVENOUS at 15:12

## 2020-06-24 RX ADMIN — Medication 5 ML: at 12:55

## 2020-06-24 RX ADMIN — SODIUM CHLORIDE 250 ML: 9 INJECTION, SOLUTION INTRAVENOUS at 13:55

## 2020-06-24 ASSESSMENT — PAIN SCALES - GENERAL: PAINLEVEL: NO PAIN (0)

## 2020-06-24 NOTE — PROGRESS NOTES
Infusion Nursing Note:  Pradeep Figueroa  presents today for Cycle 1 Day 1 Nivolumab/Yervoy.    Patient seen by provider today: No   present during visit today: Not Applicable.    Note: New to our infusion room.  Has received chemotherapy at [facility].  Is starting a new regiment today of Nivolumab/Yervoy.  He has talked to MD about this treatment and it's potential side effects.  I have provided printed drug information off [employer] website for him.    Intravenous Access:  Implanted Port.    Treatment Conditions:  Lab Results   Component Value Date    HGB 12.1 06/24/2020     Lab Results   Component Value Date    WBC 5.7 06/24/2020      Lab Results   Component Value Date    ANEU 3.4 06/24/2020     Lab Results   Component Value Date     06/24/2020      Lab Results   Component Value Date     06/24/2020                   Lab Results   Component Value Date    POTASSIUM 4.5 06/24/2020           Lab Results   Component Value Date    MAG 2.4 05/28/2020            Lab Results   Component Value Date    CR 0.70 06/24/2020                   Lab Results   Component Value Date    DIMA 9.0 06/24/2020                Lab Results   Component Value Date    BILITOTAL 0.2 06/24/2020           Lab Results   Component Value Date    ALBUMIN 3.3 06/24/2020                    Lab Results   Component Value Date    ALT 24 06/24/2020           Lab Results   Component Value Date    AST 23 06/24/2020       Results reviewed, labs MET treatment parameters, ok to proceed with treatment.      Post Infusion Assessment:  Patient tolerated infusion without incident.  Blood return noted pre and post infusion.  Site patent and intact, free from redness, edema or discomfort.  No evidence of extravasations.  Access discontinued per protocol.       Discharge Plan:   Patient declined prescription refills.  I have asked our pharmacy to help transfer his ativan and compazine to his local [facility].  He has plenty of  ativan/compazine for now.  Copy of AVS reviewed with patient and/or family.  Patient will return 7/8/2020 labs/infusion at [facility] for next appointment.  Patient discharged in stable condition accompanied by: self.  Departure Mode: Ambulatory.  Face to Face time: 0.    Rajni Acosta RN

## 2020-06-24 NOTE — NURSING NOTE
Chief Complaint   Patient presents with     Port Draw     labs drawn via port by RN in lab     BP (!) 150/90 (BP Location: Left arm, Patient Position: Chair, Cuff Size: Adult Regular)   Pulse 97   Temp 97.9  F (36.6  C) (Oral)   Resp 18   Wt 58 kg (127 lb 14.4 oz)   SpO2 96%   BMI 21.28 kg/m      Port accessed by RN in lab. Labs collected and sent. Pt tolerated well. Line flushed with Normal Saline & Heparin.   Pt checked in for next appointment.    Laurita Iverson, RN

## 2020-06-24 NOTE — PATIENT INSTRUCTIONS
[facility] Main Line: [phone]    Call triage nurse with chills and/or temperature greater than or equal to 100.4, uncontrolled nausea/vomiting, diarrhea, constipation, dizziness, shortness of breath, chest pain, bleeding, unexplained bruising, or any new/concerning symptoms, questions/concerns.   If you are having any concerning symptoms or wish to speak to a provider before your next infusion visit, please call your care coordinator or triage to notify them so we can adequately serve you.   Triage Nurse Line: [phone]    If after hours, weekends, or holidays [phone]

## 2020-06-25 DIAGNOSIS — C45.0 MESOTHELIOMA (PLEURAL) (H): ICD-10-CM

## 2020-06-30 RX ORDER — PROCHLORPERAZINE MALEATE 10 MG
TABLET ORAL
Qty: 30 TABLET | Refills: 0 | OUTPATIENT
Start: 2020-06-30

## 2020-07-08 ENCOUNTER — ONCOLOGY VISIT (OUTPATIENT)
Dept: ONCOLOGY | Facility: CLINIC | Age: 75
End: 2020-07-08
Payer: MEDICARE

## 2020-07-08 ENCOUNTER — INFUSION THERAPY VISIT (OUTPATIENT)
Dept: INFUSION THERAPY | Facility: CLINIC | Age: 75
End: 2020-07-08
Payer: MEDICARE

## 2020-07-08 VITALS
RESPIRATION RATE: 14 BRPM | SYSTOLIC BLOOD PRESSURE: 139 MMHG | TEMPERATURE: 98.1 F | HEART RATE: 90 BPM | DIASTOLIC BLOOD PRESSURE: 79 MMHG | WEIGHT: 130.6 LBS | OXYGEN SATURATION: 97 % | BODY MASS INDEX: 21.73 KG/M2

## 2020-07-08 DIAGNOSIS — C45.0 MESOTHELIOMA (PLEURAL) (H): Primary | ICD-10-CM

## 2020-07-08 LAB
ALBUMIN SERPL-MCNC: 3.2 G/DL (ref 3.4–5)
ALP SERPL-CCNC: 69 U/L (ref 40–150)
ALT SERPL W P-5'-P-CCNC: 36 U/L (ref 0–70)
ANION GAP SERPL CALCULATED.3IONS-SCNC: 7 MMOL/L (ref 3–14)
AST SERPL W P-5'-P-CCNC: 29 U/L (ref 0–45)
BILIRUB SERPL-MCNC: 0.3 MG/DL (ref 0.2–1.3)
BUN SERPL-MCNC: 21 MG/DL (ref 7–30)
CALCIUM SERPL-MCNC: 9.3 MG/DL (ref 8.5–10.1)
CHLORIDE SERPL-SCNC: 105 MMOL/L (ref 94–109)
CO2 SERPL-SCNC: 26 MMOL/L (ref 20–32)
CREAT SERPL-MCNC: 0.74 MG/DL (ref 0.66–1.25)
GFR SERPL CREATININE-BSD FRML MDRD: 89 ML/MIN/{1.73_M2}
GLUCOSE SERPL-MCNC: 140 MG/DL (ref 70–99)
POTASSIUM SERPL-SCNC: 4.2 MMOL/L (ref 3.4–5.3)
PROT SERPL-MCNC: 7.3 G/DL (ref 6.8–8.8)
SODIUM SERPL-SCNC: 138 MMOL/L (ref 133–144)
TSH SERPL DL<=0.005 MIU/L-ACNC: 2.31 MU/L (ref 0.4–4)

## 2020-07-08 PROCEDURE — 84443 ASSAY THYROID STIM HORMONE: CPT | Performed by: INTERNAL MEDICINE

## 2020-07-08 PROCEDURE — 80053 COMPREHEN METABOLIC PANEL: CPT | Performed by: INTERNAL MEDICINE

## 2020-07-08 PROCEDURE — 99207 ZZC NO CHARGE NURSE ONLY: CPT

## 2020-07-08 PROCEDURE — 99207 ZZC NO CHARGE LOS: CPT

## 2020-07-08 PROCEDURE — 96413 CHEMO IV INFUSION 1 HR: CPT | Performed by: INTERNAL MEDICINE

## 2020-07-08 RX ORDER — HEPARIN SODIUM (PORCINE) LOCK FLUSH IV SOLN 100 UNIT/ML 100 UNIT/ML
5 SOLUTION INTRAVENOUS
Status: DISCONTINUED | OUTPATIENT
Start: 2020-07-08 | End: 2020-07-08 | Stop reason: HOSPADM

## 2020-07-08 RX ADMIN — Medication 250 ML: at 09:12

## 2020-07-08 RX ADMIN — HEPARIN SODIUM (PORCINE) LOCK FLUSH IV SOLN 100 UNIT/ML 5 ML: 100 SOLUTION at 10:13

## 2020-07-08 RX ADMIN — HEPARIN SODIUM (PORCINE) LOCK FLUSH IV SOLN 100 UNIT/ML 5 ML: 100 SOLUTION at 08:28

## 2020-07-08 NOTE — PROGRESS NOTES
Infusion Nursing Note:  Pradeep Figueroa  presents today for C2D1 Nivolumab.    Patient seen by provider today: No   present during visit today: Not Applicable.    Note: Patient states he is unable to take anything by mouth.  All intake goes through feeding tube.    Intravenous Access:  Implanted port.    Treatment Conditions:  Patient is not taking steroids.  Lab Results   Component Value Date     07/08/2020                   Lab Results   Component Value Date    POTASSIUM 4.2 07/08/2020           Lab Results   Component Value Date    MAG 2.4 05/28/2020            Lab Results   Component Value Date    CR 0.74 07/08/2020                   Lab Results   Component Value Date    DIMA 9.3 07/08/2020                Lab Results   Component Value Date    BILITOTAL 0.3 07/08/2020           Lab Results   Component Value Date    ALBUMIN 3.2 07/08/2020                    Lab Results   Component Value Date    ALT 36 07/08/2020           Lab Results   Component Value Date    AST 29 07/08/2020       Results reviewed, labs MET treatment parameters, ok to proceed with treatment.      Post Infusion Assessment:  Patient tolerated infusion without incident.  Blood return noted pre and post infusion.  Site patent and intact, free from redness, edema or discomfort.  No evidence of extravasations.  Access discontinued per protocol.       Discharge Plan:   Patient will return 7/22/2020 for next appointment.   Patient discharged in stable condition accompanied by: self.  Departure Mode: Ambulatory.    Carline Hauser RN

## 2020-07-08 NOTE — PROGRESS NOTES
"Patient's name and  were verified.  See Doc Flowsheet - IV assess for details.  IVAD accessed with 20G  3/4\" lobato gripper plus needle  blood return positive: YES  Site without redness, tenderness or swelling: YES  flushed with 30cc NS and 5cc 100u/ml heparin  Needle: Left accessed for infusion    Comments: Labs drawn.  Patient tolerated procedure without incident.    Nany Fonseca  RN, BSN, OCN        "

## 2020-07-21 NOTE — PROGRESS NOTES
"Oncology Follow Up Visit: July 22, 2020      Oncologist: Dr Myles Breen  PCP: Feliz Majano    Diagnosis: Pleural Mesothelioma  Pradeep Figueroa Jr.is a 74 yo male with history of epilepsy who presented to clinic in 7/2019 with progressive dysphagia. Difficult work up by GI with initial finding of achalasia. CT chest/abd/pelvis 2/18/20 showed significant circumferential mid-distal esophageal thickening with upstream dilation which was atypical for type II achalasia.  He was found to have an incidental moderate sized left sided pleural effusion   Along with some thickening in the parietal pleural lining and the right lower lobe and also thickening of the pleural lining in the mediastinum around the esophagus.Further endoscopy and EUS showed esophageal stricture with thickening 30 cm from incisors within mediastinum but outside esophageal wall. Biopsies of the specimen showed Loss of BAP1 protein expression that is  consistent   with malignant mesothelioma, epithelioid type based on this limited biopsy.    Treatment:   3/18/2020 began pemetrexed/carboplatin/bevacizumab  6/24/2020 ipilimumab/nivilumab    Interval History: Mr Figueroa is contacted today prior to continuation of treatment with ipilimumab/nivilumab for his mesothelioma. Pt reports fatigue, change in stools from darker to lighter and is now just using formula through gtube for feedings. He is seeing an increase in \"sour stomach\" though using Prilosec each evening- had used pepto bismol as well. Biggest complaint is decreased stamina/ weakness. He is still getting out to use riding mower and but needs frequent breaks and help for some activities that were never a problem. Denies pain, mouth sores, fevers, depression or sleep issues.   Rest of comprehensive and complete ROS is reviewed and is negative.   Past Medical History:   Diagnosis Date     Mesothelioma (H)      Seizures (H)      Current Outpatient Medications   Medication     acetaminophen (TYLENOL) " 325 MG tablet     albuterol (PROAIR HFA/PROVENTIL HFA/VENTOLIN HFA) 108 (90 Base) MCG/ACT inhaler     cholecalciferol 25 MCG (1000 UT) TABS     folic acid (FOLVITE) 1 MG tablet     lamoTRIgine (LAMICTAL) 200 MG tablet     LORazepam (ATIVAN) 0.5 MG tablet     Nutritional Supplements (ISOSOURCE 1.5 DIMA) LIQD     omeprazole (PRILOSEC) 2 mg/mL suspension     oxyCODONE (ROXICODONE) 5 MG tablet     prochlorperazine (COMPAZINE) 10 MG tablet     No current facility-administered medications for this visit.      Facility-Administered Medications Ordered in Other Visits   Medication     heparin 100 UNIT/ML injection 5 mL     No Known Allergies    Physical Exam:BP (!) 142/82 (BP Location: Right arm)   Pulse 97   Temp 98.3  F (36.8  C) (Oral)   Resp 18   Wt 58.8 kg (129 lb 9.6 oz)   SpO2 94%   BMI 21.57 kg/m     ECOG PS- 0-1  Constitutional: Alert, thin, cooperative, and in no distress.   ENT: Eyes bright, no drooling  GI:  PEG site with no redness or discharge seen on video  RESP: No audible wheeze, cough, or visible cyanosis.  No visible retractions or increased work of breathing.    SKIN: Visible skin clear. No significant rash, abnormal pigmentation or lesions.  NEURO: Cranial nerves grossly intact.  Mentation and speech appropriate for age.  PSYCH: Mentation appears normal, affect normal/bright, judgement and insight intact, normal speech and appearance well-groomed.  The rest of a comprehensive physical examination is deferred due to PHE (public health emergency) video visit restrictions     Laboratory Results:   No results found for any visits on 07/22/20.    Assessment and Plan:    Pleural Mesothelioma- pt began treatment on 6/24/2020 with ipilimumab/nivilumab and is tolerating with few side effects including weakness, mild nausea and increased stomach pain.   He will continue with cycle 3 today as planned imaging and review with Dr. Breen in August for review and further planning.  Nutrition through  Gtube-patient has PEG tube in place and is gaining back some weight with total dependence on formula feeding. May be able to use some foods or fluids to encourage saliva with sours and sips of fluids.   Nausea-patient was using the oral antiemetics and improved- will continue to use as needed.    Regurgitation-  using prilosec solution daily - reporting still getting sour stomach some times- will move to bid dosing of the prilosec solution..   Seizure disorder-no recent seizures - continues on Lamictal as previous  Based upon CDC guidance and to observe social distancing in the setting of the COVID-19 pandemic, the patient was seen virtually via video visit.   Location of pt- exam room  Location of provider- office in clinic  Start of visit - 12:52pm  End of visit- 1:06 pm  Total Time 14 min visit  Rajni DrummondCNp

## 2020-07-22 ENCOUNTER — VIRTUAL VISIT (OUTPATIENT)
Dept: ONCOLOGY | Facility: CLINIC | Age: 75
End: 2020-07-22
Payer: MEDICARE

## 2020-07-22 ENCOUNTER — INFUSION THERAPY VISIT (OUTPATIENT)
Dept: INFUSION THERAPY | Facility: CLINIC | Age: 75
End: 2020-07-22
Payer: MEDICARE

## 2020-07-22 ENCOUNTER — ONCOLOGY VISIT (OUTPATIENT)
Dept: ONCOLOGY | Facility: CLINIC | Age: 75
End: 2020-07-22
Payer: MEDICARE

## 2020-07-22 VITALS
RESPIRATION RATE: 18 BRPM | BODY MASS INDEX: 21.57 KG/M2 | OXYGEN SATURATION: 94 % | WEIGHT: 129.6 LBS | TEMPERATURE: 98.3 F | SYSTOLIC BLOOD PRESSURE: 142 MMHG | DIASTOLIC BLOOD PRESSURE: 82 MMHG | HEART RATE: 97 BPM

## 2020-07-22 DIAGNOSIS — C45.0 MESOTHELIOMA (PLEURAL) (H): Primary | ICD-10-CM

## 2020-07-22 DIAGNOSIS — R11.0 NAUSEA: ICD-10-CM

## 2020-07-22 DIAGNOSIS — K21.9 GASTROESOPHAGEAL REFLUX DISEASE, ESOPHAGITIS PRESENCE NOT SPECIFIED: ICD-10-CM

## 2020-07-22 DIAGNOSIS — E46 MALNUTRITION, UNSPECIFIED TYPE (H): ICD-10-CM

## 2020-07-22 LAB
ALBUMIN SERPL-MCNC: 3.4 G/DL (ref 3.4–5)
ALP SERPL-CCNC: 60 U/L (ref 40–150)
ALT SERPL W P-5'-P-CCNC: 27 U/L (ref 0–70)
ANION GAP SERPL CALCULATED.3IONS-SCNC: 5 MMOL/L (ref 3–14)
AST SERPL W P-5'-P-CCNC: 24 U/L (ref 0–45)
BILIRUB SERPL-MCNC: 0.2 MG/DL (ref 0.2–1.3)
BUN SERPL-MCNC: 24 MG/DL (ref 7–30)
CALCIUM SERPL-MCNC: 9.3 MG/DL (ref 8.5–10.1)
CHLORIDE SERPL-SCNC: 104 MMOL/L (ref 94–109)
CO2 SERPL-SCNC: 28 MMOL/L (ref 20–32)
CREAT SERPL-MCNC: 0.78 MG/DL (ref 0.66–1.25)
GFR SERPL CREATININE-BSD FRML MDRD: 87 ML/MIN/{1.73_M2}
GLUCOSE SERPL-MCNC: 107 MG/DL (ref 70–99)
POTASSIUM SERPL-SCNC: 4.5 MMOL/L (ref 3.4–5.3)
PROT SERPL-MCNC: 7.4 G/DL (ref 6.8–8.8)
SODIUM SERPL-SCNC: 137 MMOL/L (ref 133–144)
TSH SERPL DL<=0.005 MIU/L-ACNC: 2.55 MU/L (ref 0.4–4)

## 2020-07-22 PROCEDURE — 84443 ASSAY THYROID STIM HORMONE: CPT | Performed by: NURSE PRACTITIONER

## 2020-07-22 PROCEDURE — 80053 COMPREHEN METABOLIC PANEL: CPT | Performed by: NURSE PRACTITIONER

## 2020-07-22 PROCEDURE — 99214 OFFICE O/P EST MOD 30 MIN: CPT | Mod: 95 | Performed by: NURSE PRACTITIONER

## 2020-07-22 PROCEDURE — 99207 ZZC NO CHARGE NURSE ONLY: CPT

## 2020-07-22 PROCEDURE — 96413 CHEMO IV INFUSION 1 HR: CPT | Performed by: NURSE PRACTITIONER

## 2020-07-22 PROCEDURE — 99207 ZZC NO CHARGE LOS: CPT

## 2020-07-22 RX ORDER — HEPARIN SODIUM (PORCINE) LOCK FLUSH IV SOLN 100 UNIT/ML 100 UNIT/ML
5 SOLUTION INTRAVENOUS
Status: DISCONTINUED | OUTPATIENT
Start: 2020-07-22 | End: 2020-07-24 | Stop reason: HOSPADM

## 2020-07-22 RX ORDER — MEPERIDINE HYDROCHLORIDE 25 MG/ML
25 INJECTION INTRAMUSCULAR; INTRAVENOUS; SUBCUTANEOUS EVERY 30 MIN PRN
Status: CANCELLED | OUTPATIENT
Start: 2020-07-22

## 2020-07-22 RX ORDER — SODIUM CHLORIDE 9 MG/ML
1000 INJECTION, SOLUTION INTRAVENOUS CONTINUOUS PRN
Status: CANCELLED
Start: 2020-07-22

## 2020-07-22 RX ORDER — DIPHENHYDRAMINE HYDROCHLORIDE 50 MG/ML
50 INJECTION INTRAMUSCULAR; INTRAVENOUS
Status: CANCELLED
Start: 2020-07-22

## 2020-07-22 RX ORDER — HEPARIN SODIUM (PORCINE) LOCK FLUSH IV SOLN 100 UNIT/ML 100 UNIT/ML
5 SOLUTION INTRAVENOUS
Status: CANCELLED | OUTPATIENT
Start: 2020-07-22

## 2020-07-22 RX ORDER — EPINEPHRINE 1 MG/ML
0.3 INJECTION, SOLUTION INTRAMUSCULAR; SUBCUTANEOUS EVERY 5 MIN PRN
Status: CANCELLED | OUTPATIENT
Start: 2020-07-22

## 2020-07-22 RX ORDER — NALOXONE HYDROCHLORIDE 0.4 MG/ML
.1-.4 INJECTION, SOLUTION INTRAMUSCULAR; INTRAVENOUS; SUBCUTANEOUS
Status: CANCELLED | OUTPATIENT
Start: 2020-07-22

## 2020-07-22 RX ORDER — METHYLPREDNISOLONE SODIUM SUCCINATE 125 MG/2ML
125 INJECTION, POWDER, LYOPHILIZED, FOR SOLUTION INTRAMUSCULAR; INTRAVENOUS
Status: CANCELLED
Start: 2020-07-22

## 2020-07-22 RX ORDER — HEPARIN SODIUM,PORCINE 10 UNIT/ML
5 VIAL (ML) INTRAVENOUS
Status: CANCELLED | OUTPATIENT
Start: 2020-07-22

## 2020-07-22 RX ORDER — ALBUTEROL SULFATE 90 UG/1
1-2 AEROSOL, METERED RESPIRATORY (INHALATION)
Status: CANCELLED
Start: 2020-07-22

## 2020-07-22 RX ORDER — HEPARIN SODIUM (PORCINE) LOCK FLUSH IV SOLN 100 UNIT/ML 100 UNIT/ML
5 SOLUTION INTRAVENOUS
Status: DISCONTINUED | OUTPATIENT
Start: 2020-07-22 | End: 2020-07-22 | Stop reason: HOSPADM

## 2020-07-22 RX ORDER — ALBUTEROL SULFATE 0.83 MG/ML
2.5 SOLUTION RESPIRATORY (INHALATION)
Status: CANCELLED | OUTPATIENT
Start: 2020-07-22

## 2020-07-22 RX ORDER — LORAZEPAM 2 MG/ML
0.5 INJECTION INTRAMUSCULAR EVERY 4 HOURS PRN
Status: CANCELLED
Start: 2020-07-22

## 2020-07-22 RX ADMIN — Medication 250 ML: at 13:37

## 2020-07-22 RX ADMIN — HEPARIN SODIUM (PORCINE) LOCK FLUSH IV SOLN 100 UNIT/ML 5 ML: 100 SOLUTION at 14:16

## 2020-07-22 RX ADMIN — HEPARIN SODIUM (PORCINE) LOCK FLUSH IV SOLN 100 UNIT/ML 5 ML: 100 SOLUTION at 12:14

## 2020-07-22 ASSESSMENT — PAIN SCALES - GENERAL: PAINLEVEL: NO PAIN (0)

## 2020-07-22 NOTE — PROGRESS NOTES
Infusion Nursing Note:  Pradeep LYRIC Henry  presents today for C3D1 Opdivo.    Patient seen by provider today: Yes: Rajni Drummond NP   present during visit today: Not Applicable.    Note: N/A.    Intravenous Access:  Implanted Port.    Treatment Conditions:  Lab Results   Component Value Date    HGB 12.1 06/24/2020     Lab Results   Component Value Date    WBC 5.7 06/24/2020      Lab Results   Component Value Date    ANEU 3.4 06/24/2020     Lab Results   Component Value Date     06/24/2020      Lab Results   Component Value Date     07/22/2020                   Lab Results   Component Value Date    POTASSIUM 4.5 07/22/2020           Lab Results   Component Value Date    MAG 2.4 05/28/2020            Lab Results   Component Value Date    CR 0.78 07/22/2020                   Lab Results   Component Value Date    DIMA 9.3 07/22/2020                Lab Results   Component Value Date    BILITOTAL 0.2 07/22/2020           Lab Results   Component Value Date    ALBUMIN 3.4 07/22/2020                    Lab Results   Component Value Date    ALT 27 07/22/2020           Lab Results   Component Value Date    AST 24 07/22/2020       Results reviewed, labs MET treatment parameters, ok to proceed with treatment.      Post Infusion Assessment:  Patient tolerated infusion without incident.       Discharge Plan:    Patient will return 8/5/2020 for next appointment.   Patient discharged in stable condition accompanied by: self.  Departure Mode: Ambulatory.    Jessica Corley RN

## 2020-07-22 NOTE — LETTER
"    7/22/2020         RE: Pradeep Figueroa Jr.  [address] [place] [address]        Dear Colleague,    Thank you for referring your patient, Pradeep Figueroa Jr., to the [facility]. Please see a copy of my visit note below.    Oncology Follow Up Visit: July 22, 2020      Oncologist: Dr Myles Breen  PCP: Feliz Majano    Diagnosis: Pleural Mesothelioma  Pradeep Figueroa Jr.is a 76 yo male with history of epilepsy who presented to clinic in 7/2019 with progressive dysphagia. Difficult work up by GI with initial finding of achalasia. CT chest/abd/pelvis 2/18/20 showed significant circumferential mid-distal esophageal thickening with upstream dilation which was atypical for type II achalasia.  He was found to have an incidental moderate sized left sided pleural effusion   Along with some thickening in the parietal pleural lining and the right lower lobe and also thickening of the pleural lining in the mediastinum around the esophagus.Further endoscopy and EUS showed esophageal stricture with thickening 30 cm from incisors within mediastinum but outside esophageal wall. Biopsies of the specimen showed Loss of BAP1 protein expression that is  consistent   with malignant mesothelioma, epithelioid type based on this limited biopsy.    Treatment:   3/18/2020 began pemetrexed/carboplatin/bevacizumab  6/24/2020 ipilimumab/nivilumab    Interval History: Mr Figueroa is contacted today prior to continuation of treatment with ipilimumab/nivilumab for his mesothelioma. Pt reports fatigue, change in stools from darker to lighter and is now just using formula through gtube for feedings. He is seeing an increase in \"sour stomach\" though using Prilosec each evening- had used pepto bismol as well. Biggest complaint is decreased stamina/ weakness. He is still getting out to use riding mower and but needs frequent breaks and help for some activities that were never a problem. Denies pain, mouth sores, " fevers, depression or sleep issues.   Rest of comprehensive and complete ROS is reviewed and is negative.   Past Medical History:   Diagnosis Date     Mesothelioma (H)      Seizures (H)      Current Outpatient Medications   Medication     acetaminophen (TYLENOL) 325 MG tablet     albuterol (PROAIR HFA/PROVENTIL HFA/VENTOLIN HFA) 108 (90 Base) MCG/ACT inhaler     cholecalciferol 25 MCG (1000 UT) TABS     folic acid (FOLVITE) 1 MG tablet     lamoTRIgine (LAMICTAL) 200 MG tablet     LORazepam (ATIVAN) 0.5 MG tablet     Nutritional Supplements (ISOSOURCE 1.5 DIMA) LIQD     omeprazole (PRILOSEC) 2 mg/mL suspension     oxyCODONE (ROXICODONE) 5 MG tablet     prochlorperazine (COMPAZINE) 10 MG tablet     No current facility-administered medications for this visit.      Facility-Administered Medications Ordered in Other Visits   Medication     heparin 100 UNIT/ML injection 5 mL     No Known Allergies    Physical Exam:BP (!) 142/82 (BP Location: Right arm)   Pulse 97   Temp 98.3  F (36.8  C) (Oral)   Resp 18   Wt 58.8 kg (129 lb 9.6 oz)   SpO2 94%   BMI 21.57 kg/m     ECOG PS- 0-1  Constitutional: Alert, thin, cooperative, and in no distress.   ENT: Eyes bright, no drooling  GI:  PEG site with no redness or discharge seen on video  RESP: No audible wheeze, cough, or visible cyanosis.  No visible retractions or increased work of breathing.    SKIN: Visible skin clear. No significant rash, abnormal pigmentation or lesions.  NEURO: Cranial nerves grossly intact.  Mentation and speech appropriate for age.  PSYCH: Mentation appears normal, affect normal/bright, judgement and insight intact, normal speech and appearance well-groomed.  The rest of a comprehensive physical examination is deferred due to PHE (public health emergency) video visit restrictions     Laboratory Results:   No results found for any visits on 07/22/20.    Assessment and Plan:    Pleural Mesothelioma- pt began treatment on 6/24/2020 with  ipilimumab/nivilumab and is tolerating with few side effects including weakness, mild nausea and increased stomach pain.   He will continue with cycle 3 today as planned imaging and review with Dr. Breen in August for review and further planning.  Nutrition through Gtube-patient has PEG tube in place and is gaining back some weight with total dependence on formula feeding. May be able to use some foods or fluids to encourage saliva with sours and sips of fluids.   Nausea-patient was using the oral antiemetics and improved- will continue to use as needed.    Regurgitation-  using prilosec solution daily - reporting still getting sour stomach some times- will move to bid dosing of the prilosec solution..   Seizure disorder-no recent seizures - continues on Lamictal as previous  Based upon CDC guidance and to observe social distancing in the setting of the COVID-19 pandemic, the patient was seen virtually via video visit.   Location of pt- exam room  Location of provider- office in clinic  Start of visit - 12:52pm  End of visit- 1:06 pm  Total Time 14 min visit  Rajni Drummond CNp              Again, thank you for allowing me to participate in the care of your patient.        Sincerely,        Rajni Drummond NP, APRN CNP

## 2020-07-22 NOTE — PROGRESS NOTES
Port needle left accessed for treatment. Tolerated port access and draw without complaint. Port site scrubbed with Chloraprep for 30 seconds and allowed to dry completely prior to dressing application. Accessed using sterile technique. [place] tubes drawn-Red gel/Green/Purple tubes. Double signed by patient and RN. See documentation flowsheet. Ericka Hewitt, RN, BSN, OCN

## 2020-07-22 NOTE — NURSING NOTE
SYMPTOM QUESTIONNAIRE    Pain: no    Nausea/Vomiting: nausea    Mouth Sores: no    Shortness of Breath: yes uses inhaler when needed    Smoking: no    Fever or Chills: no    Hard Stools: no    Soft Stools: yes, no rx tried    Weight Loss: no    Weakness: stamina and arm strength decreased    Burning, numbness or tingling in hands or feet: no    Problems with skin or swelling: no    Memory Loss: no    Anxiety or Depression: no    Trouble Sleeping: no    Ariadna Wallace LPN on 7/22/2020 at 12:48 PM

## 2020-07-23 ENCOUNTER — MYC MEDICAL ADVICE (OUTPATIENT)
Dept: ONCOLOGY | Facility: CLINIC | Age: 75
End: 2020-07-23

## 2020-07-23 DIAGNOSIS — C45.0 MESOTHELIOMA (PLEURAL) (H): Primary | ICD-10-CM

## 2020-07-23 NOTE — TELEPHONE ENCOUNTER
Called pt and spouse back regarding symptomatic mychart; Yossi stated he is gravity feeding 2 cans of Isosource in the morning, at noon and in the evening, it takes about an hour each time and about half an hour after he'll get a feeling of fullness that lasts for about another hour. He has noticed lower abdominal fullness around the belt line. Thinks he's gained about 10 lbs in the last 2 months. He just increased omeprazole to twice daily since visit with Rajni yesterday. He pushes 2 cups of water with each feeding and a cup of water each time he takes his omeprazole so he is getting plenty of fluids. Denied any pain. He thinks he should cut back on the amount of Isosource as he feels abd fullness is contributing to sob when walking.  Denied chest pain or congestion, leg swelling or cough. Wonders if imaging in August should include abd. Paged Dr. Breen.    Per Dr. Breen: pt can experiment with 1 less can a day to see if abd fullness decreases, keep omeprazole BID for now. Will change CT to CT CAP, move up 1-2 weeks. Order entered.

## 2020-08-03 DIAGNOSIS — C45.0 MESOTHELIOMA (PLEURAL) (H): Primary | ICD-10-CM

## 2020-08-03 RX ORDER — ALBUTEROL SULFATE 90 UG/1
1-2 AEROSOL, METERED RESPIRATORY (INHALATION)
Status: CANCELLED
Start: 2020-08-05

## 2020-08-03 RX ORDER — ALBUTEROL SULFATE 0.83 MG/ML
2.5 SOLUTION RESPIRATORY (INHALATION)
Status: CANCELLED | OUTPATIENT
Start: 2020-08-05

## 2020-08-03 RX ORDER — MEPERIDINE HYDROCHLORIDE 25 MG/ML
25 INJECTION INTRAMUSCULAR; INTRAVENOUS; SUBCUTANEOUS EVERY 30 MIN PRN
Status: CANCELLED | OUTPATIENT
Start: 2020-08-05

## 2020-08-03 RX ORDER — DIPHENHYDRAMINE HYDROCHLORIDE 50 MG/ML
50 INJECTION INTRAMUSCULAR; INTRAVENOUS
Status: CANCELLED
Start: 2020-08-05

## 2020-08-03 RX ORDER — METHYLPREDNISOLONE SODIUM SUCCINATE 125 MG/2ML
125 INJECTION, POWDER, LYOPHILIZED, FOR SOLUTION INTRAMUSCULAR; INTRAVENOUS
Status: CANCELLED
Start: 2020-08-05

## 2020-08-03 RX ORDER — EPINEPHRINE 1 MG/ML
0.3 INJECTION, SOLUTION INTRAMUSCULAR; SUBCUTANEOUS EVERY 5 MIN PRN
Status: CANCELLED | OUTPATIENT
Start: 2020-08-05

## 2020-08-03 RX ORDER — NALOXONE HYDROCHLORIDE 0.4 MG/ML
.1-.4 INJECTION, SOLUTION INTRAMUSCULAR; INTRAVENOUS; SUBCUTANEOUS
Status: CANCELLED | OUTPATIENT
Start: 2020-08-05

## 2020-08-03 RX ORDER — SODIUM CHLORIDE 9 MG/ML
1000 INJECTION, SOLUTION INTRAVENOUS CONTINUOUS PRN
Status: CANCELLED
Start: 2020-08-05

## 2020-08-03 RX ORDER — LORAZEPAM 2 MG/ML
0.5 INJECTION INTRAMUSCULAR EVERY 4 HOURS PRN
Status: CANCELLED
Start: 2020-08-05

## 2020-08-03 RX ORDER — HEPARIN SODIUM,PORCINE 10 UNIT/ML
5 VIAL (ML) INTRAVENOUS
Status: CANCELLED | OUTPATIENT
Start: 2020-08-05

## 2020-08-03 RX ORDER — HEPARIN SODIUM (PORCINE) LOCK FLUSH IV SOLN 100 UNIT/ML 100 UNIT/ML
5 SOLUTION INTRAVENOUS
Status: CANCELLED | OUTPATIENT
Start: 2020-08-05

## 2020-08-05 ENCOUNTER — ONCOLOGY VISIT (OUTPATIENT)
Dept: ONCOLOGY | Facility: CLINIC | Age: 75
End: 2020-08-05
Payer: MEDICARE

## 2020-08-05 ENCOUNTER — INFUSION THERAPY VISIT (OUTPATIENT)
Dept: INFUSION THERAPY | Facility: CLINIC | Age: 75
End: 2020-08-05
Payer: MEDICARE

## 2020-08-05 VITALS
DIASTOLIC BLOOD PRESSURE: 85 MMHG | HEART RATE: 86 BPM | SYSTOLIC BLOOD PRESSURE: 136 MMHG | OXYGEN SATURATION: 98 % | RESPIRATION RATE: 18 BRPM | TEMPERATURE: 97.6 F | BODY MASS INDEX: 21.33 KG/M2 | WEIGHT: 128.2 LBS

## 2020-08-05 VITALS
WEIGHT: 128.2 LBS | TEMPERATURE: 97.6 F | DIASTOLIC BLOOD PRESSURE: 85 MMHG | OXYGEN SATURATION: 98 % | HEART RATE: 86 BPM | BODY MASS INDEX: 21.33 KG/M2 | SYSTOLIC BLOOD PRESSURE: 136 MMHG

## 2020-08-05 DIAGNOSIS — C45.0 MESOTHELIOMA (PLEURAL) (H): Primary | ICD-10-CM

## 2020-08-05 DIAGNOSIS — R19.5 LOOSE STOOLS: ICD-10-CM

## 2020-08-05 DIAGNOSIS — G40.909 SEIZURE DISORDER (H): ICD-10-CM

## 2020-08-05 DIAGNOSIS — Z93.1 FEEDING BY G-TUBE (H): ICD-10-CM

## 2020-08-05 LAB
ALBUMIN SERPL-MCNC: 3.3 G/DL (ref 3.4–5)
ALP SERPL-CCNC: 64 U/L (ref 40–150)
ALT SERPL W P-5'-P-CCNC: 29 U/L (ref 0–70)
ANION GAP SERPL CALCULATED.3IONS-SCNC: 3 MMOL/L (ref 3–14)
AST SERPL W P-5'-P-CCNC: 30 U/L (ref 0–45)
BILIRUB SERPL-MCNC: 0.3 MG/DL (ref 0.2–1.3)
BUN SERPL-MCNC: 23 MG/DL (ref 7–30)
CALCIUM SERPL-MCNC: 9.2 MG/DL (ref 8.5–10.1)
CHLORIDE SERPL-SCNC: 106 MMOL/L (ref 94–109)
CO2 SERPL-SCNC: 28 MMOL/L (ref 20–32)
CREAT SERPL-MCNC: 0.82 MG/DL (ref 0.66–1.25)
GFR SERPL CREATININE-BSD FRML MDRD: 85 ML/MIN/{1.73_M2}
GLUCOSE SERPL-MCNC: 130 MG/DL (ref 70–99)
POTASSIUM SERPL-SCNC: 4.6 MMOL/L (ref 3.4–5.3)
PROT SERPL-MCNC: 7.5 G/DL (ref 6.8–8.8)
SODIUM SERPL-SCNC: 137 MMOL/L (ref 133–144)
TSH SERPL DL<=0.005 MIU/L-ACNC: 1.63 MU/L (ref 0.4–4)

## 2020-08-05 PROCEDURE — 84443 ASSAY THYROID STIM HORMONE: CPT | Performed by: NURSE PRACTITIONER

## 2020-08-05 PROCEDURE — 99207 ZZC NO CHARGE LOS: CPT

## 2020-08-05 PROCEDURE — 99214 OFFICE O/P EST MOD 30 MIN: CPT | Mod: 25 | Performed by: NURSE PRACTITIONER

## 2020-08-05 PROCEDURE — 99207 ZZC NO CHARGE NURSE ONLY: CPT

## 2020-08-05 PROCEDURE — 96417 CHEMO IV INFUS EACH ADDL SEQ: CPT | Performed by: NURSE PRACTITIONER

## 2020-08-05 PROCEDURE — 80053 COMPREHEN METABOLIC PANEL: CPT | Performed by: NURSE PRACTITIONER

## 2020-08-05 PROCEDURE — 96413 CHEMO IV INFUSION 1 HR: CPT | Performed by: NURSE PRACTITIONER

## 2020-08-05 RX ORDER — HEPARIN SODIUM (PORCINE) LOCK FLUSH IV SOLN 100 UNIT/ML 100 UNIT/ML
5 SOLUTION INTRAVENOUS
Status: DISCONTINUED | OUTPATIENT
Start: 2020-08-05 | End: 2020-08-05 | Stop reason: HOSPADM

## 2020-08-05 RX ADMIN — HEPARIN SODIUM (PORCINE) LOCK FLUSH IV SOLN 100 UNIT/ML 5 ML: 100 SOLUTION at 12:03

## 2020-08-05 RX ADMIN — Medication 250 ML: at 12:57

## 2020-08-05 RX ADMIN — HEPARIN SODIUM (PORCINE) LOCK FLUSH IV SOLN 100 UNIT/ML 5 ML: 100 SOLUTION at 14:23

## 2020-08-05 ASSESSMENT — PAIN SCALES - GENERAL: PAINLEVEL: NO PAIN (0)

## 2020-08-05 NOTE — PROGRESS NOTES
Oncology Follow Up Visit: August 5, 2020      Oncologist: Dr Myles Breen  PCP: Feliz Majano    Diagnosis: Pleural Mesothelioma  Pradeep Figueroa Jr.is a 74 yo male with history of epilepsy who presented to clinic in 7/2019 with progressive dysphagia. Difficult work up by GI with initial finding of achalasia. CT chest/abd/pelvis 2/18/20 showed significant circumferential mid-distal esophageal thickening with upstream dilation which was atypical for type II achalasia.  He was found to have an incidental moderate sized left sided pleural effusion   Along with some thickening in the parietal pleural lining and the right lower lobe and also thickening of the pleural lining in the mediastinum around the esophagus.Further endoscopy and EUS showed esophageal stricture with thickening 30 cm from incisors within mediastinum but outside esophageal wall. Biopsies of the specimen showed Loss of BAP1 protein expression that is  consistent   with malignant mesothelioma, epithelioid type based on this limited biopsy.    Treatment:   3/18/2020 began pemetrexed/carboplatin/bevacizumab  6/24/2020 ipilimumab/nivilumab    Interval History: Mr Figueroa is contacted today with continuation of treatment with ipilimumab/nivilumab for his mesothelioma. Pt is concerned about the Gtube site noting more discharge than usual but has noted no new redness irritation swelling bleeding or stomach upset-he did bring in an old gauze to show this provider the discharge.  He otherwise states he is tolerating the feedings well but has cut down 1 carton due to bloating and is not seen a decline in his weight.  He does have loose stools noting 2-3 stools per day with first formed and then loose-uses Imodium occasionally for control but afraid of constipation-no blood ever seen in the stool.  He has some shortness of breath with activity but it does not feel that is new.  And he has an occasional rough voice but again does not feel this is abnormal for  him.  He is using his Lamictal and has had no recent seizures.  Otherwise denies new pain nausea mouth sores fevers weakness or trouble sleeping.   Rest of comprehensive and complete ROS is reviewed and is negative.   Past Medical History:   Diagnosis Date     Mesothelioma (H)      Seizures (H)      Current Outpatient Medications   Medication     acetaminophen (TYLENOL) 325 MG tablet     albuterol (PROAIR HFA/PROVENTIL HFA/VENTOLIN HFA) 108 (90 Base) MCG/ACT inhaler     cholecalciferol 25 MCG (1000 UT) TABS     folic acid (FOLVITE) 1 MG tablet     lamoTRIgine (LAMICTAL) 200 MG tablet     LORazepam (ATIVAN) 0.5 MG tablet     Nutritional Supplements (ISOSOURCE 1.5 DIMA) LIQD     omeprazole (PRILOSEC) 2 mg/mL suspension     oxyCODONE (ROXICODONE) 5 MG tablet     prochlorperazine (COMPAZINE) 10 MG tablet     No current facility-administered medications for this visit.      Facility-Administered Medications Ordered in Other Visits   Medication     heparin 100 UNIT/ML injection 5 mL     No Known Allergies    Physical Exam:/85   Pulse 86   Temp 97.6  F (36.4  C)   Wt 58.2 kg (128 lb 3.2 oz)   SpO2 98%   BMI 21.33 kg/m     ECOG PS- 0-1  Constitutional: Alert, thin, cooperative, and in no distress.   ENT: Eyes bright, no drooling  GI:  PEG site with no redness or swelling.  He does have some discharge at the site but it is small not odorous and I believe it may be a slight bit of leakage from around the site.  Does not appear it is infection and patient was reassured.  RESP: No audible wheeze, cough, or visible cyanosis.  No visible retractions or increased work of breathing.    SKIN: skin clear. No significant rash, abnormal pigmentation or lesions.  NEURO: Cranial nerves grossly intact.  Mentation and speech appropriate for age.  PSYCH: Mentation appears normal, affect normal/bright, judgement and insight intact, normal speech and appearance well-groomed.    Laboratory Results:   Results for orders placed or  performed in visit on 08/05/20   Comprehensive metabolic panel     Status: Abnormal   Result Value Ref Range    Sodium 137 133 - 144 mmol/L    Potassium 4.6 3.4 - 5.3 mmol/L    Chloride 106 94 - 109 mmol/L    Carbon Dioxide 28 20 - 32 mmol/L    Anion Gap 3 3 - 14 mmol/L    Glucose 130 (H) 70 - 99 mg/dL    Urea Nitrogen 23 7 - 30 mg/dL    Creatinine 0.82 0.66 - 1.25 mg/dL    GFR Estimate 85 >60 mL/min/[1.73_m2]    GFR Estimate If Black >90 >60 mL/min/[1.73_m2]    Calcium 9.2 8.5 - 10.1 mg/dL    Bilirubin Total 0.3 0.2 - 1.3 mg/dL    Albumin 3.3 (L) 3.4 - 5.0 g/dL    Protein Total 7.5 6.8 - 8.8 g/dL    Alkaline Phosphatase 64 40 - 150 U/L    ALT 29 0 - 70 U/L    AST 30 0 - 45 U/L   TSH with free T4 reflex     Status: None   Result Value Ref Range    TSH 1.63 0.40 - 4.00 mU/L     Assessment and Plan:    Pleural Mesothelioma- pt continues with treatment with ipilimumab/nivolumab since 6/24/2020 with good tolerance noted however he does share concerns of loose stools but no bleeding-this could be related to immunotherapy but does not show signs of the colitis and believe this to be more related to his tube feedings-asked him to report any blood in stool or increase in frequency of loose stools.  He will continue with cycle 4 today as planned.  He has imaging and review with Dr. Breen in August for review and further planning.  Nutrition through Gtube-PEG tube is working out well for patient for nutritional support with Isosource with very little pleasure eating-Encouraged sour candies frequent sips of water and other things to continue to support saliva and prevent dry mouth.  Patient was worried about G-tube infection however today review shows no sign of infection and good working order of the G-tube itself reassured patient that some discharge is normal and also educated him on tightening the the tube to prevent leakage.  We will continue to use gauze to the area to prevent irritation around G-tube  site.  Regurgitation-  using prilosec solution twice daily-shares the switch to twice daily Prilosec has been helpful for the regurgitation.  Seizure disorder-no recent seizures - continues on Lamictal as previous.  This was a 25 min visit with > 50 % in education and management of concerns.   Rajni Drummond,Cnp

## 2020-08-05 NOTE — NURSING NOTE
"Oncology Rooming Note    August 5, 2020 1:18 PM   Pradeep Figueroa Jr. is a 75 year old male who presents for:    Chief Complaint   Patient presents with     Oncology Clinic Visit     check g-tube and discuss diarrhea     Initial Vitals: /85   Pulse 86   Temp 97.6  F (36.4  C)   Wt 58.2 kg (128 lb 3.2 oz)   SpO2 98%   BMI 21.33 kg/m   Estimated body mass index is 21.33 kg/m  as calculated from the following:    Height as of 5/28/20: 1.651 m (5' 5\").    Weight as of this encounter: 58.2 kg (128 lb 3.2 oz). Body surface area is 1.63 meters squared.  No Pain (0) Comment: Data Unavailable   No LMP for male patient.  Allergies reviewed: Yes  Medications reviewed: Yes    Medications: Medication refills not needed today.  Pharmacy name entered into [employer]: [id number] - [facility] [address]    Clinical concerns: Yes Rajni was notified.      Ewa Paniagua CMA              "

## 2020-08-05 NOTE — PROGRESS NOTES
Infusion Nursing Note:  Pradeep LYRIC Dsouzajonnie  presents today for C4D1 Opdivo/Yervoy.    Patient seen by provider today: Yes: Will be seeing Marie Drummond NP ininfusion   present during visit today: Not Applicable.    Note: Yossi has been having some loose stools alternating with some softer, semi solid stools.  He feels it is due to the immunotherapy.  He has been using some immodium conservatively as not not be constipated.      Rajni will also be assessing [facility] site today, see assessment     Intravenous Access:  Implanted Port.    Treatment Conditions:  Lab Results   Component Value Date     08/05/2020                   Lab Results   Component Value Date    POTASSIUM 4.6 08/05/2020           Lab Results   Component Value Date    MAG 2.4 05/28/2020            Lab Results   Component Value Date    CR 0.82 08/05/2020                   Lab Results   Component Value Date    DIMA 9.2 08/05/2020                Lab Results   Component Value Date    BILITOTAL 0.3 08/05/2020           Lab Results   Component Value Date    ALBUMIN 3.3 08/05/2020                    Lab Results   Component Value Date    ALT 29 08/05/2020           Lab Results   Component Value Date    AST 30 08/05/2020       Results reviewed, labs MET treatment parameters, ok to proceed with treatment.      Post Infusion Assessment:  Patient tolerated infusion without incident.  Access discontinued per protocol.       Discharge Plan:   Patient discharged in stable condition accompanied by: self.    Jessica Corley RN

## 2020-08-05 NOTE — PROGRESS NOTES
Port needle left accessed for treatment. Tolerated port access and draw without complaint. Port site scrubbed with Chloraprep for 30 seconds and allowed to dry completely prior to dressing application. Accessed using sterile technique. [place] tubes drawn-Red gel/Green/Purple tubes. Double signed by patient and RN. See documentation flowsheet. Ericka Hewitt, RN, BSN, OCN

## 2020-08-05 NOTE — LETTER
8/5/2020         RE: Pradeep Figueroa Jr.  [address] [place] [address]        Dear Colleague,    Thank you for referring your patient, Pradeep Figueroa Jr., to the [facility]. Please see a copy of my visit note below.    Oncology Follow Up Visit: August 5, 2020      Oncologist: Dr Myles Breen  PCP: Feliz Majano    Diagnosis: Pleural Mesothelioma  Pradeep Figueroa Jr.is a 74 yo male with history of epilepsy who presented to clinic in 7/2019 with progressive dysphagia. Difficult work up by GI with initial finding of achalasia. CT chest/abd/pelvis 2/18/20 showed significant circumferential mid-distal esophageal thickening with upstream dilation which was atypical for type II achalasia.  He was found to have an incidental moderate sized left sided pleural effusion   Along with some thickening in the parietal pleural lining and the right lower lobe and also thickening of the pleural lining in the mediastinum around the esophagus.Further endoscopy and EUS showed esophageal stricture with thickening 30 cm from incisors within mediastinum but outside esophageal wall. Biopsies of the specimen showed Loss of BAP1 protein expression that is  consistent   with malignant mesothelioma, epithelioid type based on this limited biopsy.    Treatment:   3/18/2020 began pemetrexed/carboplatin/bevacizumab  6/24/2020 ipilimumab/nivilumab    Interval History: Mr Figueroa is contacted today with continuation of treatment with ipilimumab/nivilumab for his mesothelioma. Pt is concerned about the Gtube site noting more discharge than usual but has noted no new redness irritation swelling bleeding or stomach upset-he did bring in an old gauze to show this provider the discharge.  He otherwise states he is tolerating the feedings well but has cut down 1 carton due to bloating and is not seen a decline in his weight.  He does have loose stools noting 2-3 stools per day with first formed and then loose-uses  Imodium occasionally for control but afraid of constipation-no blood ever seen in the stool.  He has some shortness of breath with activity but it does not feel that is new.  And he has an occasional rough voice but again does not feel this is abnormal for him.  He is using his Lamictal and has had no recent seizures.  Otherwise denies new pain nausea mouth sores fevers weakness or trouble sleeping.   Rest of comprehensive and complete ROS is reviewed and is negative.   Past Medical History:   Diagnosis Date     Mesothelioma (H)      Seizures (H)      Current Outpatient Medications   Medication     acetaminophen (TYLENOL) 325 MG tablet     albuterol (PROAIR HFA/PROVENTIL HFA/VENTOLIN HFA) 108 (90 Base) MCG/ACT inhaler     cholecalciferol 25 MCG (1000 UT) TABS     folic acid (FOLVITE) 1 MG tablet     lamoTRIgine (LAMICTAL) 200 MG tablet     LORazepam (ATIVAN) 0.5 MG tablet     Nutritional Supplements (ISOSOURCE 1.5 DIMA) LIQD     omeprazole (PRILOSEC) 2 mg/mL suspension     oxyCODONE (ROXICODONE) 5 MG tablet     prochlorperazine (COMPAZINE) 10 MG tablet     No current facility-administered medications for this visit.      Facility-Administered Medications Ordered in Other Visits   Medication     heparin 100 UNIT/ML injection 5 mL     No Known Allergies    Physical Exam:/85   Pulse 86   Temp 97.6  F (36.4  C)   Wt 58.2 kg (128 lb 3.2 oz)   SpO2 98%   BMI 21.33 kg/m     ECOG PS- 0-1  Constitutional: Alert, thin, cooperative, and in no distress.   ENT: Eyes bright, no drooling  GI:  PEG site with no redness or swelling.  He does have some discharge at the site but it is small not odorous and I believe it may be a slight bit of leakage from around the site.  Does not appear it is infection and patient was reassured.  RESP: No audible wheeze, cough, or visible cyanosis.  No visible retractions or increased work of breathing.    SKIN: skin clear. No significant rash, abnormal pigmentation or lesions.  NEURO:  Cranial nerves grossly intact.  Mentation and speech appropriate for age.  PSYCH: Mentation appears normal, affect normal/bright, judgement and insight intact, normal speech and appearance well-groomed.    Laboratory Results:   Results for orders placed or performed in visit on 08/05/20   Comprehensive metabolic panel     Status: Abnormal   Result Value Ref Range    Sodium 137 133 - 144 mmol/L    Potassium 4.6 3.4 - 5.3 mmol/L    Chloride 106 94 - 109 mmol/L    Carbon Dioxide 28 20 - 32 mmol/L    Anion Gap 3 3 - 14 mmol/L    Glucose 130 (H) 70 - 99 mg/dL    Urea Nitrogen 23 7 - 30 mg/dL    Creatinine 0.82 0.66 - 1.25 mg/dL    GFR Estimate 85 >60 mL/min/[1.73_m2]    GFR Estimate If Black >90 >60 mL/min/[1.73_m2]    Calcium 9.2 8.5 - 10.1 mg/dL    Bilirubin Total 0.3 0.2 - 1.3 mg/dL    Albumin 3.3 (L) 3.4 - 5.0 g/dL    Protein Total 7.5 6.8 - 8.8 g/dL    Alkaline Phosphatase 64 40 - 150 U/L    ALT 29 0 - 70 U/L    AST 30 0 - 45 U/L   TSH with free T4 reflex     Status: None   Result Value Ref Range    TSH 1.63 0.40 - 4.00 mU/L     Assessment and Plan:    Pleural Mesothelioma- pt continues with treatment with ipilimumab/nivolumab since 6/24/2020 with good tolerance noted however he does share concerns of loose stools but no bleeding-this could be related to immunotherapy but does not show signs of the colitis and believe this to be more related to his tube feedings-asked him to report any blood in stool or increase in frequency of loose stools.  He will continue with cycle 4 today as planned.  He has imaging and review with Dr. Breen in August for review and further planning.  Nutrition through Gtube-PEG tube is working out well for patient for nutritional support with Isosource with very little pleasure eating-Encouraged sour candies frequent sips of water and other things to continue to support saliva and prevent dry mouth.  Patient was worried about G-tube infection however today review shows no sign of infection  and good working order of the G-tube itself reassured patient that some discharge is normal and also educated him on tightening the the tube to prevent leakage.  We will continue to use gauze to the area to prevent irritation around G-tube site.  Regurgitation-  using prilosec solution twice daily-shares the switch to twice daily Prilosec has been helpful for the regurgitation.  Seizure disorder-no recent seizures - continues on Lamictal as previous.  This was a 25 min visit with > 50 % in education and management of concerns.   Rajni Drummond Cnp              Again, thank you for allowing me to participate in the care of your patient.        Sincerely,        Rajni Drummond, ZAIDA, APRN CNP

## 2020-08-12 ENCOUNTER — PATIENT OUTREACH (OUTPATIENT)
Dept: ONCOLOGY | Facility: CLINIC | Age: 75
End: 2020-08-12

## 2020-08-12 DIAGNOSIS — R19.5 LOOSE STOOLS: ICD-10-CM

## 2020-08-12 DIAGNOSIS — C45.0 MESOTHELIOMA (PLEURAL) (H): Primary | ICD-10-CM

## 2020-08-12 RX ORDER — DIPHENOXYLATE HCL/ATROPINE 2.5-.025MG
1 TABLET ORAL 4 TIMES DAILY PRN
Qty: 120 TABLET | Refills: 0 | Status: SHIPPED | OUTPATIENT
Start: 2020-08-12

## 2020-08-12 NOTE — PROGRESS NOTES
Received a call from Nataliia who states that since Yossi started his ipi/nivo, he has had constant diarrhea.  He is maxing out on the imodium per the box instructions and is wondering what they can do next.  Let her know that  has been notified and the likely next step is lomotil.  Let her know the prescription will be called in to their local pharmacy.      Update:  Per , ok to use lomotil.  A script has been sent to his pharmacy.  Let Nataliia know that with all of this diarrhea, she should increase the amount of water he gets to prevent dehydration.  Also advised her to call back if the lomotil doesn't work in 3-4 days.  She agreed with and verbalized understanding of the plan of care.  All of her questions have been answered.

## 2020-08-17 ENCOUNTER — INFUSION THERAPY VISIT (OUTPATIENT)
Dept: INFUSION THERAPY | Facility: CLINIC | Age: 75
End: 2020-08-17
Payer: MEDICARE

## 2020-08-17 ENCOUNTER — MYC MEDICAL ADVICE (OUTPATIENT)
Dept: ONCOLOGY | Facility: CLINIC | Age: 75
End: 2020-08-17

## 2020-08-17 ENCOUNTER — PATIENT OUTREACH (OUTPATIENT)
Dept: ONCOLOGY | Facility: CLINIC | Age: 75
End: 2020-08-17

## 2020-08-17 ENCOUNTER — ONCOLOGY VISIT (OUTPATIENT)
Dept: ONCOLOGY | Facility: CLINIC | Age: 75
End: 2020-08-17
Payer: MEDICARE

## 2020-08-17 ENCOUNTER — ANCILLARY PROCEDURE (OUTPATIENT)
Dept: CT IMAGING | Facility: CLINIC | Age: 75
End: 2020-08-17
Attending: INTERNAL MEDICINE
Payer: MEDICARE

## 2020-08-17 VITALS
SYSTOLIC BLOOD PRESSURE: 111 MMHG | HEART RATE: 95 BPM | TEMPERATURE: 98.3 F | DIASTOLIC BLOOD PRESSURE: 64 MMHG | OXYGEN SATURATION: 95 %

## 2020-08-17 DIAGNOSIS — R11.0 NAUSEA: ICD-10-CM

## 2020-08-17 DIAGNOSIS — C45.0 MESOTHELIOMA (PLEURAL) (H): ICD-10-CM

## 2020-08-17 DIAGNOSIS — R19.7 DIARRHEA, UNSPECIFIED TYPE: ICD-10-CM

## 2020-08-17 DIAGNOSIS — C45.9 MESOTHELIOMA (H): ICD-10-CM

## 2020-08-17 DIAGNOSIS — R19.5 LOOSE STOOLS: Primary | ICD-10-CM

## 2020-08-17 DIAGNOSIS — R19.5 LOOSE STOOLS: ICD-10-CM

## 2020-08-17 DIAGNOSIS — E46 MALNUTRITION, UNSPECIFIED TYPE (H): ICD-10-CM

## 2020-08-17 DIAGNOSIS — R19.7 DIARRHEA, UNSPECIFIED TYPE: Primary | ICD-10-CM

## 2020-08-17 LAB
ANION GAP SERPL CALCULATED.3IONS-SCNC: 4 MMOL/L (ref 3–14)
BUN SERPL-MCNC: 17 MG/DL (ref 7–30)
C DIFF TOX B STL QL: NEGATIVE
CALCIUM SERPL-MCNC: 9.4 MG/DL (ref 8.5–10.1)
CHLORIDE SERPL-SCNC: 104 MMOL/L (ref 94–109)
CO2 SERPL-SCNC: 28 MMOL/L (ref 20–32)
CREAT SERPL-MCNC: 0.73 MG/DL (ref 0.66–1.25)
GFR SERPL CREATININE-BSD FRML MDRD: 89 ML/MIN/{1.73_M2}
GLUCOSE SERPL-MCNC: 109 MG/DL (ref 70–99)
MAGNESIUM SERPL-MCNC: 2.3 MG/DL (ref 1.6–2.3)
POTASSIUM SERPL-SCNC: 4.5 MMOL/L (ref 3.4–5.3)
SODIUM SERPL-SCNC: 136 MMOL/L (ref 133–144)
SPECIMEN SOURCE: NORMAL

## 2020-08-17 PROCEDURE — 87493 C DIFF AMPLIFIED PROBE: CPT | Performed by: NURSE PRACTITIONER

## 2020-08-17 PROCEDURE — 99207 ZZC NO CHARGE NURSE ONLY: CPT

## 2020-08-17 PROCEDURE — 71260 CT THORAX DX C+: CPT | Performed by: RADIOLOGY

## 2020-08-17 PROCEDURE — 74177 CT ABD & PELVIS W/CONTRAST: CPT | Performed by: RADIOLOGY

## 2020-08-17 PROCEDURE — 83735 ASSAY OF MAGNESIUM: CPT | Performed by: NURSE PRACTITIONER

## 2020-08-17 PROCEDURE — 80048 BASIC METABOLIC PNL TOTAL CA: CPT | Performed by: NURSE PRACTITIONER

## 2020-08-17 PROCEDURE — 99214 OFFICE O/P EST MOD 30 MIN: CPT | Mod: 25 | Performed by: NURSE PRACTITIONER

## 2020-08-17 PROCEDURE — 96360 HYDRATION IV INFUSION INIT: CPT | Performed by: NURSE PRACTITIONER

## 2020-08-17 RX ORDER — HEPARIN SODIUM (PORCINE) LOCK FLUSH IV SOLN 100 UNIT/ML 100 UNIT/ML
5 SOLUTION INTRAVENOUS
Status: DISCONTINUED | OUTPATIENT
Start: 2020-08-17 | End: 2020-08-17 | Stop reason: HOSPADM

## 2020-08-17 RX ORDER — HEPARIN SODIUM (PORCINE) LOCK FLUSH IV SOLN 100 UNIT/ML 100 UNIT/ML
5 SOLUTION INTRAVENOUS
Status: CANCELLED | OUTPATIENT
Start: 2020-08-17

## 2020-08-17 RX ORDER — HEPARIN SODIUM,PORCINE 10 UNIT/ML
5 VIAL (ML) INTRAVENOUS
Status: CANCELLED | OUTPATIENT
Start: 2020-08-17

## 2020-08-17 RX ORDER — IOPAMIDOL 755 MG/ML
78 INJECTION, SOLUTION INTRAVASCULAR ONCE
Status: COMPLETED | OUTPATIENT
Start: 2020-08-17 | End: 2020-08-17

## 2020-08-17 RX ADMIN — IOPAMIDOL 78 ML: 755 INJECTION, SOLUTION INTRAVASCULAR at 14:07

## 2020-08-17 RX ADMIN — HEPARIN SODIUM (PORCINE) LOCK FLUSH IV SOLN 100 UNIT/ML 5 ML: 100 SOLUTION at 13:36

## 2020-08-17 RX ADMIN — Medication 1000 ML: at 12:33

## 2020-08-17 ASSESSMENT — PAIN SCALES - GENERAL: PAINLEVEL: NO PAIN (0)

## 2020-08-17 NOTE — PROGRESS NOTES
TC from pt's wife, Nataliia, stating that pt continues to have diarrhea and is wondering if he could try tincture of opium as discussed with Henry as lomotil isn't helping. Asked Nataliia to call their preferred pharmacy to see if they carry it. Inbasket message sent to Dr. Breen and Rajni Drummond to review Rx request. Await response.

## 2020-08-17 NOTE — PROGRESS NOTES
Oncology Follow Up Visit: August 17, 2020      Oncologist: Dr Myles Breen  PCP: Feliz Majano    Diagnosis: Pleural Mesothelioma  Pradeep Figueroa Jr.is a 74 yo male with history of epilepsy who presented to clinic in 7/2019 with progressive dysphagia. Difficult work up by GI with initial finding of achalasia. CT chest/abd/pelvis 2/18/20 showed significant circumferential mid-distal esophageal thickening with upstream dilation which was atypical for type II achalasia.  He was found to have an incidental moderate sized left sided pleural effusion   Along with some thickening in the parietal pleural lining and the right lower lobe and also thickening of the pleural lining in the mediastinum around the esophagus.Further endoscopy and EUS showed esophageal stricture with thickening 30 cm from incisors within mediastinum but outside esophageal wall. Biopsies of the specimen showed Loss of BAP1 protein expression that is  consistent   with malignant mesothelioma, epithelioid type based on this limited biopsy.    Treatment:   3/18/2020 began pemetrexed/carboplatin/bevacizumab  6/24/2020 ipilimumab/nivilumab    Interval History: Mr Figueroa is seen today for complaint of ongoing loose stools and nausea with intolerance to his Gtube feedings as side effects from current treatment with ipilimumab/nivilumab-last treatment with both drugs was 8/5/2020. Wife Nataliia on the phone with visit. Pt started with loose stools 5-6 day 6 days previous. They tried imodium but used only 1 crushed tab 3-4 x daily. They were given lomotil and has been using this over weekend and felt it is not helpful with stools now more watery and just as frequent with pt now noting more bloating with some nausea to point of decreasing intake to 3 cartons of formula through Gtube. Some extra burping noted but no vomiting. He is eating little to nothing per mouth- some sour candies and may eat some of a popsicle. Denies fevers, confusion, abdominal pain  but does have some urgency with stools. Has same SOB with any exertion. He is due to get imaging today. No recent antibiotic use.  Rest of comprehensive and complete ROS is reviewed and is negative.   Past Medical History:   Diagnosis Date     Mesothelioma (H)      Seizures (H)      Current Outpatient Medications   Medication     acetaminophen (TYLENOL) 325 MG tablet     albuterol (PROAIR HFA/PROVENTIL HFA/VENTOLIN HFA) 108 (90 Base) MCG/ACT inhaler     cholecalciferol 25 MCG (1000 UT) TABS     diphenoxylate-atropine (LOMOTIL) 2.5-0.025 MG tablet     folic acid (FOLVITE) 1 MG tablet     lamoTRIgine (LAMICTAL) 200 MG tablet     LORazepam (ATIVAN) 0.5 MG tablet     Nutritional Supplements (ISOSOURCE 1.5 DIMA) LIQD     omeprazole (PRILOSEC) 2 mg/mL suspension     oxyCODONE (ROXICODONE) 5 MG tablet     prochlorperazine (COMPAZINE) 10 MG tablet     No current facility-administered medications for this visit.      Facility-Administered Medications Ordered in Other Visits   Medication     heparin 100 UNIT/ML injection 5 mL     No Known Allergies    Physical Exam:98.3, 95,111/64, 95%  ECOG PS- 1  Constitutional: Alert, thin, cooperative, and in no distress at present as seated.   ENT: Eyes bright, no drooling  GI:  PEG site with no redness or swelling.  Gtube site is clean  RESP: No audible wheeze, cough, or visible cyanosis.  No visible retractions or increased work of breathing.    SKIN: skin clear. No significant rash but has some roughness noted martina T on chest but not pruritic or irritating.  NEURO: Cranial nerves grossly intact.  Mentation and speech appropriate for age.muscles are weak.   PSYCH: Mentation appears normal, affect normal/bright, judgement and insight intact, normal speech and appearance well-groomed.    Laboratory Results:   Results for orders placed or performed in visit on 08/17/20   CT Chest/Abdomen/Pelvis w Contrast     Status: None    Narrative    EXAMINATION: CT CHEST/ABDOMEN/PELVIS W CONTRAST,  8/17/2020 2:13 PM    TECHNIQUE: Helical CT images from the thoracic inlet through the  symphysis pubis were obtained with intravenous contrast. Contrast  dose: 78 ml isovue 370    COMPARISON: CT 6/16/2020 and PET/CT dated 3/10/20,    HISTORY: Mesothelioma (pleural) (H).    FINDINGS:    Lower neck: Left internal jugular lymph node measuring 0.7 cm(3/32).    Chest:   There is a large enhancing soft tissue density mass measuring 1.8 x 3  x 8.1 cm (AP, T, CC) along the distal esophagus located between the  left atrium and descending thoracic aorta causing compression of the  esophagus and proximal dilatation. Inferiorly the mass extends to the  level of the gastroesophageal junction and extends to bilateral  diaphragmatic crux and left parietal pleura (series 3, image 264).  There is moderate pleural effusion, decreased compared to prior study.    Redemonstrated enhancing pleural-based mass in the posterior left  upper lobe along the left fifth rib with extension to the T4-5 neural  foramen (3/67).  Lungs: No suspicious nodules, focal consolidation or mass. No pleural  effusion or pneumothorax.  Mediastinum: No lymphadenopathy by size criteria.  Heart and great vessels: Heart is normal in size and there is no  pericardial effusion. Aorta and main pulmonary artery are within  normal limits in caliber.    Abdomen and pelvis:  Liver: Stable hepatic cysts. No enhancing masses.  Biliary/Gallbladder: No CT evidence of calculi, wall thickening or  pericholecystic fluid. No intra or extrahepatic biliary dilatation.  Spleen: Normal size. No focal lesions.  Pancreas: No evidence of pancreatic mass or peripancreatic fluid.  Adrenals: Normal.  Kidneys: No hydronephrosis, calculi or mass.  Urinary bladder: Unremarkable.  Reproductive organs: Mild prostatomegaly.  Gastrointestinal: The stomach duodenum are unremarkable. Small bowel  is normal in caliber. Mild bowel wall thickening noted in the sigmoid  colon. The appendix is  normal.  Mesentery/Peritoneum: No territorial nodules, ascites or  pneumoperitoneum.  Lymph nodes: No lymphadenopathy.  Vasculature: Mild atherosclerotic calcifications. IVC is normal.   Other: None.     Bones and soft tissues: Visualized bony structures are consistent with  the patient's age.      Impression    IMPRESSION:  1.  Stable mass along the distal esophagus, extending to the bilateral  diaphragmatic crux and left pleura, consistent with known malignant  mesothelioma, with upstream esophageal dilatation. Interval decreased  left pleural effusion.  2.  Stable pleural based mass along the left fifth rib extending to  the left T4-5 neural foramen.  3.  No evidence of metastasis in the abdomen or pelvis.    BRIEN WOOD MD   Results for orders placed or performed in visit on 08/17/20   Magnesium     Status: None   Result Value Ref Range    Magnesium 2.3 1.6 - 2.3 mg/dL   Basic metabolic panel     Status: Abnormal   Result Value Ref Range    Sodium 136 133 - 144 mmol/L    Potassium 4.5 3.4 - 5.3 mmol/L    Chloride 104 94 - 109 mmol/L    Carbon Dioxide 28 20 - 32 mmol/L    Anion Gap 4 3 - 14 mmol/L    Glucose 109 (H) 70 - 99 mg/dL    Urea Nitrogen 17 7 - 30 mg/dL    Creatinine 0.73 0.66 - 1.25 mg/dL    GFR Estimate 89 >60 mL/min/[1.73_m2]    GFR Estimate If Black >90 >60 mL/min/[1.73_m2]    Calcium 9.4 8.5 - 10.1 mg/dL     Assessment and Plan:    Pleural Mesothelioma with diarrhea- Currently in treatment with ipilimumab/nivilumab last given on 8/5/2020. Pt in today with complaint of increased loose stools x 5 days- 5-6 stools daily and now much more watery. Intake is now being affected even though he has Gtube for his nutrition.   He tried imodium but was not at optimal level using 3-4 daily then trialling lomotil 4 tabs daily and not getting improvement. His electrolytes and kidney function remain at good levels but he was given 1 liter IV fluids and asked to change Gtube feedings to smaller amounts more often  with water flushes to keep up hydration. For the diarrhea, suggested going back to the imodium and using 2 tabs 4 x daily until no stools x 12 hours- may restart lomotil only if imodium not working to slow stools after 36-48 hours. Will get stool sample to check on C. Diff.   Continue to use sour candies and sips of water orally to keep mouth moist and saliva flowing.   Pt had CT scan today for review of disease- he will have a virtual visit with Dr Breen this week for review.   This was a 25 min visit with > 50 % in education and management of concerns.   Rajni Drummond,Cnp

## 2020-08-17 NOTE — PROGRESS NOTES
Note from RN that wife stating imodium and lomotil not working to slow stools. Will bring pt in for labs, IV fluids and review of symptoms. [id number]

## 2020-08-17 NOTE — LETTER
8/17/2020         RE: Pradeep Figueroa Jr.  [address] [place] [address]        Dear Colleague,    Thank you for referring your patient, Pradeep Figueroa Jr., to the [facility]. Please see a copy of my visit note below.    Oncology Follow Up Visit: August 17, 2020      Oncologist: Dr Myels Breen  PCP: Feliz Majano    Diagnosis: Pleural Mesothelioma  Pradeep Figueroa Jr.is a 76 yo male with history of epilepsy who presented to clinic in 7/2019 with progressive dysphagia. Difficult work up by GI with initial finding of achalasia. CT chest/abd/pelvis 2/18/20 showed significant circumferential mid-distal esophageal thickening with upstream dilation which was atypical for type II achalasia.  He was found to have an incidental moderate sized left sided pleural effusion   Along with some thickening in the parietal pleural lining and the right lower lobe and also thickening of the pleural lining in the mediastinum around the esophagus.Further endoscopy and EUS showed esophageal stricture with thickening 30 cm from incisors within mediastinum but outside esophageal wall. Biopsies of the specimen showed Loss of BAP1 protein expression that is  consistent   with malignant mesothelioma, epithelioid type based on this limited biopsy.    Treatment:   3/18/2020 began pemetrexed/carboplatin/bevacizumab  6/24/2020 ipilimumab/nivilumab    Interval History: Mr Figueroa is seen today for complaint of ongoing loose stools and nausea with intolerance to his Gtube feedings as side effects from current treatment with ipilimumab/nivilumab-last treatment with both drugs was 8/5/2020. Wife Nataliia on the phone with visit. Pt started with loose stools 5-6 day 6 days previous. They tried imodium but used only 1 crushed tab 3-4 x daily. They were given lomotil and has been using this over weekend and felt it is not helpful with stools now more watery and just as frequent with pt now noting more bloating with  some nausea to point of decreasing intake to 3 cartons of formula through Gtube. Some extra burping noted but no vomiting. He is eating little to nothing per mouth- some sour candies and may eat some of a popsicle. Denies fevers, confusion, abdominal pain but does have some urgency with stools. Has same SOB with any exertion. He is due to get imaging today. No recent antibiotic use.  Rest of comprehensive and complete ROS is reviewed and is negative.   Past Medical History:   Diagnosis Date     Mesothelioma (H)      Seizures (H)      Current Outpatient Medications   Medication     acetaminophen (TYLENOL) 325 MG tablet     albuterol (PROAIR HFA/PROVENTIL HFA/VENTOLIN HFA) 108 (90 Base) MCG/ACT inhaler     cholecalciferol 25 MCG (1000 UT) TABS     diphenoxylate-atropine (LOMOTIL) 2.5-0.025 MG tablet     folic acid (FOLVITE) 1 MG tablet     lamoTRIgine (LAMICTAL) 200 MG tablet     LORazepam (ATIVAN) 0.5 MG tablet     Nutritional Supplements (ISOSOURCE 1.5 DIMA) LIQD     omeprazole (PRILOSEC) 2 mg/mL suspension     oxyCODONE (ROXICODONE) 5 MG tablet     prochlorperazine (COMPAZINE) 10 MG tablet     No current facility-administered medications for this visit.      Facility-Administered Medications Ordered in Other Visits   Medication     heparin 100 UNIT/ML injection 5 mL     No Known Allergies    Physical Exam:98.3, 95,111/64, 95%  ECOG PS- 1  Constitutional: Alert, thin, cooperative, and in no distress at present as seated.   ENT: Eyes bright, no drooling  GI:  PEG site with no redness or swelling.  Gtube site is clean  RESP: No audible wheeze, cough, or visible cyanosis.  No visible retractions or increased work of breathing.    SKIN: skin clear. No significant rash but has some roughness noted martina T on chest but not pruritic or irritating.  NEURO: Cranial nerves grossly intact.  Mentation and speech appropriate for age.muscles are weak.   PSYCH: Mentation appears normal, affect normal/bright, judgement and insight  intact, normal speech and appearance well-groomed.    Laboratory Results:   Results for orders placed or performed in visit on 08/17/20   CT Chest/Abdomen/Pelvis w Contrast     Status: None    Narrative    EXAMINATION: CT CHEST/ABDOMEN/PELVIS W CONTRAST, 8/17/2020 2:13 PM    TECHNIQUE: Helical CT images from the thoracic inlet through the  symphysis pubis were obtained with intravenous contrast. Contrast  dose: 78 ml isovue 370    COMPARISON: CT 6/16/2020 and PET/CT dated 3/10/20,    HISTORY: Mesothelioma (pleural) (H).    FINDINGS:    Lower neck: Left internal jugular lymph node measuring 0.7 cm(3/32).    Chest:   There is a large enhancing soft tissue density mass measuring 1.8 x 3  x 8.1 cm (AP, T, CC) along the distal esophagus located between the  left atrium and descending thoracic aorta causing compression of the  esophagus and proximal dilatation. Inferiorly the mass extends to the  level of the gastroesophageal junction and extends to bilateral  diaphragmatic crux and left parietal pleura (series 3, image 264).  There is moderate pleural effusion, decreased compared to prior study.    Redemonstrated enhancing pleural-based mass in the posterior left  upper lobe along the left fifth rib with extension to the T4-5 neural  foramen (3/67).  Lungs: No suspicious nodules, focal consolidation or mass. No pleural  effusion or pneumothorax.  Mediastinum: No lymphadenopathy by size criteria.  Heart and great vessels: Heart is normal in size and there is no  pericardial effusion. Aorta and main pulmonary artery are within  normal limits in caliber.    Abdomen and pelvis:  Liver: Stable hepatic cysts. No enhancing masses.  Biliary/Gallbladder: No CT evidence of calculi, wall thickening or  pericholecystic fluid. No intra or extrahepatic biliary dilatation.  Spleen: Normal size. No focal lesions.  Pancreas: No evidence of pancreatic mass or peripancreatic fluid.  Adrenals: Normal.  Kidneys: No hydronephrosis, calculi or  mass.  Urinary bladder: Unremarkable.  Reproductive organs: Mild prostatomegaly.  Gastrointestinal: The stomach duodenum are unremarkable. Small bowel  is normal in caliber. Mild bowel wall thickening noted in the sigmoid  colon. The appendix is normal.  Mesentery/Peritoneum: No territorial nodules, ascites or  pneumoperitoneum.  Lymph nodes: No lymphadenopathy.  Vasculature: Mild atherosclerotic calcifications. IVC is normal.   Other: None.     Bones and soft tissues: Visualized bony structures are consistent with  the patient's age.      Impression    IMPRESSION:  1.  Stable mass along the distal esophagus, extending to the bilateral  diaphragmatic crux and left pleura, consistent with known malignant  mesothelioma, with upstream esophageal dilatation. Interval decreased  left pleural effusion.  2.  Stable pleural based mass along the left fifth rib extending to  the left T4-5 neural foramen.  3.  No evidence of metastasis in the abdomen or pelvis.    BRIEN WOOD MD   Results for orders placed or performed in visit on 08/17/20   Magnesium     Status: None   Result Value Ref Range    Magnesium 2.3 1.6 - 2.3 mg/dL   Basic metabolic panel     Status: Abnormal   Result Value Ref Range    Sodium 136 133 - 144 mmol/L    Potassium 4.5 3.4 - 5.3 mmol/L    Chloride 104 94 - 109 mmol/L    Carbon Dioxide 28 20 - 32 mmol/L    Anion Gap 4 3 - 14 mmol/L    Glucose 109 (H) 70 - 99 mg/dL    Urea Nitrogen 17 7 - 30 mg/dL    Creatinine 0.73 0.66 - 1.25 mg/dL    GFR Estimate 89 >60 mL/min/[1.73_m2]    GFR Estimate If Black >90 >60 mL/min/[1.73_m2]    Calcium 9.4 8.5 - 10.1 mg/dL     Assessment and Plan:    Pleural Mesothelioma with diarrhea- Currently in treatment with ipilimumab/nivilumab last given on 8/5/2020. Pt in today with complaint of increased loose stools x 5 days- 5-6 stools daily and now much more watery. Intake is now being affected even though he has Gtube for his nutrition.   He tried imodium but was not at optimal  level using 3-4 daily then trialling lomotil 4 tabs daily and not getting improvement. His electrolytes and kidney function remain at good levels but he was given 1 liter IV fluids and asked to change Gtube feedings to smaller amounts more often with water flushes to keep up hydration. For the diarrhea, suggested going back to the imodium and using 2 tabs 4 x daily until no stools x 12 hours- may restart lomotil only if imodium not working to slow stools after 36-48 hours. Will get stool sample to check on C. Diff.   Continue to use sour candies and sips of water orally to keep mouth moist and saliva flowing.   Pt had CT scan today for review of disease- he will have a virtual visit with Dr Breen this week for review.   This was a 25 min visit with > 50 % in education and management of concerns.   Rajni Drummond Cnp      Again, thank you for allowing me to participate in the care of your patient.        Sincerely,        Rajni Drummond, ZAIDA, APRN CNP

## 2020-08-17 NOTE — PROGRESS NOTES
Infusion Nursing Note:  Pradeep LYRIC Figueroa Jr. presents today for IVF.    Patient seen by provider today: Yes: Rajni Drummond NP   present during visit today: Not Applicable.    Note: Pt admits to 5-6 loose stools/day for last few days. Pt given a specimen cup and collection device for a stool sample.  Pt will collect at home and return to the lab.  See flow sheet for assessment.    Intravenous Access:  Implanted Port.    Treatment Conditions:  Not Applicable.      Post Infusion Assessment:  Patient tolerated infusion without incident.  Blood return noted pre and post infusion.  Site patent and intact, free from redness, edema or discomfort.  No evidence of extravasations.  Access left in for pts CT scan that he had following infusion.       Discharge Plan:   Patient discharged in stable condition accompanied by: self.  Departure Mode: Ambulatory.  Pt will do a video visit with Dr Breen on 8/19/20.    Sean Lindsay RN

## 2020-08-18 ENCOUNTER — TELEPHONE (OUTPATIENT)
Dept: NUTRITION | Facility: CLINIC | Age: 75
End: 2020-08-18

## 2020-08-18 ENCOUNTER — HOME INFUSION (PRE-WILLOW HOME INFUSION) (OUTPATIENT)
Dept: PHARMACY | Facility: CLINIC | Age: 75
End: 2020-08-18

## 2020-08-18 NOTE — PROGRESS NOTES
Nutrition Services:      Received phone call from Yossi's wife, Jennifer, today inquiring about alternative formulas for Yossi as he has been having uncontrolled diarrhea.  He has tried altering the volume, duration and MOA as well as medications which have not provided relief.     Yossi has been able to tolerate only 3/6 cartons/day of his Isosource 1.5 shadi formula.  He has been having bloating, gas, pressure, nausea and diarrhea with each feeding.  He takes 1 carton via gravity feedings, lasting up to 1 hour.      RECOMMENDATIONS:  Enteral Nutrition   Formula: [employer] Peptide 1.5 shadi  Volume: 3 1/2 cartons/day (1137mL, ~770 ml free water)  Provisions:  1705kcal (30kcal/kg), 72 g protein (1.2g/kg), 157g CHO, 10g fiber        Suggested Formula transition  Day 1: 1/2 carton formula TID spread 3-4 hours apart   Day 2: 1 carton formula TID spread 3-4 hours apart   Day 3:  1 carton formula TID; plus add'l 1/2 carton as 4th feeding   spread 3-4 hours apart   Flush with 60mL water before and after each feeding (360ml)  Flush with additional 120 mL water 6 x/day (1860L/day)     Weight trends:   Wt Readings from Last 7 Encounters:   08/05/20 58.2 kg (128 lb 3.2 oz)   08/05/20 58.2 kg (128 lb 3.2 oz)   07/22/20 58.8 kg (129 lb 9.6 oz)   07/08/20 59.2 kg (130 lb 9.6 oz)   06/24/20 58 kg (127 lb 14.4 oz)   06/17/20 54.4 kg (120 lb)   05/28/20 56.4 kg (124 lb 4.8 oz)     --Collaboration with [facility] requesting change of formula pending insurance coverage.  If insurance does not cover this formula and he is able to tolerate it, his wife expressed that they will pay OOP for the formula.    --Collaboration with [employer] company who will send a free sample case to try if insurance does not cover.     Follow-up /monitoring:  - EN tolerance/intake  - Weight trends  - Labs/electrolytes    Magaly Breaux RDN, LD  [facility]  [phone]

## 2020-08-19 ENCOUNTER — HOME INFUSION (PRE-WILLOW HOME INFUSION) (OUTPATIENT)
Dept: PHARMACY | Facility: CLINIC | Age: 75
End: 2020-08-19

## 2020-08-19 ENCOUNTER — VIRTUAL VISIT (OUTPATIENT)
Dept: ONCOLOGY | Facility: CLINIC | Age: 75
End: 2020-08-19
Attending: INTERNAL MEDICINE
Payer: MEDICARE

## 2020-08-19 DIAGNOSIS — C45.0 MESOTHELIOMA (PLEURAL) (H): ICD-10-CM

## 2020-08-19 PROCEDURE — 99214 OFFICE O/P EST MOD 30 MIN: CPT | Mod: 95 | Performed by: INTERNAL MEDICINE

## 2020-08-19 PROCEDURE — 40001009 ZZH VIDEO/TELEPHONE VISIT; NO CHARGE

## 2020-08-19 RX ORDER — PREDNISONE 10 MG/1
TABLET ORAL
Qty: 40 TABLET | Refills: 3 | Status: SHIPPED | OUTPATIENT
Start: 2020-08-19 | End: 2020-09-16

## 2020-08-19 NOTE — LETTER
"8/19/2020     RE: Pradeep Figueroa Jr.  [address] [place] [address]    Dear Colleague,    Thank you for referring your patient, Pradeep Figueroa Jr., to the [facility]. Please see a copy of my visit note below.    Pradeep Figueroa Jr. is a 75 year old male who is being evaluated via a billable video visit.      The patient has been notified of following:     \"This video visit will be conducted via a call between you and your physician/provider. We have found that certain health care needs can be provided without the need for an in-person physical exam.  This service lets us provide the care you need with a video conversation.  If a prescription is necessary we can send it directly to your pharmacy.  If lab work is needed we can place an order for that and you can then stop by our lab to have the test done at a later time.    Video visits are billed at different rates depending on your insurance coverage.  Please reach out to your insurance provider with any questions.    If during the course of the call the physician/provider feels a video visit is not appropriate, you will not be charged for this service.\"    Patient has given verbal consent for Video visit? Yes    How would you like to obtain your AVS? MyChart     If you are dropped from the video visit, the video invite should be resent to: Text to cell phone: [phone]     Will anyone else be joining your video visit? No      Vitals - Patient Reported  Weight (Patient Reported): 54.4 kg (120 lb)  Height (Patient Reported): 165.1 cm (5' 5\")  BMI (Based on Pt Reported Ht/Wt): 19.97  Pain Score: No Pain (0)    Cody Jenkins LPN    Video-Visit Details    Type of service:  Video Visit    Video Start Time: 7:25  Video End Time: 7:57 AM    Originating Location (pt. Location): Home    Distant Location (provider location):  [facility]     Platform used for Video Visit: Archie Breen, " "MD    VIDEO VISIT       CHIEF COMPLAINT:  Mesothelioma.      HISTORY OF PRESENT ILLNESS:  The patient returns to clinic today to discuss further therapy.  This was a video visit.  The patient is a 75-year-old gentleman with a history of pleural mesothelioma.  He had presented in 07/2019 with progressive dysphagia.  Evaluation, including a CT of the chest, abdomen and pelvis in 02/2020 showed a circumferential distal esophageal thickening with upstream dilation.  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        The patient started treatment with carboplatin, pemetrexed and bevacizumab in 03/2020.  In 06/2020, we switched to ipilimumab and nivolumab.  The patient has been on this treatment now for approximately 6 weeks.  CT scan shows stable disease with decreased in his left pleural effusion.  There is no right-sided pleural effusion.        The patient is having significant diarrhea with up to 7-8 stools per day with his tube feedings.  He is trying Imodium with limited success.  I discussed with the patient and his wife various strategies for addressing this.  Stool was negative for pathogens or Clostridium difficile.  I told him that we would stop the immunotherapy at this point and I would try another short course of steroids.  I prescribed prednisone 20 mg p.o. daily for 1 week, followed by 10 mg a day for 1 week.  They are also going to try a different formulation of enteral feedings.  The patient has lost 3 pounds over the last several weeks.  His electrolytes are absolutely normal.      I would like to restart the ipilimumab and nivolumab if the diarrhea is under control.  It is not clear to me whether the diarrhea as immune-related colitis or more likely related to his tube feedings.      REVIEW OF SYSTEMS:  A 10-point review of systems is positive for the diarrhea as noted above.  The patient also has some \"bloating\" when he takes several cans of enteral feedings.  The patient had 1 " episode of emesis with very minimal fluid brought up.  Otherwise, a 10-point review of systems is noncontributory.  The patient does note easy fatigability.      LABORATORY:  Basic metabolic panel is entirely normal including electrolytes.  CBC was not done, and I will have this done before our next visit. CBC RESULTS:   Recent Labs   Lab Test 06/24/20  1303   WBC 5.7   RBC 4.12*   HGB 12.1*   HCT 37.8*   MCV 92   MCH 29.4   MCHC 32.0   RDW 22.9*        Last Comprehensive Metabolic Panel:  Sodium   Date Value Ref Range Status   08/17/2020 136 133 - 144 mmol/L Final     Potassium   Date Value Ref Range Status   08/17/2020 4.5 3.4 - 5.3 mmol/L Final     Chloride   Date Value Ref Range Status   08/17/2020 104 94 - 109 mmol/L Final     Carbon Dioxide   Date Value Ref Range Status   08/17/2020 28 20 - 32 mmol/L Final     Anion Gap   Date Value Ref Range Status   08/17/2020 4 3 - 14 mmol/L Final     Glucose   Date Value Ref Range Status   08/17/2020 109 (H) 70 - 99 mg/dL Final     Urea Nitrogen   Date Value Ref Range Status   08/17/2020 17 7 - 30 mg/dL Final     Creatinine   Date Value Ref Range Status   08/17/2020 0.73 0.66 - 1.25 mg/dL Final     GFR Estimate   Date Value Ref Range Status   08/17/2020 89 >60 mL/min/[1.73_m2] Final     Comment:     Non  GFR Calc  Starting 12/18/2018, serum creatinine based estimated GFR (eGFR) will be   calculated using the Chronic Kidney Disease Epidemiology Collaboration   (CKD-EPI) equation.       Calcium   Date Value Ref Range Status   08/17/2020 9.4 8.5 - 10.1 mg/dL Final     Bilirubin Total   Date Value Ref Range Status   08/05/2020 0.3 0.2 - 1.3 mg/dL Final     Alkaline Phosphatase   Date Value Ref Range Status   08/05/2020 64 40 - 150 U/L Final     ALT   Date Value Ref Range Status   08/05/2020 29 0 - 70 U/L Final     AST   Date Value Ref Range Status   08/05/2020 30 0 - 45 U/L Final     Comment:     Specimen is hemolyzed which can falsely elevate AST.  Analysis of a   non-hemolyzed specimen may result in a lower value.        IMAGING:  CT is as described above.  There is no right pleural effusion.  There is a decreased, but still present small left pleural effusion.  The main tumor mass has not shrunk or grown appreciably.   Recent Results (from the past 744 hour(s))   CT Chest/Abdomen/Pelvis w Contrast    Narrative    EXAMINATION: CT CHEST/ABDOMEN/PELVIS W CONTRAST, 8/17/2020 2:13 PM    TECHNIQUE: Helical CT images from the thoracic inlet through the  symphysis pubis were obtained with intravenous contrast. Contrast  dose: 78 ml isovue 370    COMPARISON: CT 6/16/2020 and PET/CT dated 3/10/20,    HISTORY: Mesothelioma (pleural) (H).    FINDINGS:    Lower neck: Left internal jugular lymph node measuring 0.7 cm(3/32).    Chest:   There is a large enhancing soft tissue density mass measuring 1.8 x 3  x 8.1 cm (AP, T, CC) along the distal esophagus located between the  left atrium and descending thoracic aorta causing compression of the  esophagus and proximal dilatation. Inferiorly the mass extends to the  level of the gastroesophageal junction and extends to bilateral  diaphragmatic crux and left parietal pleura (series 3, image 264).  There is moderate pleural effusion, decreased compared to prior study.    Redemonstrated enhancing pleural-based mass in the posterior left  upper lobe along the left fifth rib with extension to the T4-5 neural  foramen (3/67).  Lungs: No suspicious nodules, focal consolidation or mass. No pleural  effusion or pneumothorax.  Mediastinum: No lymphadenopathy by size criteria.  Heart and great vessels: Heart is normal in size and there is no  pericardial effusion. Aorta and main pulmonary artery are within  normal limits in caliber.    Abdomen and pelvis:  Liver: Stable hepatic cysts. No enhancing masses.  Biliary/Gallbladder: No CT evidence of calculi, wall thickening or  pericholecystic fluid. No intra or extrahepatic biliary  dilatation.  Spleen: Normal size. No focal lesions.  Pancreas: No evidence of pancreatic mass or peripancreatic fluid.  Adrenals: Normal.  Kidneys: No hydronephrosis, calculi or mass.  Urinary bladder: Unremarkable.  Reproductive organs: Mild prostatomegaly.  Gastrointestinal: The stomach duodenum are unremarkable. Small bowel  is normal in caliber. Mild bowel wall thickening noted in the sigmoid  colon. The appendix is normal.  Mesentery/Peritoneum: No territorial nodules, ascites or  pneumoperitoneum.  Lymph nodes: No lymphadenopathy.  Vasculature: Mild atherosclerotic calcifications. IVC is normal.   Other: None.     Bones and soft tissues: Visualized bony structures are consistent with  the patient's age.      Impression    IMPRESSION:  1.  Stable mass along the distal esophagus, extending to the bilateral  diaphragmatic crux and left pleura, consistent with known malignant  mesothelioma, with upstream esophageal dilatation. Interval decreased  left pleural effusion.  2.  Stable pleural based mass along the left fifth rib extending to  the left T4-5 neural foramen.  3.  No evidence of metastasis in the abdomen or pelvis.    BRIEN WOOD MD      MED:  Current Outpatient Medications   Medication     acetaminophen (TYLENOL) 325 MG tablet     albuterol (PROAIR HFA/PROVENTIL HFA/VENTOLIN HFA) 108 (90 Base) MCG/ACT inhaler     diphenoxylate-atropine (LOMOTIL) 2.5-0.025 MG tablet     folic acid (FOLVITE) 1 MG tablet     LORazepam (ATIVAN) 0.5 MG tablet     Nutritional Supplements (ISOSOURCE 1.5 DIMA) LIQD     omeprazole (PRILOSEC) 2 mg/mL suspension     predniSONE (DELTASONE) 10 MG tablet     cholecalciferol 25 MCG (1000 UT) TABS     lamoTRIgine (LAMICTAL) 200 MG tablet     oxyCODONE (ROXICODONE) 5 MG tablet     prochlorperazine (COMPAZINE) 10 MG tablet     No current facility-administered medications for this visit.      Facility-Administered Medications Ordered in Other Visits   Medication     heparin 100 UNIT/ML  injection 5 mL     PHYSICAL EXAMINATION:  The patient looks well, but thin.  He is sitting upright and talks clearly.  He is oriented x3.      ASSESSMENT AND PLAN:     1.  Pleural mesothelioma.  The patient has disease stabilization of the ipilimumab and nivolumab.  However, we have only seen a decrease in the left pleural effusion and no other significant diminishment of his disease.  However, I would like to continue this treatment as it has recently been shown to be superior to chemotherapy for mesothelioma.   2.  Diarrhea.  The patient's diarrhea does not appear to be infectious.  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  I have prescribed a short course of prednisone and I will see the patient back in 2 weeks.  The patient will also try a different formulation of enteral feedings starting today     Again, thank you for allowing me to participate in the care of your patient.      Sincerely,    Myles Breen MD    cc:     Rajni Drummond, APRN, CNP    [facility]   [address] [place] [address]

## 2020-08-19 NOTE — PROGRESS NOTES
"Pradeep Figueroa Jr. is a 75 year old male who is being evaluated via a billable video visit.      The patient has been notified of following:     \"This video visit will be conducted via a call between you and your physician/provider. We have found that certain health care needs can be provided without the need for an in-person physical exam.  This service lets us provide the care you need with a video conversation.  If a prescription is necessary we can send it directly to your pharmacy.  If lab work is needed we can place an order for that and you can then stop by our lab to have the test done at a later time.    Video visits are billed at different rates depending on your insurance coverage.  Please reach out to your insurance provider with any questions.    If during the course of the call the physician/provider feels a video visit is not appropriate, you will not be charged for this service.\"    Patient has given verbal consent for Video visit? Yes    How would you like to obtain your AVS? MyChart     If you are dropped from the video visit, the video invite should be resent to: Text to cell phone: [phone]     Will anyone else be joining your video visit? No      Vitals - Patient Reported  Weight (Patient Reported): 54.4 kg (120 lb)  Height (Patient Reported): 165.1 cm (5' 5\")  BMI (Based on Pt Reported Ht/Wt): 19.97  Pain Score: No Pain (0)    Cody Jenkins LPN    Video-Visit Details    Type of service:  Video Visit    Video Start Time: 7:25  Video End Time: 7:57 AM    Originating Location (pt. Location): Home    Distant Location (provider location):  [facility]     Platform used for Video Visit: Joshua Breen MD    VIDEO VISIT       CHIEF COMPLAINT:  Mesothelioma.      HISTORY OF PRESENT ILLNESS:  The patient returns to clinic today to discuss further therapy.  This was a video visit.  The patient is a 75-year-old gentleman with a history of pleural mesothelioma. " " He had presented in 07/2019 with progressive dysphagia.  Evaluation, including a CT of the chest, abdomen and pelvis in 02/2020 showed a circumferential distal esophageal thickening with upstream dilation.  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        The patient started treatment with carboplatin, pemetrexed and bevacizumab in 03/2020.  In 06/2020, we switched to ipilimumab and nivolumab.  The patient has been on this treatment now for approximately 6 weeks.  CT scan shows stable disease with decreased in his left pleural effusion.  There is no right-sided pleural effusion.        The patient is having significant diarrhea with up to 7-8 stools per day with his tube feedings.  He is trying Imodium with limited success.  I discussed with the patient and his wife various strategies for addressing this.  Stool was negative for pathogens or Clostridium difficile.  I told him that we would stop the immunotherapy at this point and I would try another short course of steroids.  I prescribed prednisone 20 mg p.o. daily for 1 week, followed by 10 mg a day for 1 week.  They are also going to try a different formulation of enteral feedings.  The patient has lost 3 pounds over the last several weeks.  His electrolytes are absolutely normal.      I would like to restart the ipilimumab and nivolumab if the diarrhea is under control.  It is not clear to me whether the diarrhea as immune-related colitis or more likely related to his tube feedings.      REVIEW OF SYSTEMS:  A 10-point review of systems is positive for the diarrhea as noted above.  The patient also has some \"bloating\" when he takes several cans of enteral feedings.  The patient had 1 episode of emesis with very minimal fluid brought up.  Otherwise, a 10-point review of systems is noncontributory.  The patient does note easy fatigability.      LABORATORY:  Basic metabolic panel is entirely normal including electrolytes.  CBC was not done, " and I will have this done before our next visit. CBC RESULTS:   Recent Labs   Lab Test 06/24/20  1303   WBC 5.7   RBC 4.12*   HGB 12.1*   HCT 37.8*   MCV 92   MCH 29.4   MCHC 32.0   RDW 22.9*        Last Comprehensive Metabolic Panel:  Sodium   Date Value Ref Range Status   08/17/2020 136 133 - 144 mmol/L Final     Potassium   Date Value Ref Range Status   08/17/2020 4.5 3.4 - 5.3 mmol/L Final     Chloride   Date Value Ref Range Status   08/17/2020 104 94 - 109 mmol/L Final     Carbon Dioxide   Date Value Ref Range Status   08/17/2020 28 20 - 32 mmol/L Final     Anion Gap   Date Value Ref Range Status   08/17/2020 4 3 - 14 mmol/L Final     Glucose   Date Value Ref Range Status   08/17/2020 109 (H) 70 - 99 mg/dL Final     Urea Nitrogen   Date Value Ref Range Status   08/17/2020 17 7 - 30 mg/dL Final     Creatinine   Date Value Ref Range Status   08/17/2020 0.73 0.66 - 1.25 mg/dL Final     GFR Estimate   Date Value Ref Range Status   08/17/2020 89 >60 mL/min/[1.73_m2] Final     Comment:     Non  GFR Calc  Starting 12/18/2018, serum creatinine based estimated GFR (eGFR) will be   calculated using the Chronic Kidney Disease Epidemiology Collaboration   (CKD-EPI) equation.       Calcium   Date Value Ref Range Status   08/17/2020 9.4 8.5 - 10.1 mg/dL Final     Bilirubin Total   Date Value Ref Range Status   08/05/2020 0.3 0.2 - 1.3 mg/dL Final     Alkaline Phosphatase   Date Value Ref Range Status   08/05/2020 64 40 - 150 U/L Final     ALT   Date Value Ref Range Status   08/05/2020 29 0 - 70 U/L Final     AST   Date Value Ref Range Status   08/05/2020 30 0 - 45 U/L Final     Comment:     Specimen is hemolyzed which can falsely elevate AST. Analysis of a   non-hemolyzed specimen may result in a lower value.        IMAGING:  CT is as described above.  There is no right pleural effusion.  There is a decreased, but still present small left pleural effusion.  The main tumor mass has not shrunk or grown  appreciably.   Recent Results (from the past 744 hour(s))   CT Chest/Abdomen/Pelvis w Contrast    Narrative    EXAMINATION: CT CHEST/ABDOMEN/PELVIS W CONTRAST, 8/17/2020 2:13 PM    TECHNIQUE: Helical CT images from the thoracic inlet through the  symphysis pubis were obtained with intravenous contrast. Contrast  dose: 78 ml isovue 370    COMPARISON: CT 6/16/2020 and PET/CT dated 3/10/20,    HISTORY: Mesothelioma (pleural) (H).    FINDINGS:    Lower neck: Left internal jugular lymph node measuring 0.7 cm(3/32).    Chest:   There is a large enhancing soft tissue density mass measuring 1.8 x 3  x 8.1 cm (AP, T, CC) along the distal esophagus located between the  left atrium and descending thoracic aorta causing compression of the  esophagus and proximal dilatation. Inferiorly the mass extends to the  level of the gastroesophageal junction and extends to bilateral  diaphragmatic crux and left parietal pleura (series 3, image 264).  There is moderate pleural effusion, decreased compared to prior study.    Redemonstrated enhancing pleural-based mass in the posterior left  upper lobe along the left fifth rib with extension to the T4-5 neural  foramen (3/67).  Lungs: No suspicious nodules, focal consolidation or mass. No pleural  effusion or pneumothorax.  Mediastinum: No lymphadenopathy by size criteria.  Heart and great vessels: Heart is normal in size and there is no  pericardial effusion. Aorta and main pulmonary artery are within  normal limits in caliber.    Abdomen and pelvis:  Liver: Stable hepatic cysts. No enhancing masses.  Biliary/Gallbladder: No CT evidence of calculi, wall thickening or  pericholecystic fluid. No intra or extrahepatic biliary dilatation.  Spleen: Normal size. No focal lesions.  Pancreas: No evidence of pancreatic mass or peripancreatic fluid.  Adrenals: Normal.  Kidneys: No hydronephrosis, calculi or mass.  Urinary bladder: Unremarkable.  Reproductive organs: Mild  prostatomegaly.  Gastrointestinal: The stomach duodenum are unremarkable. Small bowel  is normal in caliber. Mild bowel wall thickening noted in the sigmoid  colon. The appendix is normal.  Mesentery/Peritoneum: No territorial nodules, ascites or  pneumoperitoneum.  Lymph nodes: No lymphadenopathy.  Vasculature: Mild atherosclerotic calcifications. IVC is normal.   Other: None.     Bones and soft tissues: Visualized bony structures are consistent with  the patient's age.      Impression    IMPRESSION:  1.  Stable mass along the distal esophagus, extending to the bilateral  diaphragmatic crux and left pleura, consistent with known malignant  mesothelioma, with upstream esophageal dilatation. Interval decreased  left pleural effusion.  2.  Stable pleural based mass along the left fifth rib extending to  the left T4-5 neural foramen.  3.  No evidence of metastasis in the abdomen or pelvis.    BRIEN WOOD MD      MED:  Current Outpatient Medications   Medication     acetaminophen (TYLENOL) 325 MG tablet     albuterol (PROAIR HFA/PROVENTIL HFA/VENTOLIN HFA) 108 (90 Base) MCG/ACT inhaler     diphenoxylate-atropine (LOMOTIL) 2.5-0.025 MG tablet     folic acid (FOLVITE) 1 MG tablet     LORazepam (ATIVAN) 0.5 MG tablet     Nutritional Supplements (ISOSOURCE 1.5 DIMA) LIQD     omeprazole (PRILOSEC) 2 mg/mL suspension     predniSONE (DELTASONE) 10 MG tablet     cholecalciferol 25 MCG (1000 UT) TABS     lamoTRIgine (LAMICTAL) 200 MG tablet     oxyCODONE (ROXICODONE) 5 MG tablet     prochlorperazine (COMPAZINE) 10 MG tablet     No current facility-administered medications for this visit.      Facility-Administered Medications Ordered in Other Visits   Medication     heparin 100 UNIT/ML injection 5 mL     PHYSICAL EXAMINATION:  The patient looks well, but thin.  He is sitting upright and talks clearly.  He is oriented x3.      ASSESSMENT AND PLAN:     1.  Pleural mesothelioma.  The patient has disease stabilization of the  ipilimumab and nivolumab.  However, we have only seen a decrease in the left pleural effusion and no other significant diminishment of his disease.  However, I would like to continue this treatment as it has recently been shown to be superior to chemotherapy for mesothelioma.   2.  Diarrhea.  The patient's diarrhea does not appear to be infectious.  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  I have prescribed a short course of prednisone and I will see the patient back in 2 weeks.  The patient will also try a different formulation of enteral feedings starting today      cc:     Rajni Drummond, APRN, CNP    [facility]   [address] [place] [address]

## 2020-08-20 ENCOUNTER — TELEPHONE (OUTPATIENT)
Dept: NUTRITION | Facility: CLINIC | Age: 75
End: 2020-08-20

## 2020-08-20 ENCOUNTER — HOME INFUSION (PRE-WILLOW HOME INFUSION) (OUTPATIENT)
Dept: PHARMACY | Facility: CLINIC | Age: 75
End: 2020-08-20

## 2020-08-20 NOTE — TELEPHONE ENCOUNTER
Nutrition services:    Received a phone call from Jennifer today indicating that Yossi is still struggling with tolerance to his tube feedings.    She tells me that he has been getting ~1/3-2/3s cup of formula every 3 hours.  He either has fullness or vomiting.  She has been infusing this volume in <10 minutes, followed-up by 1 cup of water in less than a few minutes.  She has large bore bags and has the roller clamp as slow as it can go.      Recommendations:  --Slow feedings down to >20 minutes; will send small bore bags (I notified)  --Take feedings every 4 hours and not sooner  --Reduce water to 120mL after feedings, slowing flush to >10 minutes     RD will follow-up with Jennifer and Yossi in 1-2 days.     Magaly Breaux RD, [facility] [place]  [phone]

## 2020-08-20 NOTE — PROGRESS NOTES
This is a recent snapshot of the patient's [facility] medical record.  For current drug dose and complete information and questions, call [phone] or In Basket pool, [facility] home infusion ([id number])  CSN Number:  [id number]

## 2020-08-21 DIAGNOSIS — C45.0 MESOTHELIOMA (PLEURAL) (H): ICD-10-CM

## 2020-08-21 RX ORDER — ALBUTEROL SULFATE 90 UG/1
2 AEROSOL, METERED RESPIRATORY (INHALATION) EVERY 6 HOURS PRN
Qty: 1 INHALER | Refills: 1 | Status: SHIPPED | OUTPATIENT
Start: 2020-08-21 | End: 2020-10-13

## 2020-08-21 NOTE — PROGRESS NOTES
This is a recent snapshot of the patient's [facility] medical record.  For current drug dose and complete information and questions, call [phone] or In Basket pool, [facility]v home infusion ([id number])  CSN Number:  [id number]

## 2020-08-21 NOTE — PROGRESS NOTES
This is a recent snapshot of the patient's [facility] medical record.  For current drug dose and complete information and questions, call [phone] or In Basket pool, [facility]v home infusion ([id number])  CSN Number:  [id number]

## 2020-08-24 ENCOUNTER — TELEPHONE (OUTPATIENT)
Dept: NUTRITION | Facility: CLINIC | Age: 75
End: 2020-08-24

## 2020-08-24 NOTE — PROGRESS NOTES
Nutrition Services phone call:    Called Yossi Rodriguez's wife today to follow-up on EN intake and tolerance since he switched over to Shoka.me Peptide 1.5.    Jennifer tells me that his diarrhea has slowed down but has not stopped. He is not at goal volume of 3-4 cartons/day as he continues to have diarrhea and fullness.     She tells me that he recently started having the hiccups and thinks they started at or around the time he started KF.   However, today he woke up with the hiccups and has also had them after eating a popsicle so she no longer thinks it's from the formula, rather his condition and esophagus.    She tells me that she is also inquiring about his Prilosec mediation.  RD sent message to [employer]oka[employer] rep who indicated that there have been no reports of hiccups with KF products.     Current EN regimen:  Formula: Shoka.me Peptide 1.5   Volume per Jennifer: 4 feedings (3 feedings of 3/4 of a cup and 1 feeding at night at 1/2 cup for a total of 2 & 1/4 cups. (Estimate 10 cups of water a day. It takes at least 1 cup to syringe all his meds.) = 2 cartons/day  Provisions: 1000kcal (17cal/kg), 48 grams protein (0.8g/kg).    She tells me she has been giving Yossi 1 full cup of water both before and after each feeding.     Weight trends: no recent weight per EMR since he switched formula and has been transitioning to goal.  Suspect wt loss as he has not been at goal volume x pat 5-7 days.   Wt Readings from Last 6 Encounters:   08/05/20 58.2 kg (128 lb 3.2 oz)   08/05/20 58.2 kg (128 lb 3.2 oz)   07/22/20 58.8 kg (129 lb 9.6 oz)   07/08/20 59.2 kg (130 lb 9.6 oz)   06/24/20 58 kg (127 lb 14.4 oz)   06/17/20 54.4 kg (120 lb)       Recommendations:  1. Take 3 cartons of formula/day  ---increase feedings QID to 1 full cup (250mL)  ---decrease water volume by 120mL before and after each fdg (120mL water before/after each fdg)    2. Reduce water volume during feedings as suggested above  ---measure out 4 cups water to  give before/after feedings  ---all additional water (~3-4 cups) for med flushes to meet 100% of fluid needs during chemo    Example:   8am: 120mL water -- 250mL formula -- 120mL water   12pm:  120mL water -- 250mL formula -- 120mL water  4pm: 120mL water -- 250mL formula -- 120mL water  8pm: 120mL water -- 120-250mL formula -- 120mL water    Follow-up/monitoring:   - EN intake/tolerance  - BM   - Weight trends    Magaly Breaux, CEDRICK, LD

## 2020-08-25 ENCOUNTER — MYC REFILL (OUTPATIENT)
Dept: ONCOLOGY | Facility: CLINIC | Age: 75
End: 2020-08-25

## 2020-08-25 DIAGNOSIS — K21.9 GASTROESOPHAGEAL REFLUX DISEASE, ESOPHAGITIS PRESENCE NOT SPECIFIED: ICD-10-CM

## 2020-08-25 DIAGNOSIS — C45.0 MESOTHELIOMA (PLEURAL) (H): ICD-10-CM

## 2020-08-31 ENCOUNTER — TELEPHONE (OUTPATIENT)
Dept: NUTRITION | Facility: CLINIC | Age: 75
End: 2020-08-31

## 2020-08-31 NOTE — TELEPHONE ENCOUNTER
Nutrition Services:    Called Nataliia today to discuss and clarify hydration needs for Yossi.    He is at goal feeds of 3 cartons (4 cups) of formula /day.    She tells me that he continues to have 1-2 loose stools per day which has improved from the past.      She tells me that she continues to give him ~9 1/2 cups water/day, 4-5 cups coming from medication flushes and 4 cups from feeding flushes.  She is not able to reduce his water with mediations as it takes that much water to get them down.    Discussed with Nataliia that Yossi only needs ~6 cups water/day as his formula contains almost 3 cups of free water which is = to 9 cups/day which meets his hydration needs.      Recommed to continue using 4-5 cups water with medications.   Recommend Nataliia reduce water before/after feedings to 30-60 mL.   This would give Yossi an additional 1-2 cups water/day.     Discussed signs of inadequate fluids: thirst, dry mouth, concentrated urine.  Yossi will have his labs checked on Tuesday.  Labs will also identify dehydration.     Advised Nataliia to call with further questions.     Magaly Breaux RD.

## 2020-09-04 ENCOUNTER — HOME INFUSION (PRE-WILLOW HOME INFUSION) (OUTPATIENT)
Dept: PHARMACY | Facility: CLINIC | Age: 75
End: 2020-09-04

## 2020-09-04 DIAGNOSIS — C45.0 MESOTHELIOMA (PLEURAL) (H): ICD-10-CM

## 2020-09-04 DIAGNOSIS — K21.9 GASTROESOPHAGEAL REFLUX DISEASE, ESOPHAGITIS PRESENCE NOT SPECIFIED: Primary | ICD-10-CM

## 2020-09-04 DIAGNOSIS — R11.0 NAUSEA: ICD-10-CM

## 2020-09-04 RX ORDER — ONDANSETRON 8 MG/1
8 TABLET, ORALLY DISINTEGRATING ORAL EVERY 8 HOURS PRN
Qty: 60 TABLET | Refills: 1 | Status: SHIPPED | OUTPATIENT
Start: 2020-09-04

## 2020-09-08 ENCOUNTER — PATIENT OUTREACH (OUTPATIENT)
Dept: ONCOLOGY | Facility: CLINIC | Age: 75
End: 2020-09-08

## 2020-09-08 ENCOUNTER — ONCOLOGY VISIT (OUTPATIENT)
Dept: ONCOLOGY | Facility: CLINIC | Age: 75
End: 2020-09-08
Payer: MEDICARE

## 2020-09-08 DIAGNOSIS — C45.0 MESOTHELIOMA (PLEURAL) (H): ICD-10-CM

## 2020-09-08 LAB
ALBUMIN SERPL-MCNC: 2.9 G/DL (ref 3.4–5)
ALP SERPL-CCNC: 57 U/L (ref 40–150)
ALT SERPL W P-5'-P-CCNC: 39 U/L (ref 0–70)
ANION GAP SERPL CALCULATED.3IONS-SCNC: 6 MMOL/L (ref 3–14)
ANISOCYTOSIS BLD QL SMEAR: SLIGHT
AST SERPL W P-5'-P-CCNC: 25 U/L (ref 0–45)
BILIRUB SERPL-MCNC: 0.2 MG/DL (ref 0.2–1.3)
BUN SERPL-MCNC: 14 MG/DL (ref 7–30)
CALCIUM SERPL-MCNC: 9.3 MG/DL (ref 8.5–10.1)
CHLORIDE SERPL-SCNC: 99 MMOL/L (ref 94–109)
CO2 SERPL-SCNC: 29 MMOL/L (ref 20–32)
CREAT SERPL-MCNC: 0.71 MG/DL (ref 0.66–1.25)
DIFFERENTIAL METHOD BLD: ABNORMAL
ERYTHROCYTE [DISTWIDTH] IN BLOOD BY AUTOMATED COUNT: 15.8 % (ref 10–15)
GFR SERPL CREATININE-BSD FRML MDRD: >90 ML/MIN/{1.73_M2}
GLUCOSE SERPL-MCNC: 116 MG/DL (ref 70–99)
HCT VFR BLD AUTO: 42.6 % (ref 40–53)
HGB BLD-MCNC: 13.9 G/DL (ref 13.3–17.7)
LYMPHOCYTES # BLD AUTO: 1.5 10E9/L (ref 0.8–5.3)
LYMPHOCYTES NFR BLD AUTO: 26 %
MCH RBC QN AUTO: 28.7 PG (ref 26.5–33)
MCHC RBC AUTO-ENTMCNC: 32.6 G/DL (ref 31.5–36.5)
MCV RBC AUTO: 88 FL (ref 78–100)
MONOCYTES # BLD AUTO: 1.2 10E9/L (ref 0–1.3)
MONOCYTES NFR BLD AUTO: 20 %
NEUTROPHILS # BLD AUTO: 3.2 10E9/L (ref 1.6–8.3)
NEUTROPHILS NFR BLD AUTO: 54 %
PLATELET # BLD AUTO: 214 10E9/L (ref 150–450)
PLATELET # BLD EST: ABNORMAL 10*3/UL
POTASSIUM SERPL-SCNC: 4.1 MMOL/L (ref 3.4–5.3)
PROT SERPL-MCNC: 6.6 G/DL (ref 6.8–8.8)
RBC # BLD AUTO: 4.85 10E12/L (ref 4.4–5.9)
SODIUM SERPL-SCNC: 134 MMOL/L (ref 133–144)
WBC # BLD AUTO: 5.9 10E9/L (ref 4–11)

## 2020-09-08 PROCEDURE — 80053 COMPREHEN METABOLIC PANEL: CPT | Performed by: INTERNAL MEDICINE

## 2020-09-08 PROCEDURE — 36591 DRAW BLOOD OFF VENOUS DEVICE: CPT

## 2020-09-08 PROCEDURE — 85025 COMPLETE CBC W/AUTO DIFF WBC: CPT | Performed by: INTERNAL MEDICINE

## 2020-09-08 RX ORDER — HEPARIN SODIUM (PORCINE) LOCK FLUSH IV SOLN 100 UNIT/ML 100 UNIT/ML
5 SOLUTION INTRAVENOUS
Status: DISCONTINUED | OUTPATIENT
Start: 2020-09-08 | End: 2020-09-08 | Stop reason: HOSPADM

## 2020-09-08 RX ADMIN — HEPARIN SODIUM (PORCINE) LOCK FLUSH IV SOLN 100 UNIT/ML 5 ML: 100 SOLUTION at 08:24

## 2020-09-08 NOTE — PROGRESS NOTES
Tolerated port access and draw without complaint. Port site scrubbed with Chloraprep for 30 seconds and allowed to dry completely prior to dressing application. Accessed using sterile technique. [place]son tubes drawn-Red gel/Green/Purple tubes. Double signed by patient and RN. See documentation flowsheet. Ericka Hewitt, RN, BSN, OCN

## 2020-09-08 NOTE — PROGRESS NOTES
Received a call from Nataliia that hes had a very rough time these past couple of weeks.  Loose stools have gotten out of control.  Can't seem to tolerate the latest nutritional supplement.  Breathing is very bad.  Walked outside and couldn't catch breath.  Did vomit today even though taking antiemetics.   Might be helpful if a home health RN comes out.

## 2020-09-08 NOTE — PROGRESS NOTES
This is a recent snapshot of the patient's [facility] medical record.  For current drug dose and complete information and questions, call [phone] or In Basket pool, fv home infusion ([id number])  CSN Number:  [id number]

## 2020-09-09 ENCOUNTER — TELEPHONE (OUTPATIENT)
Dept: ONCOLOGY | Facility: CLINIC | Age: 75
End: 2020-09-09

## 2020-09-09 ENCOUNTER — HOME INFUSION (PRE-WILLOW HOME INFUSION) (OUTPATIENT)
Dept: PHARMACY | Facility: CLINIC | Age: 75
End: 2020-09-09

## 2020-09-09 ENCOUNTER — TELEPHONE (OUTPATIENT)
Dept: NUTRITION | Facility: CLINIC | Age: 75
End: 2020-09-09

## 2020-09-09 ENCOUNTER — MYC MEDICAL ADVICE (OUTPATIENT)
Dept: ONCOLOGY | Facility: CLINIC | Age: 75
End: 2020-09-09

## 2020-09-09 ENCOUNTER — VIRTUAL VISIT (OUTPATIENT)
Dept: ONCOLOGY | Facility: CLINIC | Age: 75
End: 2020-09-09
Attending: INTERNAL MEDICINE
Payer: MEDICARE

## 2020-09-09 DIAGNOSIS — C45.0 MESOTHELIOMA (PLEURAL) (H): Primary | ICD-10-CM

## 2020-09-09 DIAGNOSIS — R11.0 NAUSEA: ICD-10-CM

## 2020-09-09 DIAGNOSIS — R19.7 DIARRHEA DUE TO MALABSORPTION: ICD-10-CM

## 2020-09-09 DIAGNOSIS — K90.9 DIARRHEA DUE TO MALABSORPTION: ICD-10-CM

## 2020-09-09 PROCEDURE — 40001009 ZZH VIDEO/TELEPHONE VISIT; NO CHARGE

## 2020-09-09 PROCEDURE — 99214 OFFICE O/P EST MOD 30 MIN: CPT | Mod: 95 | Performed by: INTERNAL MEDICINE

## 2020-09-09 RX ORDER — EPINEPHRINE 1 MG/ML
0.3 INJECTION, SOLUTION INTRAMUSCULAR; SUBCUTANEOUS EVERY 5 MIN PRN
Status: CANCELLED | OUTPATIENT
Start: 2020-09-09

## 2020-09-09 RX ORDER — ALBUTEROL SULFATE 0.83 MG/ML
2.5 SOLUTION RESPIRATORY (INHALATION)
Status: CANCELLED | OUTPATIENT
Start: 2020-09-09

## 2020-09-09 RX ORDER — MORPHINE 10 MG/ML
1 TINCTURE ORAL EVERY 4 HOURS PRN
Qty: 473 ML | Refills: 0 | Status: SHIPPED | OUTPATIENT
Start: 2020-09-09

## 2020-09-09 RX ORDER — METHYLPREDNISOLONE SODIUM SUCCINATE 125 MG/2ML
125 INJECTION, POWDER, LYOPHILIZED, FOR SOLUTION INTRAMUSCULAR; INTRAVENOUS
Status: CANCELLED
Start: 2020-09-09

## 2020-09-09 RX ORDER — DIPHENHYDRAMINE HYDROCHLORIDE 50 MG/ML
50 INJECTION INTRAMUSCULAR; INTRAVENOUS
Status: CANCELLED
Start: 2020-09-09

## 2020-09-09 RX ORDER — HEPARIN SODIUM (PORCINE) LOCK FLUSH IV SOLN 100 UNIT/ML 100 UNIT/ML
5 SOLUTION INTRAVENOUS
Status: CANCELLED | OUTPATIENT
Start: 2020-09-09

## 2020-09-09 RX ORDER — ALBUTEROL SULFATE 90 UG/1
1-2 AEROSOL, METERED RESPIRATORY (INHALATION)
Status: CANCELLED
Start: 2020-09-09

## 2020-09-09 RX ORDER — MEPERIDINE HYDROCHLORIDE 25 MG/ML
25 INJECTION INTRAMUSCULAR; INTRAVENOUS; SUBCUTANEOUS EVERY 30 MIN PRN
Status: CANCELLED | OUTPATIENT
Start: 2020-09-09

## 2020-09-09 RX ORDER — NALOXONE HYDROCHLORIDE 0.4 MG/ML
.1-.4 INJECTION, SOLUTION INTRAMUSCULAR; INTRAVENOUS; SUBCUTANEOUS
Status: CANCELLED | OUTPATIENT
Start: 2020-09-09

## 2020-09-09 RX ORDER — MORPHINE 10 MG/ML
1 TINCTURE ORAL EVERY 4 HOURS PRN
Qty: 473 ML | Refills: 0 | Status: SHIPPED | OUTPATIENT
Start: 2020-09-09 | End: 2020-09-09

## 2020-09-09 RX ORDER — LORAZEPAM 2 MG/ML
0.5 INJECTION INTRAMUSCULAR EVERY 4 HOURS PRN
Status: CANCELLED
Start: 2020-09-09

## 2020-09-09 RX ORDER — HEPARIN SODIUM,PORCINE 10 UNIT/ML
5 VIAL (ML) INTRAVENOUS
Status: CANCELLED | OUTPATIENT
Start: 2020-09-09

## 2020-09-09 RX ORDER — SODIUM CHLORIDE 9 MG/ML
1000 INJECTION, SOLUTION INTRAVENOUS CONTINUOUS PRN
Status: CANCELLED
Start: 2020-09-09

## 2020-09-09 NOTE — TELEPHONE ENCOUNTER
Central Prior Authorization Team   Phone: [phone]      PA Initiation    Medication: Opium 10 MG/ML 1%-PA initiated  Insurance Company: [employer] ([facility]) - Phone [phone] Fax [phone]  Pharmacy Filling the Rx: [id number] [place], [place] - [address]  Filling Pharmacy Phone: [phone]  Filling Pharmacy Fax:    Start Date: 9/9/2020

## 2020-09-09 NOTE — PROGRESS NOTES
"Pradeep Figueroa Jr. is a 75 year old male who is being evaluated via a billable video visit.      The patient has been notified of following:     \"This video visit will be conducted via a call between you and your physician/provider. We have found that certain health care needs can be provided without the need for an in-person physical exam.  This service lets us provide the care you need with a video conversation.  If a prescription is necessary we can send it directly to your pharmacy.  If lab work is needed we can place an order for that and you can then stop by our lab to have the test done at a later time.    Video visits are billed at different rates depending on your insurance coverage.  Please reach out to your insurance provider with any questions.    If during the course of the call the physician/provider feels a video visit is not appropriate, you will not be charged for this service.\"    Patient has given verbal consent for Video visit? Yes  How would you like to obtain your AVS? MyChart  If you are dropped from the video visit, the video invite should be resent to: Text to cell phone: [phone]  Will anyone else be joining your video visit? No      Fabiola MILTON    Video-Visit Details    Type of service:  Video Visit    Video Start Time: 7:15  Video End Time: 8:07 AM    Originating Location (pt. Location): Home    Distant Location (provider location):  [facility]     Platform used for Video Visit: Archie Breen MD      CHIEF COMPLAINT:  Mesothelioma.      HISTORY OF PRESENT ILLNESS:  The patient returns to clinic today to discuss further therapy.  This was a video visit.  The patient is a 75-year-old gentleman with a history of pleural mesothelioma.  He had presented in 07/2019 with progressive dysphagia.  Evaluation, including a CT of the chest, abdomen and pelvis in 02/2020 showed a circumferential distal esophageal thickening with upstream dilation.  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        The patient started treatment with carboplatin, pemetrexed and bevacizumab in 03/2020.  In 06/2020, we switched to ipilimumab and nivolumab.  The patient has been on this treatment now for approximately 6 weeks.  CT scan showed stable disease with decrease in his left pleural effusion.  There was no right-sided pleural effusion.       Patient took two weeks of prednisone and we held immunotherapy.  Diarrhea was apparently better but now has returned in the last day.  It comes on after tube feeding.  He also seems more dyspneic and is havnig problems with his secretions.    I discussed that I would like to try XRt to open up esophagus at this time.  I will refer to [facility] Rad Onc.  I will obtain a new CT scan of chest prior to Rad Onc evaluation.  Dyspnea could be related to recurrent effusion.  CBC is normal from yesterday.    Diarrhea is likely not immune related although it maybe.  We discussed trying Remicade at this point but decided to go ahead and resume immunotherapy off steroids and see how that goes.  I told him I feel there is direct correlation to tube feeds and perhaps opening his esophagus with XRT may benefit this.    ROS:   10 point review of systems shows diarrhea, dyspnea, and retching of secretions.  Otherwise non=-contributory.    LABS:  CBC RESULTS:   Recent Labs   Lab Test 09/08/20  0830   WBC 5.9   RBC 4.85   HGB 13.9   HCT 42.6   MCV 88   MCH 28.7   MCHC 32.6   RDW 15.8*        Last Comprehensive Metabolic Panel:  Sodium   Date Value Ref Range Status   09/08/2020 134 133 - 144 mmol/L Final     Potassium   Date Value Ref Range Status   09/08/2020 4.1 3.4 - 5.3 mmol/L Final     Chloride   Date Value Ref Range Status   09/08/2020 99 94 - 109 mmol/L Final     Carbon Dioxide   Date Value Ref Range Status   09/08/2020 29 20 - 32 mmol/L Final     Anion Gap   Date Value Ref Range Status   09/08/2020 6 3 - 14 mmol/L Final      Glucose   Date Value Ref Range Status   09/08/2020 116 (H) 70 - 99 mg/dL Final     Urea Nitrogen   Date Value Ref Range Status   09/08/2020 14 7 - 30 mg/dL Final     Creatinine   Date Value Ref Range Status   09/08/2020 0.71 0.66 - 1.25 mg/dL Final     GFR Estimate   Date Value Ref Range Status   09/08/2020 >90 >60 mL/min/[1.73_m2] Final     Comment:     Non  GFR Calc  Starting 12/18/2018, serum creatinine based estimated GFR (eGFR) will be   calculated using the Chronic Kidney Disease Epidemiology Collaboration   (CKD-EPI) equation.       Calcium   Date Value Ref Range Status   09/08/2020 9.3 8.5 - 10.1 mg/dL Final     Bilirubin Total   Date Value Ref Range Status   09/08/2020 0.2 0.2 - 1.3 mg/dL Final     Alkaline Phosphatase   Date Value Ref Range Status   09/08/2020 57 40 - 150 U/L Final     ALT   Date Value Ref Range Status   09/08/2020 39 0 - 70 U/L Final     AST   Date Value Ref Range Status   09/08/2020 25 0 - 45 U/L Final     Current Outpatient Medications   Medication     acetaminophen (TYLENOL) 325 MG tablet     albuterol (PROAIR HFA/PROVENTIL HFA/VENTOLIN HFA) 108 (90 Base) MCG/ACT inhaler     cholecalciferol 25 MCG (1000 UT) TABS     diphenoxylate-atropine (LOMOTIL) 2.5-0.025 MG tablet     folic acid (FOLVITE) 1 MG tablet     LORazepam (ATIVAN) 0.5 MG tablet     Nutritional Supplements (ISOSOURCE 1.5 DIMA) LIQD     omeprazole (PRILOSEC) 2 mg/mL suspension     ondansetron (ZOFRAN-ODT) 8 MG ODT tab     opium tincture 10 MG/ML (1%) liquid     predniSONE (DELTASONE) 10 MG tablet     lamoTRIgine (LAMICTAL) 200 MG tablet     oxyCODONE (ROXICODONE) 5 MG tablet     prochlorperazine (COMPAZINE) 10 MG tablet     No current facility-administered medications for this visit.      Facility-Administered Medications Ordered in Other Visits   Medication     heparin 100 UNIT/ML injection 5 mL     PE:  The patient is frail appearing but stable.  Voice is strong.      A/P:    Mesothelioma:  We will  resume ipi nivo with cycle 5.  This was deferred previously    Diarrhea:  I believe this is from tube feedings.  He is working with dietary to obtain a better high protein mix.  Albumin and TP are down.  I will prescribe tincture of opium.    Dysphagia:  I will ask XRT to see patient in [facility] for palliation of stricture.    I will see the patient back next week after CT scan.      I spent over 35 minutes on tis video visit.

## 2020-09-09 NOTE — LETTER
"9/9/2020     RE: Pradeep Figueroa Jr.  [address] [place] [address]    Dear Colleague,    Thank you for referring your patient, Pradeep Figueroa Jr., to the [facility]. Please see a copy of my visit note below.    Pradeep Figueroa Jr. is a 75 year old male who is being evaluated via a billable video visit.      The patient has been notified of following:     \"This video visit will be conducted via a call between you and your physician/provider. We have found that certain health care needs can be provided without the need for an in-person physical exam.  This service lets us provide the care you need with a video conversation.  If a prescription is necessary we can send it directly to your pharmacy.  If lab work is needed we can place an order for that and you can then stop by our lab to have the test done at a later time.    Video visits are billed at different rates depending on your insurance coverage.  Please reach out to your insurance provider with any questions.    If during the course of the call the physician/provider feels a video visit is not appropriate, you will not be charged for this service.\"    Patient has given verbal consent for Video visit? Yes  How would you like to obtain your AVS? MyChart  If you are dropped from the video visit, the video invite should be resent to: Text to cell phone: [phone]  Will anyone else be joining your video visit? No      Fabiola MILTON    Video-Visit Details    Type of service:  Video Visit    Video Start Time: 7:15  Video End Time: 8:07 AM    Originating Location (pt. Location): Home    Distant Location (provider location):  [facility]     Platform used for Video Visit: Archie Breen MD      CHIEF COMPLAINT:  Mesothelioma.      HISTORY OF PRESENT ILLNESS:  The patient returns to clinic today to discuss further therapy.  This was a video visit.  The patient is a 75-year-old gentleman with " a history of pleural mesothelioma.  He had presented in 07/2019 with progressive dysphagia.  Evaluation, including a CT of the chest, abdomen and pelvis in 02/2020 showed a circumferential distal esophageal thickening with upstream dilation.  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        The patient started treatment with carboplatin, pemetrexed and bevacizumab in 03/2020.  In 06/2020, we switched to ipilimumab and nivolumab.  The patient has been on this treatment now for approximately 6 weeks.  CT scan showed stable disease with decrease in his left pleural effusion.  There was no right-sided pleural effusion.       Patient took two weeks of prednisone and we held immunotherapy.  Diarrhea was apparently better but now has returned in the last day.  It comes on after tube feeding.  He also seems more dyspneic and is havnig problems with his secretions.    I discussed that I would like to try XRt to open up esophagus at this time.  I will refer to  Rad Onc.  I will obtain a new CT scan of chest prior to Rad Onc evaluation.  Dyspnea could be related to recurrent effusion.  CBC is normal from yesterday.    Diarrhea is likely not immune related although it maybe.  We discussed trying Remicade at this point but decided to go ahead and resume immunotherapy off steroids and see how that goes.  I told him I feel there is direct correlation to tube feeds and perhaps opening his esophagus with XRT may benefit this.    ROS:   10 point review of systems shows diarrhea, dyspnea, and retching of secretions.  Otherwise non=-contributory.    LABS:  CBC RESULTS:   Recent Labs   Lab Test 09/08/20  0830   WBC 5.9   RBC 4.85   HGB 13.9   HCT 42.6   MCV 88   MCH 28.7   MCHC 32.6   RDW 15.8*        Last Comprehensive Metabolic Panel:  Sodium   Date Value Ref Range Status   09/08/2020 134 133 - 144 mmol/L Final     Potassium   Date Value Ref Range Status   09/08/2020 4.1 3.4 - 5.3 mmol/L Final      Chloride   Date Value Ref Range Status   09/08/2020 99 94 - 109 mmol/L Final     Carbon Dioxide   Date Value Ref Range Status   09/08/2020 29 20 - 32 mmol/L Final     Anion Gap   Date Value Ref Range Status   09/08/2020 6 3 - 14 mmol/L Final     Glucose   Date Value Ref Range Status   09/08/2020 116 (H) 70 - 99 mg/dL Final     Urea Nitrogen   Date Value Ref Range Status   09/08/2020 14 7 - 30 mg/dL Final     Creatinine   Date Value Ref Range Status   09/08/2020 0.71 0.66 - 1.25 mg/dL Final     GFR Estimate   Date Value Ref Range Status   09/08/2020 >90 >60 mL/min/[1.73_m2] Final     Comment:     Non  GFR Calc  Starting 12/18/2018, serum creatinine based estimated GFR (eGFR) will be   calculated using the Chronic Kidney Disease Epidemiology Collaboration   (CKD-EPI) equation.       Calcium   Date Value Ref Range Status   09/08/2020 9.3 8.5 - 10.1 mg/dL Final     Bilirubin Total   Date Value Ref Range Status   09/08/2020 0.2 0.2 - 1.3 mg/dL Final     Alkaline Phosphatase   Date Value Ref Range Status   09/08/2020 57 40 - 150 U/L Final     ALT   Date Value Ref Range Status   09/08/2020 39 0 - 70 U/L Final     AST   Date Value Ref Range Status   09/08/2020 25 0 - 45 U/L Final     Current Outpatient Medications   Medication     acetaminophen (TYLENOL) 325 MG tablet     albuterol (PROAIR HFA/PROVENTIL HFA/VENTOLIN HFA) 108 (90 Base) MCG/ACT inhaler     cholecalciferol 25 MCG (1000 UT) TABS     diphenoxylate-atropine (LOMOTIL) 2.5-0.025 MG tablet     folic acid (FOLVITE) 1 MG tablet     LORazepam (ATIVAN) 0.5 MG tablet     Nutritional Supplements (ISOSOURCE 1.5 DIMA) LIQD     omeprazole (PRILOSEC) 2 mg/mL suspension     ondansetron (ZOFRAN-ODT) 8 MG ODT tab     opium tincture 10 MG/ML (1%) liquid     predniSONE (DELTASONE) 10 MG tablet     lamoTRIgine (LAMICTAL) 200 MG tablet     oxyCODONE (ROXICODONE) 5 MG tablet     prochlorperazine (COMPAZINE) 10 MG tablet     No current facility-administered  medications for this visit.      Facility-Administered Medications Ordered in Other Visits   Medication     heparin 100 UNIT/ML injection 5 mL     PE:  The patient is frail appearing but stable.  Voice is strong.      A/P:    Mesothelioma:  We will resume ipi nivo with cycle 5.  This was deferred previously    Diarrhea:  I believe this is from tube feedings.  He is working with dietary to obtain a better high protein mix.  Albumin and TP are down.  I will prescribe tincture of opium.    Dysphagia:  I will ask XRT to see patient in MG for palliation of stricture.    I will see the patient back next week after CT scan.    I spent over 35 minutes on tis video visit.    Again, thank you for allowing me to participate in the care of your patient.      Sincerely,      Myles Breen MD

## 2020-09-09 NOTE — TELEPHONE ENCOUNTER
Prior Authorization Retail Medication Request    Medication/Dose:   ICD code (if different than what is on RX):    Previously Tried and Failed:    Rationale:      Insurance Name:  Medicare  Insurance ID:  [id number]       Pharmacy Information (if different than what is on RX)  Name:    Phone:

## 2020-09-10 DIAGNOSIS — R11.0 NAUSEA: ICD-10-CM

## 2020-09-10 DIAGNOSIS — C45.0 MESOTHELIOMA (PLEURAL) (H): Primary | ICD-10-CM

## 2020-09-10 RX ORDER — OLANZAPINE 2.5 MG/1
2.5 TABLET, FILM COATED ORAL AT BEDTIME
Qty: 30 TABLET | Refills: 1 | Status: SHIPPED | OUTPATIENT
Start: 2020-09-10 | End: 2020-10-26

## 2020-09-10 RX ORDER — OLANZAPINE 2.5 MG/1
2.5 TABLET, FILM COATED ORAL AT BEDTIME
Qty: 30 TABLET | Refills: 4 | Status: SHIPPED | OUTPATIENT
Start: 2020-09-10

## 2020-09-10 NOTE — TELEPHONE ENCOUNTER
Nutrition Services:     Received phone call from Nataliia Figueroa, Yossi's wife.    Due to ongoing intolerance (diarrhea and vomiting) to [employer] formula in addition to previously tried formulas (Isosource 1.5 and TwoCal), she inquires about alternative options.     RD spoke with [facility] RDElena to review formula options and insurance coverage.      Recommendations:   Formula: Peptamen 1.5 shadi (semi-elemental)  Volume: 6 cartons/day  MOA: gravity bag bolus  Provisions: 1500mL (1155 ml free water) 2250kcal (40cal/kg), 102g pro (1.8g/kg), no fiber    Suggest taking 1/2 carton per feeding, increasing by 1/2 carton at each feeding until goal of 1 1/2 cartons QID OR 2 cartons TID is reached.   --Measure out 5-6 cups water am.  --Flush with 60mL water before and after each feeding.   --Use remainder of measured water for med flushes.      Total fluids with free water in formula = 9-10 cups/day    --Encouraged to infuse each feeding >60 minutes, sitting upright at >45 degrees  --Spread feedings out by 4 hours    If Yossi fails to tolerate Peptamen formula, will consider change MOA to pump feedings.  If diarrhea and vomiting persist with pump feeds, recommend J tube extension.  This was discussed with Nataliia who verbalized understanding.     CEDRICK will follow-up with Nataliia and Yossi in 3-4 days.     Magaly Breaux RD, [facility] [place]  [phone]

## 2020-09-10 NOTE — TELEPHONE ENCOUNTER
PRIOR AUTHORIZATION DENIED    Medication: Opium 10 MG/ML 1%-PA excluded    Denial Date: 9/9/2020    Denial Rational: Exclusion            Appeal Information:

## 2020-09-11 ENCOUNTER — TELEPHONE (OUTPATIENT)
Dept: NUTRITION | Facility: CLINIC | Age: 75
End: 2020-09-11

## 2020-09-11 NOTE — PROGRESS NOTES
This is a recent snapshot of the patient's [facility] medical record.  For current drug dose and complete information and questions, call [phone] or In Basket pool, fv home infusion ([id number])  CSN Number:  [id number]

## 2020-09-11 NOTE — TELEPHONE ENCOUNTER
RECORDS STATUS - ALL OTHER DIAGNOSIS      RECORDS RECEIVED FROM: [facility]   DATE RECEIVED: 9/17/2020    NOTES STATUS DETAILS   OFFICE NOTE from referring provider     OFFICE NOTE from medical oncologist Complete EPIC   DISCHARGE SUMMARY from hospital Complete Epic Malnutrition/ Mesothelioma    DISCHARGE REPORT from the ER     OPERATIVE REPORT Complete Epic - [place] [facility] 2/20/2020    MEDICATION LIST Complete EP   CLINICAL TRIAL TREATMENTS TO DATE     LABS     PATHOLOGY REPORTS Complete Internal Surgical Biopsy 2/20/2020   ESOPHAGUS, DISTAL STRICTURE, BIOPSY,:   - Squamous epithelium with mild chronic inflammation and reactive changes   - No evidence of dysplasia or malignancy     Fine Needle Aspiration    ANYTHING RELATED TO DIAGNOSIS Complete Labs last updated on 9/10/2020    GENONOMIC TESTING     TYPE:     IMAGING (NEED IMAGES & REPORT)     CT SCANS Complete    Complete- [facility]  CT Soft Tissue 9/14/2020     CT Chest 9/14/2020     CT Chest Abdomen Pelvis 8/17/2020    Requested- [facility]   CT Abdomen Pelvis 10/13/2008     Xray Abdomen  Complete- [facility] 10/16/2008 and 10/14/2008    MRI     Xray Chest Complete 3/17/2020    MAMMO     ULTRASOUND     PET       Action    Action Taken 9/14/2020 8:34am     I called [facility] to request [facility] ph: : [phone] option 4 - they need to fax over the image request.     I called [facility] [phone]     I faxed over a request for imaging to [facility]. Requesting all imaging from 2008 to the present.

## 2020-09-11 NOTE — TELEPHONE ENCOUNTER
Nutrition Services:     Called Nataliia and Yossi today to follow-up on formula tolerance.    Yossi switched to Peptamen 1.5 ~2 days ago.  He tells me that he has been feeling better with much less diarrhea.    He has been able to get 3 cartons /day and now working towards 4 and 5.  He continues to have some fullness and inquires about the next steps with feedings if he cannot tolerate goal feedings on Peptamen.     RD reviewed pump feedings cycled for 24, 18 or 12 hours/day.  Explained and described a feeding pump and Kunal pack.   He is interested in this route of feedings if he is not able to reach his goal of 5-6 cartons/day.   RD encouraged Yossi to continue to take feedings slowly, transitioning up by 1/2 carton each day.    Yossi and Nataliia expressed their understanding and appreciation for the phone call.     Nataliia will update RD in 5-6 days on Yossi's feeding progress.     Magaly Breaux RDN, LDN  [facility]  [phone]

## 2020-09-14 ENCOUNTER — ANCILLARY PROCEDURE (OUTPATIENT)
Dept: CT IMAGING | Facility: CLINIC | Age: 75
End: 2020-09-14
Attending: INTERNAL MEDICINE
Payer: MEDICARE

## 2020-09-14 ENCOUNTER — ANCILLARY PROCEDURE (OUTPATIENT)
Dept: GENERAL RADIOLOGY | Facility: CLINIC | Age: 75
End: 2020-09-14
Payer: MEDICARE

## 2020-09-14 DIAGNOSIS — C45.0 MESOTHELIOMA (PLEURAL) (H): ICD-10-CM

## 2020-09-14 DIAGNOSIS — G25.0 ESSENTIAL TREMOR: Primary | ICD-10-CM

## 2020-09-14 PROCEDURE — 70491 CT SOFT TISSUE NECK W/DYE: CPT | Performed by: RADIOLOGY

## 2020-09-14 PROCEDURE — 71260 CT THORAX DX C+: CPT | Performed by: RADIOLOGY

## 2020-09-14 RX ORDER — LORAZEPAM 2 MG/ML
0.5 INJECTION INTRAMUSCULAR EVERY 4 HOURS PRN
Status: CANCELLED
Start: 2020-10-14

## 2020-09-14 RX ORDER — MEPERIDINE HYDROCHLORIDE 25 MG/ML
25 INJECTION INTRAMUSCULAR; INTRAVENOUS; SUBCUTANEOUS EVERY 30 MIN PRN
Status: CANCELLED | OUTPATIENT
Start: 2020-10-14

## 2020-09-14 RX ORDER — MEPERIDINE HYDROCHLORIDE 25 MG/ML
25 INJECTION INTRAMUSCULAR; INTRAVENOUS; SUBCUTANEOUS EVERY 30 MIN PRN
Status: CANCELLED | OUTPATIENT
Start: 2020-09-16

## 2020-09-14 RX ORDER — HEPARIN SODIUM (PORCINE) LOCK FLUSH IV SOLN 100 UNIT/ML 100 UNIT/ML
5 SOLUTION INTRAVENOUS
Status: CANCELLED | OUTPATIENT
Start: 2020-10-14

## 2020-09-14 RX ORDER — METHYLPREDNISOLONE SODIUM SUCCINATE 125 MG/2ML
125 INJECTION, POWDER, LYOPHILIZED, FOR SOLUTION INTRAMUSCULAR; INTRAVENOUS
Status: CANCELLED
Start: 2020-10-14

## 2020-09-14 RX ORDER — HEPARIN SODIUM,PORCINE 10 UNIT/ML
5 VIAL (ML) INTRAVENOUS
Status: CANCELLED | OUTPATIENT
Start: 2020-09-16

## 2020-09-14 RX ORDER — ALBUTEROL SULFATE 0.83 MG/ML
2.5 SOLUTION RESPIRATORY (INHALATION)
Status: CANCELLED | OUTPATIENT
Start: 2020-10-14

## 2020-09-14 RX ORDER — DIPHENHYDRAMINE HYDROCHLORIDE 50 MG/ML
50 INJECTION INTRAMUSCULAR; INTRAVENOUS
Status: CANCELLED
Start: 2020-10-14

## 2020-09-14 RX ORDER — METHYLPREDNISOLONE SODIUM SUCCINATE 125 MG/2ML
125 INJECTION, POWDER, LYOPHILIZED, FOR SOLUTION INTRAMUSCULAR; INTRAVENOUS
Status: CANCELLED
Start: 2020-09-16

## 2020-09-14 RX ORDER — ALBUTEROL SULFATE 90 UG/1
1-2 AEROSOL, METERED RESPIRATORY (INHALATION)
Status: CANCELLED
Start: 2020-09-16

## 2020-09-14 RX ORDER — ALBUTEROL SULFATE 90 UG/1
1-2 AEROSOL, METERED RESPIRATORY (INHALATION)
Status: CANCELLED
Start: 2020-10-14

## 2020-09-14 RX ORDER — NALOXONE HYDROCHLORIDE 0.4 MG/ML
.1-.4 INJECTION, SOLUTION INTRAMUSCULAR; INTRAVENOUS; SUBCUTANEOUS
Status: CANCELLED | OUTPATIENT
Start: 2020-10-14

## 2020-09-14 RX ORDER — IOPAMIDOL 755 MG/ML
100 INJECTION, SOLUTION INTRAVASCULAR ONCE
Status: COMPLETED | OUTPATIENT
Start: 2020-09-14 | End: 2020-09-14

## 2020-09-14 RX ORDER — HEPARIN SODIUM (PORCINE) LOCK FLUSH IV SOLN 100 UNIT/ML 100 UNIT/ML
5 SOLUTION INTRAVENOUS
Status: CANCELLED | OUTPATIENT
Start: 2020-09-16

## 2020-09-14 RX ORDER — HEPARIN SODIUM,PORCINE 10 UNIT/ML
5 VIAL (ML) INTRAVENOUS
Status: CANCELLED | OUTPATIENT
Start: 2020-10-14

## 2020-09-14 RX ORDER — LORAZEPAM 2 MG/ML
0.5 INJECTION INTRAMUSCULAR EVERY 4 HOURS PRN
Status: CANCELLED
Start: 2020-09-16

## 2020-09-14 RX ORDER — DIPHENHYDRAMINE HYDROCHLORIDE 50 MG/ML
50 INJECTION INTRAMUSCULAR; INTRAVENOUS
Status: CANCELLED
Start: 2020-09-16

## 2020-09-14 RX ORDER — HEPARIN SODIUM (PORCINE) LOCK FLUSH IV SOLN 100 UNIT/ML 100 UNIT/ML
5 SOLUTION INTRAVENOUS ONCE
Status: DISPENSED | OUTPATIENT
Start: 2020-09-14

## 2020-09-14 RX ORDER — NALOXONE HYDROCHLORIDE 0.4 MG/ML
.1-.4 INJECTION, SOLUTION INTRAMUSCULAR; INTRAVENOUS; SUBCUTANEOUS
Status: CANCELLED | OUTPATIENT
Start: 2020-09-16

## 2020-09-14 RX ORDER — SODIUM CHLORIDE 9 MG/ML
1000 INJECTION, SOLUTION INTRAVENOUS CONTINUOUS PRN
Status: CANCELLED
Start: 2020-09-16

## 2020-09-14 RX ORDER — ALBUTEROL SULFATE 0.83 MG/ML
2.5 SOLUTION RESPIRATORY (INHALATION)
Status: CANCELLED | OUTPATIENT
Start: 2020-09-16

## 2020-09-14 RX ORDER — SODIUM CHLORIDE 9 MG/ML
1000 INJECTION, SOLUTION INTRAVENOUS CONTINUOUS PRN
Status: CANCELLED
Start: 2020-10-14

## 2020-09-14 RX ORDER — EPINEPHRINE 1 MG/ML
0.3 INJECTION, SOLUTION INTRAMUSCULAR; SUBCUTANEOUS EVERY 5 MIN PRN
Status: CANCELLED | OUTPATIENT
Start: 2020-09-16

## 2020-09-14 RX ORDER — EPINEPHRINE 1 MG/ML
0.3 INJECTION, SOLUTION INTRAMUSCULAR; SUBCUTANEOUS EVERY 5 MIN PRN
Status: CANCELLED | OUTPATIENT
Start: 2020-10-14

## 2020-09-14 RX ADMIN — IOPAMIDOL 100 ML: 755 INJECTION, SOLUTION INTRAVASCULAR at 15:14

## 2020-09-15 ENCOUNTER — TELEPHONE (OUTPATIENT)
Dept: NUTRITION | Facility: CLINIC | Age: 75
End: 2020-09-15

## 2020-09-15 NOTE — TELEPHONE ENCOUNTER
Nutrition Services:    Called Nataliia today to check in on Yossi's tolerance to Peptamen formula.   She tells me that Yossi has reached his goal of 5-6 cartons of formula/day.   He is very pleased with his tolerance to this as he has not had any diarrhea or vomiting in the past 4-5 days.   Per Nataliia's report, he has gained one pound.    He is taking 4-5 additional cups of water/day. She inquires about water volume and wants to ensure his hydration needs are also being met.      Recommend measuring out 6 cups of water/day and using this as tube feeding and med flushes.   She has not been following this guideline until recently and likes measuring.  This feel as though this has been easier to track.      Review of previous recommendations:  Formula: Peptamen 1.5 shadi (semi-elemental)  Volume: 6 cartons/day  MOA: gravity bag bolus  Provisions: 1500mL (1155 ml free water) 2250kcal (40cal/kg), 102g pro (1.8g/kg), no fiber     --Take 5-6 cartons/day; 2 cartons TID OR 1 1/2 cartons QID  --Measure out 5-6 cups water am.  --Flush with 60mL water before and after each feeding.   --Use remainder of measured water for med flushes.       Magaly Breaux RDN, [facility] [place]  [phone]

## 2020-09-16 ENCOUNTER — INFUSION THERAPY VISIT (OUTPATIENT)
Dept: ONCOLOGY | Facility: CLINIC | Age: 75
End: 2020-09-16
Attending: INTERNAL MEDICINE
Payer: MEDICARE

## 2020-09-16 ENCOUNTER — APPOINTMENT (OUTPATIENT)
Dept: LAB | Facility: CLINIC | Age: 75
End: 2020-09-16
Attending: INTERNAL MEDICINE
Payer: MEDICARE

## 2020-09-16 VITALS
OXYGEN SATURATION: 95 % | RESPIRATION RATE: 16 BRPM | DIASTOLIC BLOOD PRESSURE: 80 MMHG | BODY MASS INDEX: 20.55 KG/M2 | SYSTOLIC BLOOD PRESSURE: 133 MMHG | TEMPERATURE: 98.6 F | WEIGHT: 123.5 LBS | HEART RATE: 103 BPM

## 2020-09-16 DIAGNOSIS — C45.0 MESOTHELIOMA (PLEURAL) (H): Primary | ICD-10-CM

## 2020-09-16 LAB
ALBUMIN SERPL-MCNC: 2.9 G/DL (ref 3.4–5)
ALP SERPL-CCNC: 45 U/L (ref 40–150)
ALT SERPL W P-5'-P-CCNC: 31 U/L (ref 0–70)
ANION GAP SERPL CALCULATED.3IONS-SCNC: 4 MMOL/L (ref 3–14)
AST SERPL W P-5'-P-CCNC: 14 U/L (ref 0–45)
BASOPHILS # BLD AUTO: 0.1 10E9/L (ref 0–0.2)
BASOPHILS NFR BLD AUTO: 0.5 %
BILIRUB SERPL-MCNC: 0.2 MG/DL (ref 0.2–1.3)
BUN SERPL-MCNC: 17 MG/DL (ref 7–30)
CALCIUM SERPL-MCNC: 8.7 MG/DL (ref 8.5–10.1)
CHLORIDE SERPL-SCNC: 105 MMOL/L (ref 94–109)
CO2 SERPL-SCNC: 27 MMOL/L (ref 20–32)
CREAT SERPL-MCNC: 0.64 MG/DL (ref 0.66–1.25)
DIFFERENTIAL METHOD BLD: ABNORMAL
EOSINOPHIL # BLD AUTO: 0 10E9/L (ref 0–0.7)
EOSINOPHIL NFR BLD AUTO: 0 %
ERYTHROCYTE [DISTWIDTH] IN BLOOD BY AUTOMATED COUNT: 16.1 % (ref 10–15)
GFR SERPL CREATININE-BSD FRML MDRD: >90 ML/MIN/{1.73_M2}
GLUCOSE SERPL-MCNC: 141 MG/DL (ref 70–99)
HCT VFR BLD AUTO: 41 % (ref 40–53)
HGB BLD-MCNC: 13.1 G/DL (ref 13.3–17.7)
IMM GRANULOCYTES # BLD: 0.1 10E9/L (ref 0–0.4)
IMM GRANULOCYTES NFR BLD: 0.5 %
LYMPHOCYTES # BLD AUTO: 1 10E9/L (ref 0.8–5.3)
LYMPHOCYTES NFR BLD AUTO: 9.9 %
MCH RBC QN AUTO: 28.7 PG (ref 26.5–33)
MCHC RBC AUTO-ENTMCNC: 32 G/DL (ref 31.5–36.5)
MCV RBC AUTO: 90 FL (ref 78–100)
MONOCYTES # BLD AUTO: 1 10E9/L (ref 0–1.3)
MONOCYTES NFR BLD AUTO: 10.3 %
NEUTROPHILS # BLD AUTO: 7.9 10E9/L (ref 1.6–8.3)
NEUTROPHILS NFR BLD AUTO: 78.8 %
NRBC # BLD AUTO: 0 10*3/UL
NRBC BLD AUTO-RTO: 0 /100
PLATELET # BLD AUTO: 283 10E9/L (ref 150–450)
POTASSIUM SERPL-SCNC: 4.4 MMOL/L (ref 3.4–5.3)
PROT SERPL-MCNC: 6.7 G/DL (ref 6.8–8.8)
RBC # BLD AUTO: 4.57 10E12/L (ref 4.4–5.9)
SODIUM SERPL-SCNC: 136 MMOL/L (ref 133–144)
WBC # BLD AUTO: 10.1 10E9/L (ref 4–11)

## 2020-09-16 PROCEDURE — 25800030 ZZH RX IP 258 OP 636: Mod: ZF | Performed by: INTERNAL MEDICINE

## 2020-09-16 PROCEDURE — 80053 COMPREHEN METABOLIC PANEL: CPT | Performed by: INTERNAL MEDICINE

## 2020-09-16 PROCEDURE — 85025 COMPLETE CBC W/AUTO DIFF WBC: CPT | Performed by: INTERNAL MEDICINE

## 2020-09-16 PROCEDURE — 25000128 H RX IP 250 OP 636: Mod: ZF | Performed by: INTERNAL MEDICINE

## 2020-09-16 PROCEDURE — G0008 ADMIN INFLUENZA VIRUS VAC: HCPCS

## 2020-09-16 PROCEDURE — 90662 IIV NO PRSV INCREASED AG IM: CPT | Mod: ZF | Performed by: INTERNAL MEDICINE

## 2020-09-16 PROCEDURE — 99443 ZZC PHYSICIAN TELEPHONE EVALUATION 21-30 MIN: CPT | Mod: 95 | Performed by: INTERNAL MEDICINE

## 2020-09-16 PROCEDURE — 96417 CHEMO IV INFUS EACH ADDL SEQ: CPT

## 2020-09-16 PROCEDURE — 96413 CHEMO IV INFUSION 1 HR: CPT

## 2020-09-16 RX ORDER — HEPARIN SODIUM (PORCINE) LOCK FLUSH IV SOLN 100 UNIT/ML 100 UNIT/ML
5 SOLUTION INTRAVENOUS
Status: DISCONTINUED | OUTPATIENT
Start: 2020-09-16 | End: 2020-09-16 | Stop reason: HOSPADM

## 2020-09-16 RX ORDER — HEPARIN SODIUM (PORCINE) LOCK FLUSH IV SOLN 100 UNIT/ML 100 UNIT/ML
5 SOLUTION INTRAVENOUS
Status: COMPLETED | OUTPATIENT
Start: 2020-09-16 | End: 2020-09-16

## 2020-09-16 RX ORDER — HEPARIN SODIUM,PORCINE 10 UNIT/ML
5 VIAL (ML) INTRAVENOUS
Status: DISCONTINUED | OUTPATIENT
Start: 2020-09-16 | End: 2020-09-16 | Stop reason: HOSPADM

## 2020-09-16 RX ADMIN — Medication 5 ML: at 07:22

## 2020-09-16 RX ADMIN — SODIUM CHLORIDE 50 MG: 9 INJECTION, SOLUTION INTRAVENOUS at 09:33

## 2020-09-16 RX ADMIN — INFLUENZA A VIRUS A/MICHIGAN/45/2015 X-275 (H1N1) ANTIGEN (FORMALDEHYDE INACTIVATED), INFLUENZA A VIRUS A/SINGAPORE/INFIMH-16-0019/2016 IVR-186 (H3N2) ANTIGEN (FORMALDEHYDE INACTIVATED), INFLUENZA B VIRUS B/PHUKET/3073/2013 ANTIGEN (FORMALDEHYDE INACTIVATED), AND INFLUENZA B VIRUS B/MARYLAND/15/2016 BX-69A ANTIGEN (FORMALDEHYDE INACTIVATED) 0.7 ML: 60; 60; 60; 60 INJECTION, SUSPENSION INTRAMUSCULAR at 10:03

## 2020-09-16 RX ADMIN — SODIUM CHLORIDE 250 ML: 9 INJECTION, SOLUTION INTRAVENOUS at 08:51

## 2020-09-16 RX ADMIN — Medication 5 ML: at 10:18

## 2020-09-16 RX ADMIN — SODIUM CHLORIDE 160 MG: 9 INJECTION, SOLUTION INTRAVENOUS at 08:51

## 2020-09-16 ASSESSMENT — PAIN SCALES - GENERAL: PAINLEVEL: NO PAIN (0)

## 2020-09-16 NOTE — LETTER
"    9/16/2020         RE: Pradeep Figueroa Jr.  [address] [place] [address]        Dear Colleague,    Thank you for referring your patient, Pradeep Figueroa Jr., to the [facility]. Please see a copy of my visit note below.    Pradeep Figueroa Jr. is a 75 year old male who is being evaluated via a billable telephone visit.      The patient has been notified of following:     \"This telephone visit will be conducted via a call between you and your physician/provider. We have found that certain health care needs can be provided without the need for a physical exam.  This service lets us provide the care you need with a short phone conversation.  If a prescription is necessary we can send it directly to your pharmacy.  If lab work is needed we can place an order for that and you can then stop by our lab to have the test done at a later time.    Telephone visits are billed at different rates depending on your insurance coverage. During this emergency period, for some insurers they may be billed the same as an in-person visit.  Please reach out to your insurance provider with any questions.    If during the course of the call the physician/provider feels a telephone visit is not appropriate, you will not be charged for this service.\"    Patient has given verbal consent for Telephone visit?  Yes    What phone number would you like to be contacted at? [phone]    How would you like to obtain your AVS? Gerhardhardeny     I have reviewed and updated the patient's allergies and medication list. Patient was asked to provide any patient recorded vital signs, height and/or weight.  Please see in \"Patient Reported Vital Signs\" tab information.        Concerns: Patient has no new concerns.        Refills: None           ROSALINE Brian          Phone call duration:  21 minutes    Myles Breen MD      TELEPHONE VISIT       CHIEF COMPLAINT:  Pleural mesothelioma.      HISTORY OF PRESENT " "ILLNESS:  The patient returned for day 1 of cycle 7 of ipilimumab and nivolumab today.  He had his treatment at [place].  This is a telephone visit.      The patient feels \"infinitely better\" than he did last week when I spoke to him.  The diarrhea and loose stools have resolved, and he is having one stool per day.  He attributes this to the new formulation that Nutrition Services has delivered to him.  His gagging and retching seem to be better.  He feels that some of his secretions are getting through his esophageal stricture.  He will see Radiation Oncology tomorrow and discuss with Dr. Martínez at [facility] [place] the possibility of palliative radiation to open up the tight stricture in his esophagus.  The patient's laboratories today are relatively unchanged, although the total protein in his blood has gone up.      I discussed with him that we would continue with his current therapy, and I would see him back with a CT scan in 6 weeks.  The patient will receive his next dose of Opdivo in 2 weeks, and I will have a virtual visit for him with Rajni Drummond at [facility].      REVIEW OF SYSTEMS:  A 10 point review of systems is relatively noncontributory.  He still has some difficulty clearing his secretions, but that has improved.  A 10 point review of systems was carried out.      LABORATORY:  Comprehensive metabolic panel and CBC were essentially normal.  Albumin was slightly low, but stable at 2.9. CBC RESULTS:   Recent Labs   Lab Test 09/16/20  0728   WBC 10.1   RBC 4.57   HGB 13.1*   HCT 41.0   MCV 90   MCH 28.7   MCHC 32.0   RDW 16.1*        Last Comprehensive Metabolic Panel:  Sodium   Date Value Ref Range Status   09/16/2020 136 133 - 144 mmol/L Final     Potassium   Date Value Ref Range Status   09/16/2020 4.4 3.4 - 5.3 mmol/L Final     Chloride   Date Value Ref Range Status   09/16/2020 105 94 - 109 mmol/L Final     Carbon Dioxide   Date Value Ref Range Status   09/16/2020 27 20 - 32 " mmol/L Final     Anion Gap   Date Value Ref Range Status   09/16/2020 4 3 - 14 mmol/L Final     Glucose   Date Value Ref Range Status   09/16/2020 141 (H) 70 - 99 mg/dL Final     Urea Nitrogen   Date Value Ref Range Status   09/16/2020 17 7 - 30 mg/dL Final     Creatinine   Date Value Ref Range Status   09/16/2020 0.64 (L) 0.66 - 1.25 mg/dL Final     GFR Estimate   Date Value Ref Range Status   09/16/2020 >90 >60 mL/min/[1.73_m2] Final     Comment:     Non  GFR Calc  Starting 12/18/2018, serum creatinine based estimated GFR (eGFR) will be   calculated using the Chronic Kidney Disease Epidemiology Collaboration   (CKD-EPI) equation.       Calcium   Date Value Ref Range Status   09/16/2020 8.7 8.5 - 10.1 mg/dL Final     Bilirubin Total   Date Value Ref Range Status   09/16/2020 0.2 0.2 - 1.3 mg/dL Final     Alkaline Phosphatase   Date Value Ref Range Status   09/16/2020 45 40 - 150 U/L Final     ALT   Date Value Ref Range Status   09/16/2020 31 0 - 70 U/L Final     AST   Date Value Ref Range Status   09/16/2020 14 0 - 45 U/L Final        IMAGING:  No imaging was carried out today.   Recent Results (from the past 744 hour(s))   CT Chest/Abdomen/Pelvis w Contrast    Narrative    EXAMINATION: CT CHEST/ABDOMEN/PELVIS W CONTRAST, 8/17/2020 2:13 PM    TECHNIQUE: Helical CT images from the thoracic inlet through the  symphysis pubis were obtained with intravenous contrast. Contrast  dose: 78 ml isovue 370    COMPARISON: CT 6/16/2020 and PET/CT dated 3/10/20,    HISTORY: Mesothelioma (pleural) (H).    FINDINGS:    Lower neck: Left internal jugular lymph node measuring 0.7 cm(3/32).    Chest:   There is a large enhancing soft tissue density mass measuring 1.8 x 3  x 8.1 cm (AP, T, CC) along the distal esophagus located between the  left atrium and descending thoracic aorta causing compression of the  esophagus and proximal dilatation. Inferiorly the mass extends to the  level of the gastroesophageal junction  and extends to bilateral  diaphragmatic crux and left parietal pleura (series 3, image 264).  There is moderate pleural effusion, decreased compared to prior study.    Redemonstrated enhancing pleural-based mass in the posterior left  upper lobe along the left fifth rib with extension to the T4-5 neural  foramen (3/67).  Lungs: No suspicious nodules, focal consolidation or mass. No pleural  effusion or pneumothorax.  Mediastinum: No lymphadenopathy by size criteria.  Heart and great vessels: Heart is normal in size and there is no  pericardial effusion. Aorta and main pulmonary artery are within  normal limits in caliber.    Abdomen and pelvis:  Liver: Stable hepatic cysts. No enhancing masses.  Biliary/Gallbladder: No CT evidence of calculi, wall thickening or  pericholecystic fluid. No intra or extrahepatic biliary dilatation.  Spleen: Normal size. No focal lesions.  Pancreas: No evidence of pancreatic mass or peripancreatic fluid.  Adrenals: Normal.  Kidneys: No hydronephrosis, calculi or mass.  Urinary bladder: Unremarkable.  Reproductive organs: Mild prostatomegaly.  Gastrointestinal: The stomach duodenum are unremarkable. Small bowel  is normal in caliber. Mild bowel wall thickening noted in the sigmoid  colon. The appendix is normal.  Mesentery/Peritoneum: No territorial nodules, ascites or  pneumoperitoneum.  Lymph nodes: No lymphadenopathy.  Vasculature: Mild atherosclerotic calcifications. IVC is normal.   Other: None.     Bones and soft tissues: Visualized bony structures are consistent with  the patient's age.      Impression    IMPRESSION:  1.  Stable mass along the distal esophagus, extending to the bilateral  diaphragmatic crux and left pleura, consistent with known malignant  mesothelioma, with upstream esophageal dilatation. Interval decreased  left pleural effusion.  2.  Stable pleural based mass along the left fifth rib extending to  the left T4-5 neural foramen.  3.  No evidence of metastasis in  the abdomen or pelvis.    BRIEN WOOD MD   CT Chest w contrast    Narrative    EXAMINATION: CT CHEST W CONTRAST, 9/14/2020 3:24 PM    CLINICAL HISTORY: Mesothelioma, assess treatment response;  Mesothelioma (pleural) (H)    COMPARISON: 8/17/2020, 6/16/2020, CT CAP 4/21/2020.    TECHNIQUE: CT imaging obtained through the chest without contrast.  Coronal and axial MIP reformatted images obtained.     CONTRAST:  none.    FINDINGS:    Right chest wall Port-A-Cath tip at the right atrium. Normal heart  size, no pericardial effusion. Normal caliber thoracic aorta and  pulmonary artery. Normal thyroid. No suspicious lower cervical,  axillary, or mediastinal lymphadenopathy. Calcified left hilar lymph  nodes. Again noted is the large esophageal mass which measures  approximately 9 cm in length by 1.8 cm in width and results in marked  upstream esophageal dilatation. The mass appears to involve the left  diaphragmatic proximal and left pleural without significant change  compared to 8/17/2020.     Central tracheobronchial tree is widely patent. Right Bochdalek  hernia. Left pleural effusion with associated compressive atelectasis.  No pneumothorax. No suspicious pulmonary mass or nodule. Calcified  left pleural plaque. The pleural-based mass along the left fifth rib  seen on prior CT CAP 8/17/2020 is not well visualized on this study.  Common origin of the right brachiocephalic artery and left common  carotid artery from the aortic arch.    Evaluation of the upper abdomen is limited. Multiple stable hepatic  cysts. Incompletely visualized bilateral mild-moderate peripelvic  cysts, left greater than right.    BONES: Osteopenic appearance of the bones. No acute or suspicious  osseous abnormality.      Impression    IMPRESSION:  1. Essentially unchanged size and appearance of the known mesothelioma  of the distal esophagus with extension along the left diaphragmatic  crux and left pleura.  2. Markedly dilated and fluid  filled upstream esophagus.  3. The pleural-based mass along the left fifth rib seen on prior CT  CAP 8/17/2020 is not well seen on this study, essentially secondary to  slice thickness.    I have personally reviewed the examination and initial interpretation  and I agree with the findings.    DALLAS VARGAS MD   CT Soft tissue neck w contrast*    Narrative    CT SOFT TISSUE NECK W CONTRAST 9/14/2020 3:24 PM    History:  to look at throat/ upper esophagus for stricture.;  Mesothelioma (pleural) (H)  ICD-10: Mesothelioma (pleural) (H)      Comparison:  None     Technique: Following intravenous administration of nonionic iodinated  contrast medium, thin section helical CT images were obtained from the  skull base down to the level of the aortic arch.  Axial, coronal and  sagittal reformations were performed with 2-3 mm slice thickness  reconstruction. Images were reviewed in soft tissue, lung and bone  windows.    Contrast: 100 ml isovue 370    Findings: Suboptimal evaluation secondary to motion artifact.  Evaluation of the mucosal space demonstrates no evident abnormality in  the nasopharynx, oropharynx, hypopharynx or the glottis. The tongue  base appears normal. The major salivary glands appear unremarkable.  The thyroid gland appears normal.    There is no evident cervical lymphadenopathy. The fascial spaces in  the neck are intact bilaterally. The major vascular structures in the  neck appear unremarkable.    Evaluation of the osseous structures demonstrate no worrisome lytic or  sclerotic lesion. Moderate bilaterally neural foraminal stenosis C5-6  and mild left neural foraminal stenosis at C6-7. The visualized  paranasal sinuses are clear. The mastoid air cells are clear.     The visualized lung apices are clear. Markedly distended and  fluid-filled esophagus, better characterized on same-day chest CT.       Impression    Impression:  1. The proximal cervical esophagus is effaced, which could be  normal.  For optimal evaluation of stricture, consider endoscopy or esophagram.  2. Markedly distended and fluid-filled esophagus, better characterized  on same-day chest CT.    I have personally reviewed the examination and initial interpretation  and I agree with the findings.    TONIA BARAHONA MD        PHYSICAL EXAMINATION:  This was a telephone visit, and no exam was carried out.  The patient's voice is clear, and he is oriented x3.      ASSESSMENT AND PLAN:  Pleural mesothelioma:  The patient has pleural mesothelioma with esophageal infiltration.  His esophagus is completely closed.  They were unable to pass a stent.  We could consider stenting now if he is clearing some of his secretions.  The patient will see Dr. Martínez of Radiation Oncology at [place] tomorrow.  The patient will continue with ipilimumab and nivolumab.  I will see the patient back in 6 weeks with a CT of the chest, abdomen and pelvis.      cc:   TOY Mendiola, CNP   [facility]    [id number] [place] [id number]      Moe Martínez MD   [facility]    [address] [address]

## 2020-09-16 NOTE — NURSING NOTE
"Chief Complaint   Patient presents with     Port Draw     labs drawn from port by rn.  vs taken     Port accessed with 20 gauge 3/4\" gripper needle and labs drawn by rn.  Port flushed with NS and heparin.  Pt tolerated well.  VS taken.  Pt checked in for next appt.    Joann Ambrose RN      "

## 2020-09-16 NOTE — PROGRESS NOTES
"Pradeep Figueroa Jr. is a 75 year old male who is being evaluated via a billable telephone visit.      The patient has been notified of following:     \"This telephone visit will be conducted via a call between you and your physician/provider. We have found that certain health care needs can be provided without the need for a physical exam.  This service lets us provide the care you need with a short phone conversation.  If a prescription is necessary we can send it directly to your pharmacy.  If lab work is needed we can place an order for that and you can then stop by our lab to have the test done at a later time.    Telephone visits are billed at different rates depending on your insurance coverage. During this emergency period, for some insurers they may be billed the same as an in-person visit.  Please reach out to your insurance provider with any questions.    If during the course of the call the physician/provider feels a telephone visit is not appropriate, you will not be charged for this service.\"    Patient has given verbal consent for Telephone visit?  Yes    What phone number would you like to be contacted at? [phone]    How would you like to obtain your AVS? MyChart     I have reviewed and updated the patient's allergies and medication list. Patient was asked to provide any patient recorded vital signs, height and/or weight.  Please see in \"Patient Reported Vital Signs\" tab information.        Concerns: Patient has no new concerns.        Refills: None           ROSALINE Brian          Phone call duration:  21 minutes    Myles Breen MD      TELEPHONE VISIT       CHIEF COMPLAINT:  Pleural mesothelioma.      HISTORY OF PRESENT ILLNESS:  The patient returned for day 1 of cycle 7 of ipilimumab and nivolumab today.  He had his treatment at [place].  This is a telephone visit.      The patient feels \"infinitely better\" than he did last week when I spoke to him.  The diarrhea and loose " stools have resolved, and he is having one stool per day.  He attributes this to the new formulation that [facility] has delivered to him.  His gagging and retching seem to be better.  He feels that some of his secretions are getting through his esophageal stricture.  He will see Radiation Oncology tomorrow and discuss with Dr. Martínez at [place] the possibility of palliative radiation to open up the tight stricture in his esophagus.  The patient's laboratories today are relatively unchanged, although the total protein in his blood has gone up.      I discussed with him that we would continue with his current therapy, and I would see him back with a CT scan in 6 weeks.  The patient will receive his next dose of Opdivo in 2 weeks, and I will have a virtual visit for him with Rajni Drummond at [facility].      REVIEW OF SYSTEMS:  A 10 point review of systems is relatively noncontributory.  He still has some difficulty clearing his secretions, but that has improved.  A 10 point review of systems was carried out.      LABORATORY:  Comprehensive metabolic panel and CBC were essentially normal.  Albumin was slightly low, but stable at 2.9. CBC RESULTS:   Recent Labs   Lab Test 09/16/20  0728   WBC 10.1   RBC 4.57   HGB 13.1*   HCT 41.0   MCV 90   MCH 28.7   MCHC 32.0   RDW 16.1*        Last Comprehensive Metabolic Panel:  Sodium   Date Value Ref Range Status   09/16/2020 136 133 - 144 mmol/L Final     Potassium   Date Value Ref Range Status   09/16/2020 4.4 3.4 - 5.3 mmol/L Final     Chloride   Date Value Ref Range Status   09/16/2020 105 94 - 109 mmol/L Final     Carbon Dioxide   Date Value Ref Range Status   09/16/2020 27 20 - 32 mmol/L Final     Anion Gap   Date Value Ref Range Status   09/16/2020 4 3 - 14 mmol/L Final     Glucose   Date Value Ref Range Status   09/16/2020 141 (H) 70 - 99 mg/dL Final     Urea Nitrogen   Date Value Ref Range Status   09/16/2020 17 7 - 30 mg/dL Final      Creatinine   Date Value Ref Range Status   09/16/2020 0.64 (L) 0.66 - 1.25 mg/dL Final     GFR Estimate   Date Value Ref Range Status   09/16/2020 >90 >60 mL/min/[1.73_m2] Final     Comment:     Non  GFR Calc  Starting 12/18/2018, serum creatinine based estimated GFR (eGFR) will be   calculated using the Chronic Kidney Disease Epidemiology Collaboration   (CKD-EPI) equation.       Calcium   Date Value Ref Range Status   09/16/2020 8.7 8.5 - 10.1 mg/dL Final     Bilirubin Total   Date Value Ref Range Status   09/16/2020 0.2 0.2 - 1.3 mg/dL Final     Alkaline Phosphatase   Date Value Ref Range Status   09/16/2020 45 40 - 150 U/L Final     ALT   Date Value Ref Range Status   09/16/2020 31 0 - 70 U/L Final     AST   Date Value Ref Range Status   09/16/2020 14 0 - 45 U/L Final        IMAGING:  No imaging was carried out today.   Recent Results (from the past 744 hour(s))   CT Chest/Abdomen/Pelvis w Contrast    Narrative    EXAMINATION: CT CHEST/ABDOMEN/PELVIS W CONTRAST, 8/17/2020 2:13 PM    TECHNIQUE: Helical CT images from the thoracic inlet through the  symphysis pubis were obtained with intravenous contrast. Contrast  dose: 78 ml isovue 370    COMPARISON: CT 6/16/2020 and PET/CT dated 3/10/20,    HISTORY: Mesothelioma (pleural) (H).    FINDINGS:    Lower neck: Left internal jugular lymph node measuring 0.7 cm(3/32).    Chest:   There is a large enhancing soft tissue density mass measuring 1.8 x 3  x 8.1 cm (AP, T, CC) along the distal esophagus located between the  left atrium and descending thoracic aorta causing compression of the  esophagus and proximal dilatation. Inferiorly the mass extends to the  level of the gastroesophageal junction and extends to bilateral  diaphragmatic crux and left parietal pleura (series 3, image 264).  There is moderate pleural effusion, decreased compared to prior study.    Redemonstrated enhancing pleural-based mass in the posterior left  upper lobe along the left  fifth rib with extension to the T4-5 neural  foramen (3/67).  Lungs: No suspicious nodules, focal consolidation or mass. No pleural  effusion or pneumothorax.  Mediastinum: No lymphadenopathy by size criteria.  Heart and great vessels: Heart is normal in size and there is no  pericardial effusion. Aorta and main pulmonary artery are within  normal limits in caliber.    Abdomen and pelvis:  Liver: Stable hepatic cysts. No enhancing masses.  Biliary/Gallbladder: No CT evidence of calculi, wall thickening or  pericholecystic fluid. No intra or extrahepatic biliary dilatation.  Spleen: Normal size. No focal lesions.  Pancreas: No evidence of pancreatic mass or peripancreatic fluid.  Adrenals: Normal.  Kidneys: No hydronephrosis, calculi or mass.  Urinary bladder: Unremarkable.  Reproductive organs: Mild prostatomegaly.  Gastrointestinal: The stomach duodenum are unremarkable. Small bowel  is normal in caliber. Mild bowel wall thickening noted in the sigmoid  colon. The appendix is normal.  Mesentery/Peritoneum: No territorial nodules, ascites or  pneumoperitoneum.  Lymph nodes: No lymphadenopathy.  Vasculature: Mild atherosclerotic calcifications. IVC is normal.   Other: None.     Bones and soft tissues: Visualized bony structures are consistent with  the patient's age.      Impression    IMPRESSION:  1.  Stable mass along the distal esophagus, extending to the bilateral  diaphragmatic crux and left pleura, consistent with known malignant  mesothelioma, with upstream esophageal dilatation. Interval decreased  left pleural effusion.  2.  Stable pleural based mass along the left fifth rib extending to  the left T4-5 neural foramen.  3.  No evidence of metastasis in the abdomen or pelvis.    BRIEN WOOD MD   CT Chest w contrast    Narrative    EXAMINATION: CT CHEST W CONTRAST, 9/14/2020 3:24 PM    CLINICAL HISTORY: Mesothelioma, assess treatment response;  Mesothelioma (pleural) (H)    COMPARISON: 8/17/2020, 6/16/2020,  CT CAP 4/21/2020.    TECHNIQUE: CT imaging obtained through the chest without contrast.  Coronal and axial MIP reformatted images obtained.     CONTRAST:  none.    FINDINGS:    Right chest wall Port-A-Cath tip at the right atrium. Normal heart  size, no pericardial effusion. Normal caliber thoracic aorta and  pulmonary artery. Normal thyroid. No suspicious lower cervical,  axillary, or mediastinal lymphadenopathy. Calcified left hilar lymph  nodes. Again noted is the large esophageal mass which measures  approximately 9 cm in length by 1.8 cm in width and results in marked  upstream esophageal dilatation. The mass appears to involve the left  diaphragmatic proximal and left pleural without significant change  compared to 8/17/2020.     Central tracheobronchial tree is widely patent. Right Bochdalek  hernia. Left pleural effusion with associated compressive atelectasis.  No pneumothorax. No suspicious pulmonary mass or nodule. Calcified  left pleural plaque. The pleural-based mass along the left fifth rib  seen on prior CT CAP 8/17/2020 is not well visualized on this study.  Common origin of the right brachiocephalic artery and left common  carotid artery from the aortic arch.    Evaluation of the upper abdomen is limited. Multiple stable hepatic  cysts. Incompletely visualized bilateral mild-moderate peripelvic  cysts, left greater than right.    BONES: Osteopenic appearance of the bones. No acute or suspicious  osseous abnormality.      Impression    IMPRESSION:  1. Essentially unchanged size and appearance of the known mesothelioma  of the distal esophagus with extension along the left diaphragmatic  crux and left pleura.  2. Markedly dilated and fluid filled upstream esophagus.  3. The pleural-based mass along the left fifth rib seen on prior CT  CAP 8/17/2020 is not well seen on this study, essentially secondary to  slice thickness.    I have personally reviewed the examination and initial interpretation  and I  agree with the findings.    DALLAS VARGAS MD   CT Soft tissue neck w contrast*    Narrative    CT SOFT TISSUE NECK W CONTRAST 9/14/2020 3:24 PM    History:  to look at throat/ upper esophagus for stricture.;  Mesothelioma (pleural) (H)  ICD-10: Mesothelioma (pleural) (H)      Comparison:  None     Technique: Following intravenous administration of nonionic iodinated  contrast medium, thin section helical CT images were obtained from the  skull base down to the level of the aortic arch.  Axial, coronal and  sagittal reformations were performed with 2-3 mm slice thickness  reconstruction. Images were reviewed in soft tissue, lung and bone  windows.    Contrast: 100 ml isovue 370    Findings: Suboptimal evaluation secondary to motion artifact.  Evaluation of the mucosal space demonstrates no evident abnormality in  the nasopharynx, oropharynx, hypopharynx or the glottis. The tongue  base appears normal. The major salivary glands appear unremarkable.  The thyroid gland appears normal.    There is no evident cervical lymphadenopathy. The fascial spaces in  the neck are intact bilaterally. The major vascular structures in the  neck appear unremarkable.    Evaluation of the osseous structures demonstrate no worrisome lytic or  sclerotic lesion. Moderate bilaterally neural foraminal stenosis C5-6  and mild left neural foraminal stenosis at C6-7. The visualized  paranasal sinuses are clear. The mastoid air cells are clear.     The visualized lung apices are clear. Markedly distended and  fluid-filled esophagus, better characterized on same-day chest CT.       Impression    Impression:  1. The proximal cervical esophagus is effaced, which could be normal.  For optimal evaluation of stricture, consider endoscopy or esophagram.  2. Markedly distended and fluid-filled esophagus, better characterized  on same-day chest CT.    I have personally reviewed the examination and initial interpretation  and I agree with the  findings.    TONIA BARAHONA MD        PHYSICAL EXAMINATION:  This was a telephone visit, and no exam was carried out.  The patient's voice is clear, and he is oriented x3.      ASSESSMENT AND PLAN:  Pleural mesothelioma:  The patient has pleural mesothelioma with esophageal infiltration.  His esophagus is completely closed.  They were unable to pass a stent.  We could consider stenting now if he is clearing some of his secretions.  The patient will see Dr. Martínez of Radiation Oncology at [place] tomorrow.  The patient will continue with ipilimumab and nivolumab.  I will see the patient back in 6 weeks with a CT of the chest, abdomen and pelvis.      cc:   Rajni Drummond, APRN, CNP   [facility]    [id number] [place], [place] [id number]      Moe Martínez MD   [facility] Radiation/Oncology    [address] [place], [place] [id number]

## 2020-09-16 NOTE — PROGRESS NOTES
[facility]  DEPARTMENT OF RADIATION ONCOLOGY  CONSULTATION NOTE    Name: Pradeep Figueroa Jr. MRN: [id number]   : 1945 (75 year old)  Date of Service: 2020 Referring: Dr. Myles Breen       Diagnosis and Cancer Staging  No matching staging information was found for the patient.     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    History of Present Illness   Pradeep Figueroa Jr.is a 74 yo male with a history of epilepsy who presented in 2019 with progressive dysphagia. Work up by GI was difficult with initial finding of achalasia. CT chest/abd/pelvis 20 showed significant circumferential mid-distal esophageal thickening with upstream dilation which was atypical for type II achalasia.  He was found to have an incidental moderate sized left sided pleural effusion and some thickening in the parietal pleural lining and the right lower lobe and also an esophageal thickening of the pleural lining in the mediastinum around the esophagus.Further endoscopy and EUS showed esophageal stricture with thickening 30 cm from incisors within the mediastinum but outside the esophageal wall.  Esophageal biopsy on  revealed mesothelioma, epithelioid type based on the limited biopsy, with loss of BAP1 protein expression.     Treatment:   3/18/2020 began pemetrexed/carboplatin/bevacizumab  Underwent PEG tube placement 3/30/20 for severe nausea, vomiting, due to mesothelioma involvement of esophagus. CT scan after 2 cycles with overall stable disease  Cycle 3 and 4  Pemetrexed/carboplatin/bevacizumab completed, with the fourth cycle on 2020.  He tolerated chemotherapy fairly well, with mild fatigue, nausea and diarrhea.  He continued to have mild exertional shortness of breath that was stable.    CT chest with contrast on 2020 (following 4 cycles of chemotherapy) revealed:   1. No significant change  in the appearance of the plaque-like  enhancing pleural-based mass along the posterior left upper  lobe/paraspinous region with local invasion into the left T4-5 neural  foramen and sclerotic change of the posterior left fourth rib  suggestive of disease involvement. Additional less conspicuous  pleural-based plaques are better delineated on comparison PET CT  3/10/2020.  2. No significant change in the circumferential distal esophageal wall  thickening (demonstrated FDG avidity on comparison PET CT) and  upstream esophageal dilation.  3. Increased size of the large left pleural effusion. Stable trace  right pleural effusion.     In view of the lack of response to chemotherapy, in June 2020 the patient was begun on immunotherapy with ipilimumab and nivolumab.    The patient had difficulty with nausea and diarrhea after beginning immunotherapy but it was uncertain whether this was related to immunotherapy or his PEG tube feedings.  Most recently his diarrhea has resolved.  He recently had difficulty with the able to swallow his saliva.  However he believes this resolved in the past week after a change in his tube feeding formula.    CT CHEST WITH CONTRAST : 9/14/2020  IMPRESSION:  1. Essentially unchanged size and appearance of the known mesothelioma  of the distal esophagus with extension along the left diaphragmatic  crux and left pleura.  2. Markedly dilated and fluid filled upstream esophagus.  3. The pleural-based mass along the left fifth rib seen on prior CT  CAP 8/17/2020 is not well seen on this study, essentially secondary to  slice thickness.    Mr. Figueroa is seen today in Radiation Oncology consultation for consideration of palliative esophageal irradiation.    Past Medical History:  Past Medical History:   Diagnosis Date     History of immunotherapy     Ipilimumab/Nivolumab     Mesothelioma (H) 02/20/2020     Personal history of chemotherapy     Pemetrexed/Carboplatin/Bevacizumab     Seizures (H)      Patient reports most recent seizure in 2013          Past Surgical History:  Past Surgical History:   Procedure Laterality Date     ENDOSCOPIC ULTRASOUND UPPER GASTROINTESTINAL TRACT (GI) N/A 2/20/2020    Procedure: ENDOSCOPIC ULTRASOUND WITH FINE NEEDLE ASPIRATION, ESOPHAGOSCOPY / UPPER GASTROINTESTINAL TRACT (GI) WITH BIOPSY  BIOPSY;  Surgeon: Salazar Sepulveda MD;  Location: [facility] OR     INSERT PORT VASCULAR ACCESS Right 3/17/2020    Procedure: INSERTION, VASCULAR ACCESS PORT RIGHT INTERNAL JUGULAR;  Surgeon: Sandeep Ramirez DO;  Location: [facility] OR     TONSILLECTOMY            Chemotherapy History: Yes.  Radiation History: No.  Implanted Cardiac Devices: No.  Implanted Chest Wall Infusion Port: Yes.    Medications:  Current Outpatient Medications   Medication Sig Dispense Refill     albuterol (PROAIR HFA/PROVENTIL HFA/VENTOLIN HFA) 108 (90 Base) MCG/ACT inhaler Inhale 2 puffs into the lungs every 6 hours as needed for shortness of breath / dyspnea or wheezing 1 Inhaler 1     cholecalciferol 25 MCG (1000 UT) TABS Take 1,000 Units by mouth every 24 hours       folic acid (FOLVITE) 1 MG tablet Take 1 tablet (1 mg) by mouth daily 90 tablet 1     lamoTRIgine (LAMICTAL) 200 MG tablet Take 200 mg by mouth 2 times daily        Loperamide HCl (IMODIUM A-D PO) Take by mouth as needed       LORazepam (ATIVAN) 0.5 MG tablet Take 1 tablet (0.5 mg) by mouth every 4 hours as needed (Anxiety, Nausea/Vomiting or Sleep) 30 tablet 3     OLANZapine (ZYPREXA) 2.5 MG tablet Take 1 tablet (2.5 mg) by mouth At Bedtime 30 tablet 1     omeprazole (PRILOSEC) 2 mg/mL suspension Take 10 mLs (20 mg) by mouth 2 times daily 400 mL 1     acetaminophen (TYLENOL) 325 MG tablet Take 2 tablets (650 mg) by mouth every 4 hours as needed for mild pain (Patient not taking: Reported on 9/17/2020) 50 tablet 0     diphenoxylate-atropine (LOMOTIL) 2.5-0.025 MG tablet Take 1 tablet by mouth 4 times daily as needed for diarrhea (Patient not taking:  Reported on 9/17/2020) 120 tablet 0     Nutritional Supplements (ISOSOURCE 1.5 DIMA) LIQD Take 2 Cans by mouth 3 times daily 84 Can 11     OLANZapine (ZYPREXA) 2.5 MG tablet Take 1 tablet (2.5 mg) by mouth At Bedtime 30 tablet 4     ondansetron (ZOFRAN-ODT) 8 MG ODT tab Take 1 tablet (8 mg) by mouth every 8 hours as needed for nausea (Patient not taking: Reported on 9/17/2020) 60 tablet 1     opium tincture 10 MG/ML (1%) liquid Take 1 mL (10 mg) by mouth every 4 hours as needed for moderate to severe pain (Patient not taking: Reported on 9/17/2020) 473 mL 0     oxyCODONE (ROXICODONE) 5 MG tablet Take 1-2 tablets (5-10 mg) by mouth every 4 hours as needed for moderate to severe pain (Patient not taking: Reported on 9/17/2020) 30 tablet 0     prochlorperazine (COMPAZINE) 10 MG tablet Take 1 tablet (10 mg) by mouth every 6 hours as needed (Nausea/Vomiting) (Patient not taking: Reported on 9/17/2020) 30 tablet 3       Allergies:  No Known Allergies    Social History:  Social History     Tobacco Use     Smoking status: Never Smoker     Smokeless tobacco: Never Used   Substance Use Topics     Alcohol use: Not Currently     Drug use: Never       Family History:  Family History   Problem Relation Age of Onset     Lung Cancer Mother         History of smoking       Review of Systems (supplemental)   A 10-point review of systems was performed. Pertinent findings are noted in the HPI.    Physical Exam   KPS: 70.  ECOG Status: 2 (Ambulatory and capable of all selfcare but unable to carry out any work activities; may need help with IADLs up and about > 50% of waking hours)  Vitals: /80 (BP Location: Left arm, Patient Position: Chair, Cuff Size: Adult Regular)   Pulse 103   Temp 98.2  F (36.8  C) (Oral)   Resp 18   Wt 55.8 kg (123 lb)   SpO2 96%   BMI 20.47 kg/m    Pain: 0/10  General: Alert, oriented 75-year-old male who appears his stated age and is in no distress.  HEENT: PERRLA.  EOM intact.  There is no nystagmus  or jaundice.  Oral mucosa and posterior pharynx are normal and teeth are in good repair.  Neck: No thyroid enlargement, nodules or masses.  Lymphatic: No cervical, supraclavicular, axillary or inguinal lymphadenopathy.  Lungs: Decreased breath sounds left lower lung.  Heart:  RRR.  No murmur or gallop auscultated.  Abd: Soft and nontender.  There is no hepatosplenomegaly or masses palpated.  Ext: No cyanosis, clubbing or edema.  Neuro: Grossly intact.    Imaging/Path/Labs   Imaging: See HPI  Path: See HPI  Labs: See HPI    Assessment    1.  Mesothelioma with distal esophageal obstruction.    Plan   I have had a 50-minute discussion with Mr. Figueroa and his wife, with more than half the time spent in counseling coordination of care.  I reviewed the most recent chest CT images with them today.  Extrinsic compression of the distal esophagus from pleural mesothelioma appears to be a rare phenomenon.  The question is whether giving palliative radiation therapy to the lower esophagus will allow placement of an esophageal stent.  I would like to discuss this with Dr. Radha Morales, who performed esophagogastroscopy and percutaneous endoscopic gastrostomy tube insertion on 3/31/2020, to get her opinion regarding the feasibility of esophageal stent placement in this patient.  Today we discussed the potential side effects of palliative esophageal irradiation, including symptoms of esophagitis.  Currently the patient is hesitant to undergo a procedure that may result in even short-term esophageal side effects, given the nausea and difficulty with handling his oral secretions appears to have resolved with the change in his feeding tube formula.    At the completion of our discussion the patient and his wife felt their questions had been addressed.  We will contact them after I am able to review his status with Dr. Morales.    Thank you again for the opportunity to participate in Yossi Figueroa's care.  If any additional questions arise  please not hesitate to contact me.    Moe Martínez MD  Radiation Oncology  [facility] Department of Radiation Oncology

## 2020-09-16 NOTE — PROGRESS NOTES
Infusion Nursing Note:  Pradeep Figueroa Jr. presents today for Cycle 6 Opdivo and Yervoy.    Patient will see Dr Breen today after infusion      Note: Yossi feels well today.  He says his diarrhea is resolved as is his SOB.  Will proceed with treatment as planned.    Intravenous Access:  Implanted Port.    Treatment Conditions:  Lab Results   Component Value Date    HGB 13.1 09/16/2020     Lab Results   Component Value Date    WBC 10.1 09/16/2020      Lab Results   Component Value Date    ANEU 7.9 09/16/2020     Lab Results   Component Value Date     09/16/2020      Lab Results   Component Value Date     09/16/2020                   Lab Results   Component Value Date    POTASSIUM 4.4 09/16/2020           Lab Results   Component Value Date    MAG 2.3 08/17/2020            Lab Results   Component Value Date    CR 0.64 09/16/2020                   Lab Results   Component Value Date    DIMA 8.7 09/16/2020                Lab Results   Component Value Date    BILITOTAL 0.2 09/16/2020           Lab Results   Component Value Date    ALBUMIN 2.9 09/16/2020                    Lab Results   Component Value Date    ALT 31 09/16/2020           Lab Results   Component Value Date    AST 14 09/16/2020       Results reviewed, labs MET treatment parameters, ok to proceed with treatment.      Post Infusion Assessment:  Patient tolerated infusion without incident.       Discharge Plan:   Patient declined prescription refills.  AVS to patient via [employer].  Patient will return in two weeks for cycle 7.  A message was sent to scheduling to make this appointment at [facility] and call pt with the date and time.  Pt is aware this request was made.   Face to Face time: 0.    BRIE Goldstein Jr.      1.  Has the patient received the information for the influenza vaccine? YES    2.  Does the patient have any of the following contraindications?     Allergy to eggs?  No     Allergic reaction to previous  influenza vaccines?  No     Any other problems to previous influenza vaccines?  No     Paralyzed by Guillain-Concepcion syndrome?  No     Currently pregnant? NO     Current moderate or severe illness?  No     Allergy to contact lens solution?  No    3.  The vaccine has been administered in the usual fashion and the patient was instructed to wait 15 minutes before leaving the building in the event of an allergic reaction: YES    Vaccination given by Mckenna Pretty RN.  Recorded by Mckenna Pretty, RN

## 2020-09-17 ENCOUNTER — PRE VISIT (OUTPATIENT)
Dept: RADIATION ONCOLOGY | Facility: CLINIC | Age: 75
End: 2020-09-17

## 2020-09-17 ENCOUNTER — OFFICE VISIT (OUTPATIENT)
Dept: RADIATION ONCOLOGY | Facility: CLINIC | Age: 75
End: 2020-09-17
Attending: INTERNAL MEDICINE
Payer: MEDICARE

## 2020-09-17 VITALS
RESPIRATION RATE: 18 BRPM | DIASTOLIC BLOOD PRESSURE: 80 MMHG | TEMPERATURE: 98.2 F | HEART RATE: 103 BPM | WEIGHT: 123 LBS | SYSTOLIC BLOOD PRESSURE: 117 MMHG | OXYGEN SATURATION: 96 % | BODY MASS INDEX: 20.47 KG/M2

## 2020-09-17 DIAGNOSIS — C45.9 MESOTHELIOMA (H): Primary | ICD-10-CM

## 2020-09-17 PROCEDURE — 99205 OFFICE O/P NEW HI 60 MIN: CPT | Performed by: RADIOLOGY

## 2020-09-17 ASSESSMENT — PAIN SCALES - GENERAL: PAINLEVEL: NO PAIN (0)

## 2020-09-17 NOTE — NURSING NOTE
"INITIAL PATIENT ASSESSMENT      Diagnosis: Mesothelioma     Prior radiation therapy: None    Prior chemotherapy:   Protocol: Pemetrexed/Carboplatin/Bevacizumab  Facility: [facility] - Dr. Breen  Dates: 3/18/2020 - 5/28/2020      Protocol: Ipilimumab/Nivolumab  Facility: [facility] - Dr. Breen  Dates: 6/24/2020 - 9/16/2020 (most recent infusion)      Prior hormonal therapy: No    Pain Eval:  Denies    Psychosocial  Living arrangements: Lives at home in [place], presents to clinic with wife Nataliia  Fall Risk: independent  [facility] Falls Risk Screening Completed: Yes Result: Negative   referral needs: Not needed    Advanced Directive: Yes - Location: family has copy  Implantable Cardiac Device: No  Authorization To Share Protected Health Information: YES - Date: 9/17/2020      Reproductive note: Not recorded for male patient    Review of Systems     Constitutional: Positive for weight loss. Negative for chills, diaphoresis, fever and malaise/fatigue.        Weight fluctuations noted, most recent weight loss of five pounds from 8/5/2020.   HENT: Negative.         Patient reports difficulty swallowing, patient reports last time he was able to swallow well was October, 2019.  Patient reports he has a PEG tube, recent formula change to Peptamen with goal of six cartons per day.  Patient reports depending on number of clinic appointments, able to administer three to six cartons per day.  Patient reports sucking on popsicles as needed for dry mouth relief and metallic taste in mouth, reviewed use of baking soda and salt rinse with patient and wife.  Patient reports unable to swallow water upon attempts \"it hits a point and comes right back up\".     Eyes: Negative.    Respiratory: Positive for cough and shortness of breath. Negative for hemoptysis, sputum production and wheezing.         Patient reports intermittent coughing \"more because I get a tickle in " "my throat\", patient reports shortness of breath with minimal activity, reports using inhaler two to three times per day.  Patient reports sleeping in bed with head of bed elevated to 45 degree angle, reports he is able to lay flat for short periods of time.  Patient denies use of supplemental oxygen at home.   Cardiovascular: Negative.    Gastrointestinal: Negative.         Patient reports diarrhea has resolved this week, denies use of anti-diarrheal medication.   Genitourinary: Negative.    Musculoskeletal: Negative.    Skin: Negative.    Neurological: Positive for headaches. Negative for dizziness, tingling, tremors, sensory change, speech change, focal weakness, seizures, loss of consciousness and weakness.        Patient reports intermittent headaches \"nothing consistent\".  Patient reports last seizure activity was in 2013.   Endo/Heme/Allergies: Negative.    Psychiatric/Behavioral: Negative.        Nurse face-to-face time: Level 5:  over 15 min face to face time.    Cat Babcock RN BSN OCN        "

## 2020-09-17 NOTE — LETTER
2020         RE: Pradeep Figueroa Jr.  [address] [place] [address]        Dear Colleague,    Thank you for referring your patient, Pradeep Figueroa Jr., to the [facility]. Please see a copy of my visit note below.      [facility]  DEPARTMENT OF RADIATION ONCOLOGY  CONSULTATION NOTE    Name: Pradeep Figueroa Jr. MRN: [id number]   : 1945 (75 year old)  Date of Service: 2020 Referring: Dr. Myles Breen       Diagnosis and Cancer Staging  No matching staging information was found for the patient.     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    History of Present Illness   Pradeep Figueroa Jr.is a 74 yo male with a history of epilepsy who presented in 2019 with progressive dysphagia. Work up by GI was difficult with initial finding of achalasia. CT chest/abd/pelvis 20 showed significant circumferential mid-distal esophageal thickening with upstream dilation which was atypical for type II achalasia.  He was found to have an incidental moderate sized left sided pleural effusion and some thickening in the parietal pleural lining and the right lower lobe and also an esophageal thickening of the pleural lining in the mediastinum around the esophagus.Further endoscopy and EUS showed esophageal stricture with thickening 30 cm from incisors within the mediastinum but outside the esophageal wall.  Esophageal biopsy on  revealed mesothelioma, epithelioid type based on the limited biopsy, with loss of BAP1 protein expression.     Treatment:   3/18/2020 began pemetrexed/carboplatin/bevacizumab  Underwent PEG tube placement 3/30/20 for severe nausea, vomiting, due to mesothelioma involvement of esophagus. CT scan after 2 cycles with overall stable disease  Cycle 3 and 4  Pemetrexed/carboplatin/bevacizumab completed, with the fourth cycle on 2020.  He  tolerated chemotherapy fairly well, with mild fatigue, nausea and diarrhea.  He continued to have mild exertional shortness of breath that was stable.    CT chest with contrast on 6/16/2020 (following 4 cycles of chemotherapy) revealed:   1. No significant change in the appearance of the plaque-like  enhancing pleural-based mass along the posterior left upper  lobe/paraspinous region with local invasion into the left T4-5 neural  foramen and sclerotic change of the posterior left fourth rib  suggestive of disease involvement. Additional less conspicuous  pleural-based plaques are better delineated on comparison PET CT  3/10/2020.  2. No significant change in the circumferential distal esophageal wall  thickening (demonstrated FDG avidity on comparison PET CT) and  upstream esophageal dilation.  3. Increased size of the large left pleural effusion. Stable trace  right pleural effusion.     In view of the lack of response to chemotherapy, in June 2020 the patient was begun on immunotherapy with ipilimumab and nivolumab.    The patient had difficulty with nausea and diarrhea after beginning immunotherapy but it was uncertain whether this was related to immunotherapy or his PEG tube feedings.  Most recently his diarrhea has resolved.  He recently had difficulty with the able to swallow his saliva.  However he believes this resolved in the past week after a change in his tube feeding formula.    CT CHEST WITH CONTRAST : 9/14/2020  IMPRESSION:  1. Essentially unchanged size and appearance of the known mesothelioma  of the distal esophagus with extension along the left diaphragmatic  crux and left pleura.  2. Markedly dilated and fluid filled upstream esophagus.  3. The pleural-based mass along the left fifth rib seen on prior CT  CAP 8/17/2020 is not well seen on this study, essentially secondary to  slice thickness.    Mr. Figueroa is seen today in Radiation Oncology consultation for consideration of palliative esophageal  irradiation.    Past Medical History:  Past Medical History:   Diagnosis Date     History of immunotherapy     Ipilimumab/Nivolumab     Mesothelioma (H) 02/20/2020     Personal history of chemotherapy     Pemetrexed/Carboplatin/Bevacizumab     Seizures (H)     Patient reports most recent seizure in 2013          Past Surgical History:  Past Surgical History:   Procedure Laterality Date     ENDOSCOPIC ULTRASOUND UPPER GASTROINTESTINAL TRACT (GI) N/A 2/20/2020    Procedure: ENDOSCOPIC ULTRASOUND WITH FINE NEEDLE ASPIRATION, ESOPHAGOSCOPY / UPPER GASTROINTESTINAL TRACT (GI) WITH BIOPSY  BIOPSY;  Surgeon: Salazar Sepulveda MD;  Location: [facility] OR     INSERT PORT VASCULAR ACCESS Right 3/17/2020    Procedure: INSERTION, VASCULAR ACCESS PORT RIGHT INTERNAL JUGULAR;  Surgeon: Sandeep Ramirez DO;  Location: [facility] OR     TONSILLECTOMY            Chemotherapy History: Yes.  Radiation History: No.  Implanted Cardiac Devices: No.  Implanted Chest Wall Infusion Port: Yes.    Medications:  Current Outpatient Medications   Medication Sig Dispense Refill     albuterol (PROAIR HFA/PROVENTIL HFA/VENTOLIN HFA) 108 (90 Base) MCG/ACT inhaler Inhale 2 puffs into the lungs every 6 hours as needed for shortness of breath / dyspnea or wheezing 1 Inhaler 1     cholecalciferol 25 MCG (1000 UT) TABS Take 1,000 Units by mouth every 24 hours       folic acid (FOLVITE) 1 MG tablet Take 1 tablet (1 mg) by mouth daily 90 tablet 1     lamoTRIgine (LAMICTAL) 200 MG tablet Take 200 mg by mouth 2 times daily        Loperamide HCl (IMODIUM A-D PO) Take by mouth as needed       LORazepam (ATIVAN) 0.5 MG tablet Take 1 tablet (0.5 mg) by mouth every 4 hours as needed (Anxiety, Nausea/Vomiting or Sleep) 30 tablet 3     OLANZapine (ZYPREXA) 2.5 MG tablet Take 1 tablet (2.5 mg) by mouth At Bedtime 30 tablet 1     omeprazole (PRILOSEC) 2 mg/mL suspension Take 10 mLs (20 mg) by mouth 2 times daily 400 mL 1     acetaminophen (TYLENOL) 325 MG tablet Take 2  tablets (650 mg) by mouth every 4 hours as needed for mild pain (Patient not taking: Reported on 9/17/2020) 50 tablet 0     diphenoxylate-atropine (LOMOTIL) 2.5-0.025 MG tablet Take 1 tablet by mouth 4 times daily as needed for diarrhea (Patient not taking: Reported on 9/17/2020) 120 tablet 0     Nutritional Supplements (ISOSOURCE 1.5 DIMA) LIQD Take 2 Cans by mouth 3 times daily 84 Can 11     OLANZapine (ZYPREXA) 2.5 MG tablet Take 1 tablet (2.5 mg) by mouth At Bedtime 30 tablet 4     ondansetron (ZOFRAN-ODT) 8 MG ODT tab Take 1 tablet (8 mg) by mouth every 8 hours as needed for nausea (Patient not taking: Reported on 9/17/2020) 60 tablet 1     opium tincture 10 MG/ML (1%) liquid Take 1 mL (10 mg) by mouth every 4 hours as needed for moderate to severe pain (Patient not taking: Reported on 9/17/2020) 473 mL 0     oxyCODONE (ROXICODONE) 5 MG tablet Take 1-2 tablets (5-10 mg) by mouth every 4 hours as needed for moderate to severe pain (Patient not taking: Reported on 9/17/2020) 30 tablet 0     prochlorperazine (COMPAZINE) 10 MG tablet Take 1 tablet (10 mg) by mouth every 6 hours as needed (Nausea/Vomiting) (Patient not taking: Reported on 9/17/2020) 30 tablet 3       Allergies:  No Known Allergies    Social History:  Social History     Tobacco Use     Smoking status: Never Smoker     Smokeless tobacco: Never Used   Substance Use Topics     Alcohol use: Not Currently     Drug use: Never       Family History:  Family History   Problem Relation Age of Onset     Lung Cancer Mother         History of smoking       Review of Systems (supplemental)   A 10-point review of systems was performed. Pertinent findings are noted in the HPI.    Physical Exam   KPS: 70.  ECOG Status: 2 (Ambulatory and capable of all selfcare but unable to carry out any work activities; may need help with IADLs up and about > 50% of waking hours)  Vitals: /80 (BP Location: Left arm, Patient Position: Chair, Cuff Size: Adult Regular)   Pulse  103   Temp 98.2  F (36.8  C) (Oral)   Resp 18   Wt 55.8 kg (123 lb)   SpO2 96%   BMI 20.47 kg/m    Pain: 0/10  General: Alert, oriented 75-year-old male who appears his stated age and is in no distress.  HEENT: PERRLA.  EOM intact.  There is no nystagmus or jaundice.  Oral mucosa and posterior pharynx are normal and teeth are in good repair.  Neck: No thyroid enlargement, nodules or masses.  Lymphatic: No cervical, supraclavicular, axillary or inguinal lymphadenopathy.  Lungs: Decreased breath sounds left lower lung.  Heart:  RRR.  No murmur or gallop auscultated.  Abd: Soft and nontender.  There is no hepatosplenomegaly or masses palpated.  Ext: No cyanosis, clubbing or edema.  Neuro: Grossly intact.    Imaging/Path/Labs   Imaging: See HPI  Path: See HPI  Labs: See HPI    Assessment    1.  Mesothelioma with distal esophageal obstruction.    Plan   I have had a 50-minute discussion with Mr. Figueroa and his wife, with more than half the time spent in counseling coordination of care.  I reviewed the most recent chest CT images with them today.  Extrinsic compression of the distal esophagus from pleural mesothelioma appears to be a rare phenomenon.  The question is whether giving palliative radiation therapy to the lower esophagus will allow placement of an esophageal stent.  I would like to discuss this with Dr. Radha Morales, who performed esophagogastroscopy and percutaneous endoscopic gastrostomy tube insertion on 3/31/2020, to get her opinion regarding the feasibility of esophageal stent placement in this patient.  Today we discussed the potential side effects of palliative esophageal irradiation, including symptoms of esophagitis.  Currently the patient is hesitant to undergo a procedure that may result in even short-term esophageal side effects, given the nausea and difficulty with handling his oral secretions appears to have resolved with the change in his feeding tube formula.    At the completion of our  discussion the patient and his wife felt their questions had been addressed.  We will contact them after I am able to review his status with Dr. Morales.    Thank you again for the opportunity to participate in Yossi Figueroa's care.  If any additional questions arise please not hesitate to contact me.    Moe Leger MD  Radiation Oncology  [facility] Department of Radiation Oncology    Again, thank you for allowing me to participate in the care of your patient.        Sincerely,        Moe Leger MD

## 2020-09-17 NOTE — PATIENT INSTRUCTIONS
What to expect at your Simulation visit:    You will meet with a Radiation Therapist and other team members who will be doing a planning session called a  simulation  with you. This process will determine your daily treatment.    ~ You will lie on a flat table and have a treatment planning CT scan.  It is important during the scan to hold very still and breathe normally.    ~ Your therapist may construct a body mold to help you hold still for your treatments.    ~ If you are having treatment to the head or neck area you will be fitted with a plastic mesh mask that fits very snugly over your face and neck.     ~ Your therapist will be taking some digital photos that will go in your treatment chart.      ~Your therapist will make marks on your skin and take measurements. Your therapist may ask you about making small tattoos (a permanent small dot) over these marks.  These marks are used to position you daily for your radiation therapy treatments. Please do not wash off any marks until all of your radiation therapy treatments are complete unless you are instructed to do so by your therapist.    ~ Once the simulation is completed it can take from 3 to 10 business days before you start radiation therapy treatments.    ~ You may meet with a nurse who will go over management of treatment side effects and self care during your treatments. The nurse will help to plan care with other departments and physicians if needed.    Please contact [facility] RN with questions or concerns following today's appointment: [phone].    Thank you!

## 2020-09-19 ENCOUNTER — TELEPHONE (OUTPATIENT)
Dept: RADIATION ONCOLOGY | Facility: CLINIC | Age: 75
End: 2020-09-19
Payer: MEDICARE

## 2020-09-20 NOTE — TELEPHONE ENCOUNTER
I telephoned Mr. Figueroa today to discuss the messages from  Dr. Morales, Dr. Sepulveda and Dr. Breen.  He greatly appreciated everyone responding so quickly.  He indicated he was leaning toward evaluation by Dr. Rose but wishes to discuss this first with Dr. Breen.  I encouraged this and asked him to keep me informed of his plans.    Moe Martínez MD  Radiation Oncology

## 2020-09-21 ENCOUNTER — PATIENT OUTREACH (OUTPATIENT)
Dept: GASTROENTEROLOGY | Facility: CLINIC | Age: 75
End: 2020-09-21

## 2020-09-21 DIAGNOSIS — K22.0 ACHALASIA: Primary | ICD-10-CM

## 2020-09-21 NOTE — PROGRESS NOTES
Advanced Endoscopy Internal Procedure form:    Referring/Requesting provider: Andrew Diane contact - thoracic    Procedure/Imaging/Clinic: EGD with esophageal stent placement and suturing   Physician: Derick   Timing: next available   Procedure length: 60 min   Anesthesia: Gen   Dx: esophageal stricture   Tier: 3   [facility]   Requested provider (if specified): Derick    History and physical within last 30 days?  PAC visit    OR orders placed.  Patient contacted and discussed procedure preop instructions, including PAC visit (requested by patient), covid test within 4 days which he will arrange in [facility] and no blood thinners.     He will await call from scheduling.    Sheila Bennett RN   BSN, HNBC, STAR-T  Advanced GI Service  Care Coordinator  Ph: [phone]  FAX: [phone]

## 2020-09-22 DIAGNOSIS — C45.0 MESOTHELIOMA (PLEURAL) (H): ICD-10-CM

## 2020-09-22 DIAGNOSIS — K21.9 GASTROESOPHAGEAL REFLUX DISEASE, ESOPHAGITIS PRESENCE NOT SPECIFIED: ICD-10-CM

## 2020-09-26 ENCOUNTER — NURSE TRIAGE (OUTPATIENT)
Dept: NURSING | Facility: CLINIC | Age: 75
End: 2020-09-26

## 2020-09-26 NOTE — TELEPHONE ENCOUNTER
"Spouse calling with patient. Reporting patient has had \"Vomiting and explosive diarrhea since yesterday afternoon.\" Reporting last immunotherapy treatment was on Wednesday 9/23/20.   Patient has taken anti nausea medication as directed with no improvement. Reporting 3-4 watery stools this morning. X 4 episodes of vomiting yesterday. Ongoing nausea. Caller reporting patient is receiving all feedings/fluids through G-tube. Stating with any fluid intake patient has diarrhea episode. Reporting very dry mouth, dizziness. Afebrile. Patient reporting smaller amount of urine out this morning.     Shawna Melendez RN  [facility]      COVID 19 Nurse Triage Plan/Patient Instructions    Please be aware that novel coronavirus (COVID-19) may be circulating in the community. If you develop symptoms such as fever, cough, or SOB or if you have concerns about the presence of another infection including coronavirus (COVID-19), please contact your health care provider or visit [email or web address].     Disposition/Instructions    ED Visit recommended. Follow protocol based instructions.     Bring Your Own Device:  Please also bring your smart device(s) (smart phones, tablets, laptops) and their charging cables for your personal use and to communicate with your care team during your visit.    Thank you for taking steps to prevent the spread of this virus.  o Limit your contact with others.  o Wear a simple mask to cover your cough.  o Wash your hands well and often.    Resources    [facility]: About COVID-19: [email or web address]    CDC: What to Do If You're Sick: www.cdc.gov/coronavirus/2019-ncov/about/steps-when-sick.html    CDC: Ending Home Isolation: www.cdc.gov/coronavirus/2019-ncov/hcp/disposition-in-home-patients.html     CDC: Caring for Someone: www.cdc.gov/coronavirus/2019-ncov/if-you-are-sick/care-for-someone.html     [facility]: Interim Guidance for Hospital Discharge to Home: " www.health.[facility].[place]/diseases/coronavirus/hcp/hospdischarge.pdf    [facility] clinical trials (COVID-19 research studies): [email or web address].[email or web address]/umn-clinical-trials     Below are the COVID-19 hotlines at the [place] Department of Health ([facility]). Interpreters are available.   o For health questions: Call 633-426-0769 or 1-692.949.3695 (7 a.m. to 7 p.m.)  o For questions about schools and childcare: Call 787-534-3302 or 1-553.553.4467 (7 a.m. to 7 p.m.)                     Reason for Disposition    [1] Drinking very little AND [2] dehydration suspected (e.g., no urine > 12 hours, very dry mouth, very lightheaded)    Additional Information    Negative: Shock suspected (e.g., cold/pale/clammy skin, too weak to stand, low BP, rapid pulse)    Negative: Difficult to awaken or acting confused (e.g., disoriented, slurred speech)    Negative: Sounds like a life-threatening emergency to the triager    Negative: [1] SEVERE abdominal pain (e.g., excruciating) AND [2] present > 1 hour    Negative: [1] SEVERE abdominal pain AND [2] age > 60    Negative: [1] Blood in the stool AND [2] moderate or large amount of blood    Negative: Black or tarry bowel movements  (Exception: chronic-unchanged  black-grey bowel movements AND is taking iron pills or peptobismol)    Negative: [1] Neutropenia known or suspected (e.g., recent cancer chemotherapy) AND [2] new onset of diarrhea    Protocols used: CANCER - DIARRHEA-A-

## 2020-09-28 ENCOUNTER — TELEPHONE (OUTPATIENT)
Dept: ONCOLOGY | Facility: CLINIC | Age: 75
End: 2020-09-28

## 2020-09-28 ENCOUNTER — PREP FOR PROCEDURE (OUTPATIENT)
Dept: GASTROENTEROLOGY | Facility: CLINIC | Age: 75
End: 2020-09-28

## 2020-09-28 ENCOUNTER — HOME INFUSION (PRE-WILLOW HOME INFUSION) (OUTPATIENT)
Dept: PHARMACY | Facility: CLINIC | Age: 75
End: 2020-09-28

## 2020-09-28 DIAGNOSIS — K22.2 ESOPHAGEAL STRICTURE: Primary | ICD-10-CM

## 2020-09-28 NOTE — PROGRESS NOTES
Nutrition Services:     Received phone call from Nataliia indicating that Yossi is currently admitted to the [facility] due to ongoing nausea, vomiting and diarrhea for the past 3 days.    She tells me that he is currently NPO as they perform GI workup and stool testing.      She tells me that this hospital does not carry Peptamen and inquires about alternative supply; [facility] does not provide further supply until discharged from hospital and they are running out.     RD contacted clinical nutrition manager from [facility], Pricilla Brown ([phone]) to inquire about supply.  Priiclla is looking to obtain supply from another [facility] facility.  Sh is confident that they will have this formula for Yossi by tomorrow.  In the mean time, Nataliia will be bringing 1-2 days worth of home supply while he is admitted.     CEDRICK requested Pricilla to call with questions.     Magaly Breaux RDN, [facility]  [phone]

## 2020-09-28 NOTE — TELEPHONE ENCOUNTER
Pt admitted to local hospital this am with diarrhea. Not stopping yet and pts wife would like Dr Breen to be aware and give guidance to hospitalist.

## 2020-09-29 ENCOUNTER — TELEPHONE (OUTPATIENT)
Dept: GASTROENTEROLOGY | Facility: CLINIC | Age: 75
End: 2020-09-29

## 2020-09-29 ENCOUNTER — PATIENT OUTREACH (OUTPATIENT)
Dept: ONCOLOGY | Facility: CLINIC | Age: 75
End: 2020-09-29

## 2020-09-29 ENCOUNTER — HOME INFUSION (PRE-WILLOW HOME INFUSION) (OUTPATIENT)
Dept: PHARMACY | Facility: CLINIC | Age: 75
End: 2020-09-29

## 2020-09-29 DIAGNOSIS — C45.0 MESOTHELIOMA (PLEURAL) (H): ICD-10-CM

## 2020-09-29 DIAGNOSIS — K21.9 GASTROESOPHAGEAL REFLUX DISEASE, ESOPHAGITIS PRESENCE NOT SPECIFIED: ICD-10-CM

## 2020-09-29 DIAGNOSIS — Z11.59 ENCOUNTER FOR SCREENING FOR OTHER VIRAL DISEASES: Primary | ICD-10-CM

## 2020-09-29 PROBLEM — K22.2 ESOPHAGEAL STRICTURE: Status: ACTIVE | Noted: 2020-09-29

## 2020-09-29 NOTE — TELEPHONE ENCOUNTER
Spoke to patient in regards to scheduled procedure. Informed patient he is scheduled with Dr. Sepulveda on 10/29/2020. Informed patient he will need an updated pre-op physical within 30 days of his procedure. Patient stated he is going to have this done locally along with his covid test 4 days before the procedure. Informed patient he will need a  and someone to monitor him for 24 hours after the procedure. Informed patient all scheduling details will be sent to the address listed on Epic. Address confirmed on this call.

## 2020-09-29 NOTE — PROGRESS NOTES
Called and left message in regards to some questions Nataliia had.  She states that Yossi is in the hospital in [place] with nausea and diarrhea.  They would like to have a conversation with  to see what other options are available.  Let Nataliia know that we will cancel his appointment tomorrow as he will still be inpatient and I will speak with  while in clinic.  She agreed with and verbalized understanding of the plan of care.

## 2020-09-29 NOTE — TELEPHONE ENCOUNTER
SUBJECTIVE/OBJECTIVE:                                                    Refill request receive for:  Omeprazole Oral Suspesion    Last refill per fax: 9/9/2020  Date of LOV r/t Medication: 8/17/2020  Future appt scheduled? Yes: 10/8/2020   Date faxed to clinic: 9/25/2020    RECENT LABS/VITALS                                                      No results for input(s): CHOL, HDL, LDL, TRIG, CHOLHDLRATIO in the last 38070 hours.  Lab Results   Component Value Date    AST 14 09/16/2020     Lab Results   Component Value Date    ALT 31 09/16/2020     No results found for: A1C  Creatinine   Date Value Ref Range Status   09/16/2020 0.64 (L) 0.66 - 1.25 mg/dL Final   ]  Potassium   Date Value Ref Range Status   09/16/2020 4.4 3.4 - 5.3 mmol/L Final   ]  TSH   Date Value Ref Range Status   08/17/2020 1.59 0.40 - 4.00 mU/L Final   08/05/2020 1.63 0.40 - 4.00 mU/L Final   07/22/2020 2.55 0.40 - 4.00 mU/L Final   07/08/2020 2.31 0.40 - 4.00 mU/L Final     BP Readings from Last 4 Encounters:   09/17/20 117/80   09/16/20 133/80   08/17/20 111/64   08/05/20 136/85       PLAN:                                                      Medication refilled per protocol: Sending to Rajni Drummond CNP to advise.    Ewa Paniagua CMA

## 2020-09-30 NOTE — PROGRESS NOTES
This is a recent snapshot of the patient's [facility] medical record.  For current drug dose and complete information and questions, call [phone] or In Basket pool, fv home infusion ([id number])  CSN Number:  [id number]

## 2020-10-01 ENCOUNTER — TELEPHONE (OUTPATIENT)
Dept: ONCOLOGY | Facility: CLINIC | Age: 75
End: 2020-10-01

## 2020-10-01 ENCOUNTER — NURSE TRIAGE (OUTPATIENT)
Dept: NURSING | Facility: CLINIC | Age: 75
End: 2020-10-01

## 2020-10-01 NOTE — TELEPHONE ENCOUNTER
" Wife calling  Pt Is having continued  nausea & diarrhea since he started new chemo 9/16/2020  She is calling to see how long the side effects can last.  She is most concerned about the yervoy,  author went  rx web site, \"commonly SE last 1-3 weeks\"  Urged wife to contact Oncologist in the morning.  Author did offer to page provider, she declined paging or triage.  Manisha Akers RN  [facility]    Reason for Disposition    Caller has medication question about med not prescribed by PCP and triager unable to answer question (e.g., compatibility with other med, storage)     Pt & wife will call oncologist in the am    Additional Information    Medication questions    Negative: Drug overdose and nurse unable to answer question    Negative: Caller requesting information not related to medicine    Negative: Caller requesting a prescription for Strep throat and has a positive culture result    Negative: Rash while taking a medication or within 3 days of stopping it    Negative: Immunization reaction suspected    Negative: [1] Asthma AND [2] having symptoms of asthma (cough, wheezing, etc)    Negative: MORE THAN A DOUBLE DOSE of a prescription or over-the-counter (OTC) drug    Negative: [1] DOUBLE DOSE (an extra dose or lesser amount) of over-the-counter (OTC) drug AND [2] any symptoms (e.g., dizziness, nausea, pain, sleepiness)    Negative: [1] DOUBLE DOSE (an extra dose or lesser amount) of prescription drug AND [2] any symptoms (e.g., dizziness, nausea, pain, sleepiness)    Negative: Took another person's prescription drug    Negative: [1] DOUBLE DOSE (an extra dose or lesser amount) of prescription drug AND [2] NO symptoms (Exception: a double dose of antibiotics)    Negative: Diabetes drug error or overdose (e.g., insulin or extra dose)    Negative: [1] Request for URGENT new prescription or refill of \"essential\" medication (i.e., likelihood of harm to patient if not taken) AND [2] triager unable to " fill per unit policy    Negative: [1] Prescription not at pharmacy AND [2] was prescribed today by PCP    Negative: Pharmacy calling with prescription questions and triager unable to answer question    Negative: Caller has urgent medication question about med that PCP prescribed and triager unable to answer question    Negative: Caller has NON-URGENT medication question about med that PCP prescribed and triager unable to answer question    Negative: Caller requesting a NON-URGENT new prescription or refill and triager unable to refill per unit policy    Protocols used: MEDICATION QUESTION CALL-A-AH, INFORMATION ONLY CALL-A-AH

## 2020-10-01 NOTE — TELEPHONE ENCOUNTER
Nutrition Services:     Received phone call from Nataliia today.  Yossi remains admitted at [facility].   She tells me that his GI tests came back negative and they started feeding him today.   She thinks they have him 1/2 carton of Peptamen 1.5 (his home supply) and within 2 hours he had diarrhea and nausea.    She calls me concerned that maybe it is the formula and immune therapy that is causing diarrhea.    Today the diarrhea seems more related to his feedings as he hasn't had IT.    She inquires about the next steps with his feedings.    CEDRICK ensured her that he will be taken care of by RD's inpatient at [facility].   CEDRICK is already in contact with [place] [facility] , Pricilla Brown.      She is very emotional regarding Yossi's health condition.  Nataliia appreciated the counseling.  Encouraged her to reach out to writer at any time.     Magaly Breaux RDN, LDN  [facility]  [phone]

## 2020-10-01 NOTE — TELEPHONE ENCOUNTER
Received call from spouse Nataliia stating pt is inpatient at [facility] due to n/v from his last infusion of Yervoy and Opdivo on 9/16. She is wondering how long symptoms are expected to last. Stated he was put on TF with resolution of symptoms but they tried to advance his diet with food and he immediately had n/d again.     Phone was cut off at this point, tried calling Nataliia back on her cell phone and it went straight to voicemail. Left her a message that if she wanted to connect with the on-call oncologist at [facility] she can certainly call back the same number and after hours nurses could have the on-call paged or if she is ok with waiting to hear back from Dr. Breen and [facility], this encounter is routed to them for follow-up.

## 2020-10-01 NOTE — PROGRESS NOTES
This is a recent snapshot of the patient's [facility] medical record.  For current drug dose and complete information and questions, call [phone] or In [facility] pool, fv home infusion ([id number])  CSN Number:  [id number]

## 2020-10-09 ENCOUNTER — PATIENT OUTREACH (OUTPATIENT)
Dept: GASTROENTEROLOGY | Facility: CLINIC | Age: 75
End: 2020-10-09

## 2020-10-11 ENCOUNTER — HOME INFUSION (PRE-WILLOW HOME INFUSION) (OUTPATIENT)
Dept: PHARMACY | Facility: CLINIC | Age: 75
End: 2020-10-11

## 2020-10-11 DIAGNOSIS — C45.0 MESOTHELIOMA (PLEURAL) (H): ICD-10-CM

## 2020-10-12 ENCOUNTER — PATIENT OUTREACH (OUTPATIENT)
Dept: GASTROENTEROLOGY | Facility: CLINIC | Age: 75
End: 2020-10-12

## 2020-10-12 DIAGNOSIS — C45.0 MESOTHELIOMA (PLEURAL) (H): Primary | ICD-10-CM

## 2020-10-12 NOTE — PROGRESS NOTES
Care Coordination Telephone Call  [facility]     Returned call to Nataliia to discuss plan with her.      She will contact the GI doctor in [place] for follow up post hospital stay.     They have also requested a virtual visit with Dr. Sepulveda prior to the procedure next week and we will arrange that.    We discussed preoperative instructions or physical and they will contact the covid testing line to arrange covid test for week of procedure.  I have provided contact information.     He is currently off of TPN and now getting tube feedings.     I have asked the patient to call with any additional questions or concerns and have provided my contact information.    Plan:  Virtual visit next week with Dr. Derick GASCAN, HNBC, STAR-T  RN Care Coordinator  [facility]  Ph: [phone]  FAX: [phone]

## 2020-10-13 RX ORDER — ALBUTEROL SULFATE 90 UG/1
2 AEROSOL, METERED RESPIRATORY (INHALATION) EVERY 6 HOURS PRN
Qty: 18 G | Refills: 0 | Status: SHIPPED | OUTPATIENT
Start: 2020-10-13

## 2020-10-13 NOTE — PROGRESS NOTES
This is a recent snapshot of the patient's [facility] medical record.  For current drug dose and complete information and questions, call [phone] or In Basket pool, fv home infusion ([id number])  CSN Number:  [id number]

## 2020-10-13 NOTE — PROGRESS NOTES
"Pradeep Figueroa Jr. is a 75 year old male who is being evaluated via a billable video visit.      The patient has been notified of following:     \"This video visit will be conducted via a call between you and your physician/provider. We have found that certain health care needs can be provided without the need for an in-person physical exam.  This service lets us provide the care you need with a video conversation.  If a prescription is necessary we can send it directly to your pharmacy.  If lab work is needed we can place an order for that and you can then stop by our lab to have the test done at a later time.    Video visits are billed at different rates depending on your insurance coverage.  Please reach out to your insurance provider with any questions.    If during the course of the call the physician/provider feels a video visit is not appropriate, you will not be charged for this service.\"    Patient has given verbal consent for Video visit? Yes    How would you like to obtain your AVS? MyChart     If you are dropped from the video visit, the video invite should be resent to: Text to cell phone: [phone]     Will anyone else be joining your video visit? No         I have reviewed and updated the patient's allergies and pt confirmed any changes to their medication list.  Patient was asked to provide any patient recorded vital signs, height and/or weight.  Please see \"Patient Reported Vital Signs\" tab for that information.  Patient instructed to be in the virtual waiting room 10-15 minutes prior to appointment time.    There were no vitals taken for this visit.      Concerns: Patient has no new concerns.      Refills: None        ROSALINE Brian          Video-Visit Details    Type of service:  Video Visit    Video Start Time: 8:45  Video End Time: 9:17 AM    Originating Location (pt. Location): Home    Distant Location (provider location):  [facility]     Platform used for " "Video Visit: Archie Breen MD                Phone visit consent insert: yes    TELEPHONE VISIT       CHIEF COMPLAINT:  Pleural mesothelioma.      HISTORY OF PRESENT ILLNESS:      The patient is a 75-year-old gentleman with a history of pleural mesothelioma.  He had presented in 07/2019 with progressive dysphagia.  Evaluation, including a CT of the chest, abdomen and pelvis in 02/2020 showed a circumferential distal esophageal thickening with upstream dilation.  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        The patient started treatment with carboplatin, pemetrexed and bevacizumab in 03/2020.  In 06/2020, we switched to ipilimumab and nivolumab.  The patient has been on this treatment now for approximately 6 weeks.  CT scan shows stable disease with decreased in his left pleural effusion.  There is no right-sided pleural effusion.        The patient was admitted to  hospital over two weeks ago with uncontrolled diarrhea.  I disucussed with Dr. Beasley, the  hospitalist, trying Remicade and this alleviated the patient's diarrhea.    Today he tells me he feels quite well.  He had a normal BM today.  He is not on anti-diarrheals since the Remicade therapy last week.    I disucssed with him that we will likely hold any further immunotherapy.  Other choices would include study drugs, gemcitabine, or navelbine.  We will make this plan on November 9 when I see him back with a restaging CT scan.     He will see Dr. Sepulveda next week for a plan EGD and possible stenting.     REVIEW OF SYSTEMS:  A 10 point review of systems is relatively noncontributory.  He still has some difficulty clearing his secretions, but that has improved.  A 10 point review of systems was carried out.   Feels \"great\".     LABORATORY:  Comprehensive metabolic panel and CBC were essentially normal.  Albumin was slightly low, but stable at 2.9. CBC RESULTS:   Recent Labs   Lab Test 09/16/20  0728   WBC 10.1 "   RBC 4.57   HGB 13.1*   HCT 41.0   MCV 90   MCH 28.7   MCHC 32.0   RDW 16.1*        Last Comprehensive Metabolic Panel:  Sodium   Date Value Ref Range Status   09/16/2020 136 133 - 144 mmol/L Final     Potassium   Date Value Ref Range Status   09/16/2020 4.4 3.4 - 5.3 mmol/L Final     Chloride   Date Value Ref Range Status   09/16/2020 105 94 - 109 mmol/L Final     Carbon Dioxide   Date Value Ref Range Status   09/16/2020 27 20 - 32 mmol/L Final     Anion Gap   Date Value Ref Range Status   09/16/2020 4 3 - 14 mmol/L Final     Glucose   Date Value Ref Range Status   09/16/2020 141 (H) 70 - 99 mg/dL Final     Urea Nitrogen   Date Value Ref Range Status   09/16/2020 17 7 - 30 mg/dL Final     Creatinine   Date Value Ref Range Status   09/16/2020 0.64 (L) 0.66 - 1.25 mg/dL Final     GFR Estimate   Date Value Ref Range Status   09/16/2020 >90 >60 mL/min/[1.73_m2] Final     Comment:     Non  GFR Calc  Starting 12/18/2018, serum creatinine based estimated GFR (eGFR) will be   calculated using the Chronic Kidney Disease Epidemiology Collaboration   (CKD-EPI) equation.       Calcium   Date Value Ref Range Status   09/16/2020 8.7 8.5 - 10.1 mg/dL Final     Bilirubin Total   Date Value Ref Range Status   09/16/2020 0.2 0.2 - 1.3 mg/dL Final     Alkaline Phosphatase   Date Value Ref Range Status   09/16/2020 45 40 - 150 U/L Final     ALT   Date Value Ref Range Status   09/16/2020 31 0 - 70 U/L Final     AST   Date Value Ref Range Status   09/16/2020 14 0 - 45 U/L Final        IMAGING:  No imaging was carried out today.   Recent Results (from the past 744 hour(s))   CT Chest/Abdomen/Pelvis w Contrast    Narrative    EXAMINATION: CT CHEST/ABDOMEN/PELVIS W CONTRAST, 8/17/2020 2:13 PM    TECHNIQUE: Helical CT images from the thoracic inlet through the  symphysis pubis were obtained with intravenous contrast. Contrast  dose: 78 ml isovue 370    COMPARISON: CT 6/16/2020 and PET/CT dated 3/10/20,    HISTORY:  Mesothelioma (pleural) (H).    FINDINGS:    Lower neck: Left internal jugular lymph node measuring 0.7 cm(3/32).    Chest:   There is a large enhancing soft tissue density mass measuring 1.8 x 3  x 8.1 cm (AP, T, CC) along the distal esophagus located between the  left atrium and descending thoracic aorta causing compression of the  esophagus and proximal dilatation. Inferiorly the mass extends to the  level of the gastroesophageal junction and extends to bilateral  diaphragmatic crux and left parietal pleura (series 3, image 264).  There is moderate pleural effusion, decreased compared to prior study.    Redemonstrated enhancing pleural-based mass in the posterior left  upper lobe along the left fifth rib with extension to the T4-5 neural  foramen (3/67).  Lungs: No suspicious nodules, focal consolidation or mass. No pleural  effusion or pneumothorax.  Mediastinum: No lymphadenopathy by size criteria.  Heart and great vessels: Heart is normal in size and there is no  pericardial effusion. Aorta and main pulmonary artery are within  normal limits in caliber.    Abdomen and pelvis:  Liver: Stable hepatic cysts. No enhancing masses.  Biliary/Gallbladder: No CT evidence of calculi, wall thickening or  pericholecystic fluid. No intra or extrahepatic biliary dilatation.  Spleen: Normal size. No focal lesions.  Pancreas: No evidence of pancreatic mass or peripancreatic fluid.  Adrenals: Normal.  Kidneys: No hydronephrosis, calculi or mass.  Urinary bladder: Unremarkable.  Reproductive organs: Mild prostatomegaly.  Gastrointestinal: The stomach duodenum are unremarkable. Small bowel  is normal in caliber. Mild bowel wall thickening noted in the sigmoid  colon. The appendix is normal.  Mesentery/Peritoneum: No territorial nodules, ascites or  pneumoperitoneum.  Lymph nodes: No lymphadenopathy.  Vasculature: Mild atherosclerotic calcifications. IVC is normal.   Other: None.     Bones and soft tissues: Visualized bony  structures are consistent with  the patient's age.      Impression    IMPRESSION:  1.  Stable mass along the distal esophagus, extending to the bilateral  diaphragmatic crux and left pleura, consistent with known malignant  mesothelioma, with upstream esophageal dilatation. Interval decreased  left pleural effusion.  2.  Stable pleural based mass along the left fifth rib extending to  the left T4-5 neural foramen.  3.  No evidence of metastasis in the abdomen or pelvis.    BRIEN WOOD MD   CT Chest w contrast    Narrative    EXAMINATION: CT CHEST W CONTRAST, 9/14/2020 3:24 PM    CLINICAL HISTORY: Mesothelioma, assess treatment response;  Mesothelioma (pleural) (H)    COMPARISON: 8/17/2020, 6/16/2020, CT CAP 4/21/2020.    TECHNIQUE: CT imaging obtained through the chest without contrast.  Coronal and axial MIP reformatted images obtained.     CONTRAST:  none.    FINDINGS:    Right chest wall Port-A-Cath tip at the right atrium. Normal heart  size, no pericardial effusion. Normal caliber thoracic aorta and  pulmonary artery. Normal thyroid. No suspicious lower cervical,  axillary, or mediastinal lymphadenopathy. Calcified left hilar lymph  nodes. Again noted is the large esophageal mass which measures  approximately 9 cm in length by 1.8 cm in width and results in marked  upstream esophageal dilatation. The mass appears to involve the left  diaphragmatic proximal and left pleural without significant change  compared to 8/17/2020.     Central tracheobronchial tree is widely patent. Right Bochdalek  hernia. Left pleural effusion with associated compressive atelectasis.  No pneumothorax. No suspicious pulmonary mass or nodule. Calcified  left pleural plaque. The pleural-based mass along the left fifth rib  seen on prior CT CAP 8/17/2020 is not well visualized on this study.  Common origin of the right brachiocephalic artery and left common  carotid artery from the aortic arch.    Evaluation of the upper abdomen is  limited. Multiple stable hepatic  cysts. Incompletely visualized bilateral mild-moderate peripelvic  cysts, left greater than right.    BONES: Osteopenic appearance of the bones. No acute or suspicious  osseous abnormality.      Impression    IMPRESSION:  1. Essentially unchanged size and appearance of the known mesothelioma  of the distal esophagus with extension along the left diaphragmatic  crux and left pleura.  2. Markedly dilated and fluid filled upstream esophagus.  3. The pleural-based mass along the left fifth rib seen on prior CT  CAP 8/17/2020 is not well seen on this study, essentially secondary to  slice thickness.    I have personally reviewed the examination and initial interpretation  and I agree with the findings.    DALLAS VARGAS MD   CT Soft tissue neck w contrast*    Narrative    CT SOFT TISSUE NECK W CONTRAST 9/14/2020 3:24 PM    History:  to look at throat/ upper esophagus for stricture.;  Mesothelioma (pleural) (H)  ICD-10: Mesothelioma (pleural) (H)      Comparison:  None     Technique: Following intravenous administration of nonionic iodinated  contrast medium, thin section helical CT images were obtained from the  skull base down to the level of the aortic arch.  Axial, coronal and  sagittal reformations were performed with 2-3 mm slice thickness  reconstruction. Images were reviewed in soft tissue, lung and bone  windows.    Contrast: 100 ml isovue 370    Findings: Suboptimal evaluation secondary to motion artifact.  Evaluation of the mucosal space demonstrates no evident abnormality in  the nasopharynx, oropharynx, hypopharynx or the glottis. The tongue  base appears normal. The major salivary glands appear unremarkable.  The thyroid gland appears normal.    There is no evident cervical lymphadenopathy. The fascial spaces in  the neck are intact bilaterally. The major vascular structures in the  neck appear unremarkable.    Evaluation of the osseous structures demonstrate no  worrisome lytic or  sclerotic lesion. Moderate bilaterally neural foraminal stenosis C5-6  and mild left neural foraminal stenosis at C6-7. The visualized  paranasal sinuses are clear. The mastoid air cells are clear.     The visualized lung apices are clear. Markedly distended and  fluid-filled esophagus, better characterized on same-day chest CT.       Impression    Impression:  1. The proximal cervical esophagus is effaced, which could be normal.  For optimal evaluation of stricture, consider endoscopy or esophagram.  2. Markedly distended and fluid-filled esophagus, better characterized  on same-day chest CT.    I have personally reviewed the examination and initial interpretation  and I agree with the findings.    TONIA BARAHONA MD        PHYSICAL EXAMINATION:  This was a video visit, and no exam was carried out.  The patient's voice is clear, and he is oriented x3.      ASSESSMENT AND PLAN:  Pleural mesothelioma:  The patient has pleural mesothelioma with esophageal infiltration.  His esophagus is completely closed.  They were initially unable to pass a stent.  We could consider stenting now if he is clearing some of his secretions.  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    Patient will RTC on 11/9 with a CT scan.       cc:   Rajni Drummond, APRN, CNP   [facility]    [id number] [place] [id number]      Moe Martínez MD   [facility]    [address] [place] [id number]

## 2020-10-14 ENCOUNTER — VIRTUAL VISIT (OUTPATIENT)
Dept: ONCOLOGY | Facility: CLINIC | Age: 75
End: 2020-10-14
Attending: INTERNAL MEDICINE
Payer: MEDICARE

## 2020-10-14 DIAGNOSIS — C45.0 MESOTHELIOMA (PLEURAL) (H): Primary | ICD-10-CM

## 2020-10-14 PROCEDURE — 999N000103 HC STATISTIC NO CHARGE FACILITY FEE

## 2020-10-14 PROCEDURE — 99214 OFFICE O/P EST MOD 30 MIN: CPT | Mod: 95 | Performed by: INTERNAL MEDICINE

## 2020-10-14 NOTE — LETTER
"    10/14/2020         RE: Pradeep Figueroa Jr.  [address] [place] [address]        Dear Colleague,    Thank you for referring your patient, Pradeep Figueroa Jr., to the [facility]. Please see a copy of my visit note below.    Pradeep Figueroa Jr. is a 75 year old male who is being evaluated via a billable video visit.      The patient has been notified of following:     \"This video visit will be conducted via a call between you and your physician/provider. We have found that certain health care needs can be provided without the need for an in-person physical exam.  This service lets us provide the care you need with a video conversation.  If a prescription is necessary we can send it directly to your pharmacy.  If lab work is needed we can place an order for that and you can then stop by our lab to have the test done at a later time.    Video visits are billed at different rates depending on your insurance coverage.  Please reach out to your insurance provider with any questions.    If during the course of the call the physician/provider feels a video visit is not appropriate, you will not be charged for this service.\"    Patient has given verbal consent for Video visit? Yes    How would you like to obtain your AVS? MyChart     If you are dropped from the video visit, the video invite should be resent to: Text to cell phone: [phone]     Will anyone else be joining your video visit? No         I have reviewed and updated the patient's allergies and pt confirmed any changes to their medication list.  Patient was asked to provide any patient recorded vital signs, height and/or weight.  Please see \"Patient Reported Vital Signs\" tab for that information.  Patient instructed to be in the virtual waiting room 10-15 minutes prior to appointment time.    There were no vitals taken for this visit.      Concerns: Patient has no new concerns.      Refills: None        Keshawn " ROSALINE Muir          Video-Visit Details    Type of service:  Video Visit    Video Start Time: 8:45  Video End Time: 9:17 AM    Originating Location (pt. Location): Home    Distant Location (provider location):  [facility] CAN[facility]ER [facility]     Platform used for Video Visit: Archie Breen MD                Phone visit consent insert: yes    TELEPHONE VISIT       CHIEF COMPLAINT:  Pleural mesothelioma.      HISTORY OF PRESENT ILLNESS:      The patient is a 75-year-old gentleman with a history of pleural mesothelioma.  He had presented in 07/2019 with progressive dysphagia.  Evaluation, including a CT of the chest, abdomen and pelvis in 02/2020 showed a circumferential distal esophageal thickening with upstream dilation.  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        The patient started treatment with carboplatin, pemetrexed and bevacizumab in 03/2020.  In 06/2020, we switched to ipilimumab and nivolumab.  The patient has been on this treatment now for approximately 6 weeks.  CT scan shows stable disease with decreased in his left pleural effusion.  There is no right-sided pleural effusion.        The patient was admitted to  hospital over two weeks ago with uncontrolled diarrhea.  I disucussed with Dr. Beasley, the  hospitalist, trying Remicade and this alleviated the patient's diarrhea.    Today he tells me he feels quite well.  He had a normal BM today.  He is not on anti-diarrheals since the Remicade therapy last week.    I disucssed with him that we will likely hold any further immunotherapy.  Other choices would include study drugs, gemcitabine, or navelbine.  We will make this plan on November 9 when I see him back with a restaging CT scan.     He will see Dr. Sepulveda next week for a plan EGD and possible stenting.     REVIEW OF SYSTEMS:  A 10 point review of systems is relatively noncontributory.  He still has some difficulty clearing his secretions, but  "that has improved.  A 10 point review of systems was carried out.   Feels \"great\".     LABORATORY:  Comprehensive metabolic panel and CBC were essentially normal.  Albumin was slightly low, but stable at 2.9. CBC RESULTS:   Recent Labs   Lab Test 09/16/20  0728   WBC 10.1   RBC 4.57   HGB 13.1*   HCT 41.0   MCV 90   MCH 28.7   MCHC 32.0   RDW 16.1*        Last Comprehensive Metabolic Panel:  Sodium   Date Value Ref Range Status   09/16/2020 136 133 - 144 mmol/L Final     Potassium   Date Value Ref Range Status   09/16/2020 4.4 3.4 - 5.3 mmol/L Final     Chloride   Date Value Ref Range Status   09/16/2020 105 94 - 109 mmol/L Final     Carbon Dioxide   Date Value Ref Range Status   09/16/2020 27 20 - 32 mmol/L Final     Anion Gap   Date Value Ref Range Status   09/16/2020 4 3 - 14 mmol/L Final     Glucose   Date Value Ref Range Status   09/16/2020 141 (H) 70 - 99 mg/dL Final     Urea Nitrogen   Date Value Ref Range Status   09/16/2020 17 7 - 30 mg/dL Final     Creatinine   Date Value Ref Range Status   09/16/2020 0.64 (L) 0.66 - 1.25 mg/dL Final     GFR Estimate   Date Value Ref Range Status   09/16/2020 >90 >60 mL/min/[1.73_m2] Final     Comment:     Non  GFR Calc  Starting 12/18/2018, serum creatinine based estimated GFR (eGFR) will be   calculated using the Chronic Kidney Disease Epidemiology Collaboration   (CKD-EPI) equation.       Calcium   Date Value Ref Range Status   09/16/2020 8.7 8.5 - 10.1 mg/dL Final     Bilirubin Total   Date Value Ref Range Status   09/16/2020 0.2 0.2 - 1.3 mg/dL Final     Alkaline Phosphatase   Date Value Ref Range Status   09/16/2020 45 40 - 150 U/L Final     ALT   Date Value Ref Range Status   09/16/2020 31 0 - 70 U/L Final     AST   Date Value Ref Range Status   09/16/2020 14 0 - 45 U/L Final        IMAGING:  No imaging was carried out today.   Recent Results (from the past 744 hour(s))   CT Chest/Abdomen/Pelvis w Contrast    Narrative    EXAMINATION: CT " CHEST/ABDOMEN/PELVIS W CONTRAST, 8/17/2020 2:13 PM    TECHNIQUE: Helical CT images from the thoracic inlet through the  symphysis pubis were obtained with intravenous contrast. Contrast  dose: 78 ml isovue 370    COMPARISON: CT 6/16/2020 and PET/CT dated 3/10/20,    HISTORY: Mesothelioma (pleural) (H).    FINDINGS:    Lower neck: Left internal jugular lymph node measuring 0.7 cm(3/32).    Chest:   There is a large enhancing soft tissue density mass measuring 1.8 x 3  x 8.1 cm (AP, T, CC) along the distal esophagus located between the  left atrium and descending thoracic aorta causing compression of the  esophagus and proximal dilatation. Inferiorly the mass extends to the  level of the gastroesophageal junction and extends to bilateral  diaphragmatic crux and left parietal pleura (series 3, image 264).  There is moderate pleural effusion, decreased compared to prior study.    Redemonstrated enhancing pleural-based mass in the posterior left  upper lobe along the left fifth rib with extension to the T4-5 neural  foramen (3/67).  Lungs: No suspicious nodules, focal consolidation or mass. No pleural  effusion or pneumothorax.  Mediastinum: No lymphadenopathy by size criteria.  Heart and great vessels: Heart is normal in size and there is no  pericardial effusion. Aorta and main pulmonary artery are within  normal limits in caliber.    Abdomen and pelvis:  Liver: Stable hepatic cysts. No enhancing masses.  Biliary/Gallbladder: No CT evidence of calculi, wall thickening or  pericholecystic fluid. No intra or extrahepatic biliary dilatation.  Spleen: Normal size. No focal lesions.  Pancreas: No evidence of pancreatic mass or peripancreatic fluid.  Adrenals: Normal.  Kidneys: No hydronephrosis, calculi or mass.  Urinary bladder: Unremarkable.  Reproductive organs: Mild prostatomegaly.  Gastrointestinal: The stomach duodenum are unremarkable. Small bowel  is normal in caliber. Mild bowel wall thickening noted in the  sigmoid  colon. The appendix is normal.  Mesentery/Peritoneum: No territorial nodules, ascites or  pneumoperitoneum.  Lymph nodes: No lymphadenopathy.  Vasculature: Mild atherosclerotic calcifications. IVC is normal.   Other: None.     Bones and soft tissues: Visualized bony structures are consistent with  the patient's age.      Impression    IMPRESSION:  1.  Stable mass along the distal esophagus, extending to the bilateral  diaphragmatic crux and left pleura, consistent with known malignant  mesothelioma, with upstream esophageal dilatation. Interval decreased  left pleural effusion.  2.  Stable pleural based mass along the left fifth rib extending to  the left T4-5 neural foramen.  3.  No evidence of metastasis in the abdomen or pelvis.    BRIEN WOOD MD   CT Chest w contrast    Narrative    EXAMINATION: CT CHEST W CONTRAST, 9/14/2020 3:24 PM    CLINICAL HISTORY: Mesothelioma, assess treatment response;  Mesothelioma (pleural) (H)    COMPARISON: 8/17/2020, 6/16/2020, CT CAP 4/21/2020.    TECHNIQUE: CT imaging obtained through the chest without contrast.  Coronal and axial MIP reformatted images obtained.     CONTRAST:  none.    FINDINGS:    Right chest wall Port-A-Cath tip at the right atrium. Normal heart  size, no pericardial effusion. Normal caliber thoracic aorta and  pulmonary artery. Normal thyroid. No suspicious lower cervical,  axillary, or mediastinal lymphadenopathy. Calcified left hilar lymph  nodes. Again noted is the large esophageal mass which measures  approximately 9 cm in length by 1.8 cm in width and results in marked  upstream esophageal dilatation. The mass appears to involve the left  diaphragmatic proximal and left pleural without significant change  compared to 8/17/2020.     Central tracheobronchial tree is widely patent. Right Bochdalek  hernia. Left pleural effusion with associated compressive atelectasis.  No pneumothorax. No suspicious pulmonary mass or nodule. Calcified  left  pleural plaque. The pleural-based mass along the left fifth rib  seen on prior CT CAP 8/17/2020 is not well visualized on this study.  Common origin of the right brachiocephalic artery and left common  carotid artery from the aortic arch.    Evaluation of the upper abdomen is limited. Multiple stable hepatic  cysts. Incompletely visualized bilateral mild-moderate peripelvic  cysts, left greater than right.    BONES: Osteopenic appearance of the bones. No acute or suspicious  osseous abnormality.      Impression    IMPRESSION:  1. Essentially unchanged size and appearance of the known mesothelioma  of the distal esophagus with extension along the left diaphragmatic  crux and left pleura.  2. Markedly dilated and fluid filled upstream esophagus.  3. The pleural-based mass along the left fifth rib seen on prior CT  CAP 8/17/2020 is not well seen on this study, essentially secondary to  slice thickness.    I have personally reviewed the examination and initial interpretation  and I agree with the findings.    DALLAS VARGAS MD   CT Soft tissue neck w contrast*    Narrative    CT SOFT TISSUE NECK W CONTRAST 9/14/2020 3:24 PM    History:  to look at throat/ upper esophagus for stricture.;  Mesothelioma (pleural) (H)  ICD-10: Mesothelioma (pleural) (H)      Comparison:  None     Technique: Following intravenous administration of nonionic iodinated  contrast medium, thin section helical CT images were obtained from the  skull base down to the level of the aortic arch.  Axial, coronal and  sagittal reformations were performed with 2-3 mm slice thickness  reconstruction. Images were reviewed in soft tissue, lung and bone  windows.    Contrast: 100 ml isovue 370    Findings: Suboptimal evaluation secondary to motion artifact.  Evaluation of the mucosal space demonstrates no evident abnormality in  the nasopharynx, oropharynx, hypopharynx or the glottis. The tongue  base appears normal. The major salivary glands appear  unremarkable.  The thyroid gland appears normal.    There is no evident cervical lymphadenopathy. The fascial spaces in  the neck are intact bilaterally. The major vascular structures in the  neck appear unremarkable.    Evaluation of the osseous structures demonstrate no worrisome lytic or  sclerotic lesion. Moderate bilaterally neural foraminal stenosis C5-6  and mild left neural foraminal stenosis at C6-7. The visualized  paranasal sinuses are clear. The mastoid air cells are clear.     The visualized lung apices are clear. Markedly distended and  fluid-filled esophagus, better characterized on same-day chest CT.       Impression    Impression:  1. The proximal cervical esophagus is effaced, which could be normal.  For optimal evaluation of stricture, consider endoscopy or esophagram.  2. Markedly distended and fluid-filled esophagus, better characterized  on same-day chest CT.    I have personally reviewed the examination and initial interpretation  and I agree with the findings.    TONIA BARAHONA MD        PHYSICAL EXAMINATION:  This was a video visit, and no exam was carried out.  The patient's voice is clear, and he is oriented x3.      ASSESSMENT AND PLAN:  Pleural mesothelioma:  The patient has pleural mesothelioma with esophageal infiltration.  His esophagus is completely closed.  They were initially unable to pass a stent.  We could consider stenting now if he is clearing some of his secretions.  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    Patient will RTC on 11/9 with a CT scan.       cc:   TOY Mendiola, CNP   [facility]    [id number] [place] [id number]      Moe Martínez MD   [facility] Radiation/Oncology    [address] [place] [address]               Again, thank you for allowing me to participate in the care of your patient.        Sincerely,        Myles Breen MD

## 2020-10-21 ENCOUNTER — VIRTUAL VISIT (OUTPATIENT)
Dept: GASTROENTEROLOGY | Facility: CLINIC | Age: 75
End: 2020-10-21
Attending: INTERNAL MEDICINE
Payer: MEDICARE

## 2020-10-21 ENCOUNTER — PATIENT OUTREACH (OUTPATIENT)
Dept: GASTROENTEROLOGY | Facility: CLINIC | Age: 75
End: 2020-10-21

## 2020-10-21 DIAGNOSIS — K22.2 STRICTURE OF ESOPHAGUS: Primary | ICD-10-CM

## 2020-10-21 PROCEDURE — 99214 OFFICE O/P EST MOD 30 MIN: CPT | Mod: 95 | Performed by: INTERNAL MEDICINE

## 2020-10-21 NOTE — LETTER
"10/21/2020       RE: Pradeep Figueroa Jr.  [address] [place] [address]     Dear Colleague,    Thank you for referring your patient, Pradeep Figueroa Jr., to the [facility] at [facility]. Please see a copy of my visit note below.    I have reviewed and updated the patient's allergies and pt confirmed any changes to their medication list.  Patient was asked to provide any patient recorded vital signs, height and/or weight.  Please see \"Patient Reported Vital Signs\" tab for that information.  Patient instructed to be in the virtual waiting room 10-15 minutes prior to appointment time.    There were no vitals taken for this visit.    Concerns: none    Refills: Folic Acid    ROSALINE Brian    INTERVENTIONAL ENDOSCOPY OUTPATIENT CLINIC FOLLOW-UP  DATE OF SERVICE: 10/21/2020  PHYSICIAN REQUESTING CONSULT: Moe Martínez MD  Reason for Consultation: extrinsic esophageal stricture; mesothelioma    ASSESSMENT:  Yossi is a 75 year old male with unresectable mesothelioma causing extrinsic compression of the esophagus initially misdiagnosed as achalasia s/p PEG tube in March 2020 essentially on palliative treatment who was referred for palliation of his esophageal stricture. I explained that his stricture is atypical of other esophageal cancer strictures in that it is extrinsic. For that reason esophageal stent placement has a higher rate of migration. I explained that we can mitigate that migration risk by suturing the stent in place to the esophageal wall although the risk is still there. I explained what would happen if the stent did migrate and that we would have to do an endoscopy to retrieve the stent. I also am concerned with how narrow his stricture is and how well he will tolerate a fully sized esophageal stent. I explained that we may have to try a small caliber stent (potentially even a biliary stent) to start the process and over " time upsize the stent which would mean it may be awhile until he can have a regular diet. I also discussed behavioral modification he will need to make due to the reflux issues: head of bed elevation at least 30-45 degrees, no laying down 3 hours after eating. He will have to be on BID PPI.     He and his wife are amenable to proceeding.    RECOMMENDATIONS:  - Proceed with EGD with stent placement and endoscopic suturing    Salazar Sepulveda MD  [facility]  Division of Gastroenterology and Hepatology  [facility] [address] [phone]  ________________________________________________________________  HPI:  Yossi is a 75 year old male with mesothelioma causing extrinsic compression of the esophagus initially misdiagnosed as achalasia. The mesothelioma was unresectable and he has received chemotherapy. PEG tube was placed in March 2020. At this point his oncologic treatment is palliative. He has limited PO intake and has difficulty handling his oral secretions. His tube feeds run nearly continuously as higher rates he doesn't tolerate. 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    PMHx:  Past Medical History:   Diagnosis Date     History of immunotherapy     Ipilimumab/Nivolumab     Mesothelioma (H) 02/20/2020     Personal history of chemotherapy     Pemetrexed/Carboplatin/Bevacizumab     Seizures (H)     Patient reports most recent seizure in 2013     PSurgHx:  Past Surgical History:   Procedure Laterality Date     ENDOSCOPIC ULTRASOUND UPPER GASTROINTESTINAL TRACT (GI) N/A 2/20/2020    Procedure: ENDOSCOPIC ULTRASOUND WITH FINE NEEDLE ASPIRATION, ESOPHAGOSCOPY / UPPER GASTROINTESTINAL TRACT (GI) WITH BIOPSY  BIOPSY;  Surgeon: Salazar Sepulveda MD;  Location: [facility] OR     INSERT PORT VASCULAR ACCESS Right 3/17/2020    Procedure: INSERTION, VASCULAR ACCESS PORT RIGHT INTERNAL JUGULAR;  Surgeon: Sandeep Ramirez DO;   Location: [facility] OR     TONSILLECTOMY       MEDS:  Current Outpatient Medications   Medication     acetaminophen (TYLENOL) 325 MG tablet     albuterol (PROAIR HFA/PROVENTIL HFA/VENTOLIN HFA) 108 (90 Base) MCG/ACT inhaler     cholecalciferol 25 MCG (1000 UT) TABS     diphenoxylate-atropine (LOMOTIL) 2.5-0.025 MG tablet     folic acid (FOLVITE) 1 MG tablet     Loperamide HCl (IMODIUM A-D PO)     LORazepam (ATIVAN) 0.5 MG tablet     Nutritional Supplements (ISOSOURCE 1.5 DIMA) LIQD     OLANZapine (ZYPREXA) 2.5 MG tablet     OLANZapine (ZYPREXA) 2.5 MG tablet     omeprazole (PRILOSEC) 2 mg/mL suspension     ondansetron (ZOFRAN-ODT) 8 MG ODT tab     opium tincture 10 MG/ML (1%) liquid     oxyCODONE (ROXICODONE) 5 MG tablet     prochlorperazine (COMPAZINE) 10 MG tablet     lamoTRIgine (LAMICTAL) 200 MG tablet     Current Facility-Administered Medications   Medication     heparin 100 UNIT/ML injection 5 mL     Facility-Administered Medications Ordered in Other Visits   Medication     heparin 100 UNIT/ML injection 5 mL     ALLERGIES:  No Known Allergies    Physical Exam  Gen: A&Ox3, NAD, cachectic   HEENT: Moist mucus membranes, no scleral icterus.  Lungs: no respiratory distress    LABS:  Infusion Therapy Visit on 09/16/2020   Component Date Value Ref Range Status     Sodium 09/16/2020 136  133 - 144 mmol/L Final     Potassium 09/16/2020 4.4  3.4 - 5.3 mmol/L Final     Chloride 09/16/2020 105  94 - 109 mmol/L Final     Carbon Dioxide 09/16/2020 27  20 - 32 mmol/L Final     Anion Gap 09/16/2020 4  3 - 14 mmol/L Final     Glucose 09/16/2020 141* 70 - 99 mg/dL Final     Urea Nitrogen 09/16/2020 17  7 - 30 mg/dL Final     Creatinine 09/16/2020 0.64* 0.66 - 1.25 mg/dL Final     GFR Estimate 09/16/2020 >90  >60 mL/min/[1.73_m2] Final    Comment: Non  GFR Calc  Starting 12/18/2018, serum creatinine based estimated GFR (eGFR) will be   calculated using the Chronic Kidney Disease Epidemiology Collaboration   (CKD-EPI)  equation.       GFR Estimate If Black 09/16/2020 >90  >60 mL/min/[1.73_m2] Final    Comment:  GFR Calc  Starting 12/18/2018, serum creatinine based estimated GFR (eGFR) will be   calculated using the Chronic Kidney Disease Epidemiology Collaboration   (CKD-EPI) equation.       Calcium 09/16/2020 8.7  8.5 - 10.1 mg/dL Final     Bilirubin Total 09/16/2020 0.2  0.2 - 1.3 mg/dL Final     Albumin 09/16/2020 2.9* 3.4 - 5.0 g/dL Final     Protein Total 09/16/2020 6.7* 6.8 - 8.8 g/dL Final     Alkaline Phosphatase 09/16/2020 45  40 - 150 U/L Final     ALT 09/16/2020 31  0 - 70 U/L Final     AST 09/16/2020 14  0 - 45 U/L Final     WBC 09/16/2020 10.1  4.0 - 11.0 10e9/L Final     RBC Count 09/16/2020 4.57  4.4 - 5.9 10e12/L Final     Hemoglobin 09/16/2020 13.1* 13.3 - 17.7 g/dL Final     Hematocrit 09/16/2020 41.0  40.0 - 53.0 % Final     MCV 09/16/2020 90  78 - 100 fl Final     MCH 09/16/2020 28.7  26.5 - 33.0 pg Final     MCHC 09/16/2020 32.0  31.5 - 36.5 g/dL Final     RDW 09/16/2020 16.1* 10.0 - 15.0 % Final     Platelet Count 09/16/2020 283  150 - 450 10e9/L Final     Diff Method 09/16/2020 Automated Method   Final     % Neutrophils 09/16/2020 78.8  % Final     % Lymphocytes 09/16/2020 9.9  % Final     % Monocytes 09/16/2020 10.3  % Final     % Eosinophils 09/16/2020 0.0  % Final     % Basophils 09/16/2020 0.5  % Final     % Immature Granulocytes 09/16/2020 0.5  % Final     Nucleated RBCs 09/16/2020 0  0 /100 Final     Absolute Neutrophil 09/16/2020 7.9  1.6 - 8.3 10e9/L Final     Absolute Lymphocytes 09/16/2020 1.0  0.8 - 5.3 10e9/L Final     Absolute Monocytes 09/16/2020 1.0  0.0 - 1.3 10e9/L Final     Absolute Eosinophils 09/16/2020 0.0  0.0 - 0.7 10e9/L Final     Absolute Basophils 09/16/2020 0.1  0.0 - 0.2 10e9/L Final     Abs Immature Granulocytes 09/16/2020 0.1  0 - 0.4 10e9/L Final     Absolute Nucleated RBC 09/16/2020 0.0   Final       Again, thank you for allowing me to participate in the care  of your patient.  Sincerely,    Salazar Sepulveda MD

## 2020-10-21 NOTE — LETTER
"    10/21/2020         RE: Pradeep Figueroa Jr.  [address] [place] [address]        Dear Colleague,    Thank you for referring your patient, Pradeep Figueroa Jr., to the [facility]. Please see a copy of my visit note below.    Pradeep Figueroa Jr. is a 75 year old male who is being evaluated via a billable video visit.      The patient has been notified of following:     \"This video visit will be conducted via a call between you and your physician/provider. We have found that certain health care needs can be provided without the need for an in-person physical exam.  This service lets us provide the care you need with a video conversation.  If a prescription is necessary we can send it directly to your pharmacy.  If lab work is needed we can place an order for that and you can then stop by our lab to have the test done at a later time.    Video visits are billed at different rates depending on your insurance coverage.  Please reach out to your insurance provider with any questions.    If during the course of the call the physician/provider feels a video visit is not appropriate, you will not be charged for this service.\"    Patient has given verbal consent for Video visit? Yes    How would you like to obtain your AVS? MyChart     If you are dropped from the video visit, the video invite should be resent to: Text to cell phone: [phone]     Wife's Cell: [phone] (just in case you have trouble)    Will anyone else be joining your video visit? No         I have reviewed and updated the patient's allergies and pt confirmed any changes to their medication list.  Patient was asked to provide any patient recorded vital signs, height and/or weight.  Please see \"Patient Reported Vital Signs\" tab for that information.  Patient instructed to be in the virtual waiting room 10-15 minutes prior to appointment time.    There were no vitals taken for this visit.      Concerns: " none      Refills: Folic Acid        Keshawn Muir, ROSALINE        Video-Visit Details    Type of service:  Video Visit    Video Start Time: 7:00 AM  Video End Time: 7:43 AM    Originating Location (pt. 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 I explained that his stricture is atypical of other esophageal cancer strictures in that it is extrinsic. For that reason esophageal stent placement has a higher rate of migration. I explained that we can mitigate that migration risk by suturing the stent in place to the esophageal wall although the risk is still there. I explained what would happen if the stent did migrate and that we would have to do an endoscopy to retrieve the stent. I also am concerned with how narrow his stricture is and how well he will tolerate a fully sized esophageal stent. I explained that we may have to try a small caliber stent (potentially even a biliary stent) to start the process and over time upsize the stent which would mean it may be awhile until he can have a regular diet. I also discussed behavioral modification he will need to make due to the reflux issues: head of bed elevation at least 30-45 degrees, no laying down 3 hours after eating. He will have to be on BID PPI.     He and his wife are amenable to proceeding.    RECOMMENDATIONS:  - Proceed with EGD with stent placement and endoscopic  suturing      Salazar Sepulveda MD  [facility]  Division of Gastroenterology and Hepatology  [facility] [address] [address]    ________________________________________________________________  HPI:  Yossi is a 75 year old male with mesothelioma causing extrinsic compression of the esophagus initially misdiagnosed as achalasia. The mesothelioma was unresectable and he has received chemotherapy. PEG tube was placed in March 2020. At this point his oncologic treatment is palliative. He has limited PO intake and has difficulty handling his oral secretions. His tube feeds run nearly continuously as higher rates he doesn't tolerate. 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    PMHx:  Past Medical History:   Diagnosis Date     History of immunotherapy     Ipilimumab/Nivolumab     Mesothelioma (H) 02/20/2020     Personal history of chemotherapy     Pemetrexed/Carboplatin/Bevacizumab     Seizures (H)     Patient reports most recent seizure in 2013       PSurgHx:  Past Surgical History:   Procedure Laterality Date     ENDOSCOPIC ULTRASOUND UPPER GASTROINTESTINAL TRACT (GI) N/A 2/20/2020    Procedure: ENDOSCOPIC ULTRASOUND WITH FINE NEEDLE ASPIRATION, ESOPHAGOSCOPY / UPPER GASTROINTESTINAL TRACT (GI) WITH BIOPSY  BIOPSY;  Surgeon: Salazar Sepulveda MD;  Location: [facility] OR     INSERT PORT VASCULAR ACCESS Right 3/17/2020    Procedure: INSERTION, VASCULAR ACCESS PORT RIGHT INTERNAL JUGULAR;  Surgeon: Sandeep Ramirez DO;  Location: [facility] OR     TONSILLECTOMY         MEDS:  Current Outpatient Medications   Medication     acetaminophen (TYLENOL) 325 MG tablet     albuterol (PROAIR HFA/PROVENTIL HFA/VENTOLIN HFA) 108 (90 Base) MCG/ACT inhaler     cholecalciferol 25 MCG (1000 UT) TABS     diphenoxylate-atropine (LOMOTIL) 2.5-0.025 MG tablet     folic acid (FOLVITE) 1 MG tablet     Loperamide HCl (IMODIUM A-D PO)     LORazepam  (ATIVAN) 0.5 MG tablet     Nutritional Supplements (ISOSOURCE 1.5 DIMA) LIQD     OLANZapine (ZYPREXA) 2.5 MG tablet     OLANZapine (ZYPREXA) 2.5 MG tablet     omeprazole (PRILOSEC) 2 mg/mL suspension     ondansetron (ZOFRAN-ODT) 8 MG ODT tab     opium tincture 10 MG/ML (1%) liquid     oxyCODONE (ROXICODONE) 5 MG tablet     prochlorperazine (COMPAZINE) 10 MG tablet     lamoTRIgine (LAMICTAL) 200 MG tablet     Current Facility-Administered Medications   Medication     heparin 100 UNIT/ML injection 5 mL     Facility-Administered Medications Ordered in Other Visits   Medication     heparin 100 UNIT/ML injection 5 mL     ALLERGIES:  No Known Allergies    Physical Exam  Gen: A&Ox3, NAD, cachectic   HEENT: Moist mucus membranes, no scleral icterus.  Lungs: no respiratory distress    LABS:  Infusion Therapy Visit on 09/16/2020   Component Date Value Ref Range Status     Sodium 09/16/2020 136  133 - 144 mmol/L Final     Potassium 09/16/2020 4.4  3.4 - 5.3 mmol/L Final     Chloride 09/16/2020 105  94 - 109 mmol/L Final     Carbon Dioxide 09/16/2020 27  20 - 32 mmol/L Final     Anion Gap 09/16/2020 4  3 - 14 mmol/L Final     Glucose 09/16/2020 141* 70 - 99 mg/dL Final     Urea Nitrogen 09/16/2020 17  7 - 30 mg/dL Final     Creatinine 09/16/2020 0.64* 0.66 - 1.25 mg/dL Final     GFR Estimate 09/16/2020 >90  >60 mL/min/[1.73_m2] Final    Comment: Non  GFR Calc  Starting 12/18/2018, serum creatinine based estimated GFR (eGFR) will be   calculated using the Chronic Kidney Disease Epidemiology Collaboration   (CKD-EPI) equation.       GFR Estimate If Black 09/16/2020 >90  >60 mL/min/[1.73_m2] Final    Comment:  GFR Calc  Starting 12/18/2018, serum creatinine based estimated GFR (eGFR) will be   calculated using the Chronic Kidney Disease Epidemiology Collaboration   (CKD-EPI) equation.       Calcium 09/16/2020 8.7  8.5 - 10.1 mg/dL Final     Bilirubin Total 09/16/2020 0.2  0.2 - 1.3 mg/dL Final      Albumin 09/16/2020 2.9* 3.4 - 5.0 g/dL Final     Protein Total 09/16/2020 6.7* 6.8 - 8.8 g/dL Final     Alkaline Phosphatase 09/16/2020 45  40 - 150 U/L Final     ALT 09/16/2020 31  0 - 70 U/L Final     AST 09/16/2020 14  0 - 45 U/L Final     WBC 09/16/2020 10.1  4.0 - 11.0 10e9/L Final     RBC Count 09/16/2020 4.57  4.4 - 5.9 10e12/L Final     Hemoglobin 09/16/2020 13.1* 13.3 - 17.7 g/dL Final     Hematocrit 09/16/2020 41.0  40.0 - 53.0 % Final     MCV 09/16/2020 90  78 - 100 fl Final     MCH 09/16/2020 28.7  26.5 - 33.0 pg Final     MCHC 09/16/2020 32.0  31.5 - 36.5 g/dL Final     RDW 09/16/2020 16.1* 10.0 - 15.0 % Final     Platelet Count 09/16/2020 283  150 - 450 10e9/L Final     Diff Method 09/16/2020 Automated Method   Final     % Neutrophils 09/16/2020 78.8  % Final     % Lymphocytes 09/16/2020 9.9  % Final     % Monocytes 09/16/2020 10.3  % Final     % Eosinophils 09/16/2020 0.0  % Final     % Basophils 09/16/2020 0.5  % Final     % Immature Granulocytes 09/16/2020 0.5  % Final     Nucleated RBCs 09/16/2020 0  0 /100 Final     Absolute Neutrophil 09/16/2020 7.9  1.6 - 8.3 10e9/L Final     Absolute Lymphocytes 09/16/2020 1.0  0.8 - 5.3 10e9/L Final     Absolute Monocytes 09/16/2020 1.0  0.0 - 1.3 10e9/L Final     Absolute Eosinophils 09/16/2020 0.0  0.0 - 0.7 10e9/L Final     Absolute Basophils 09/16/2020 0.1  0.0 - 0.2 10e9/L Final     Abs Immature Granulocytes 09/16/2020 0.1  0 - 0.4 10e9/L Final     Absolute Nucleated RBC 09/16/2020 0.0   Final               Again, thank you for allowing me to participate in the care of your patient.        Sincerely,        Salazar Sepulveda MD

## 2020-10-21 NOTE — PROGRESS NOTES
"Pradeep Figueroa Jr. is a 75 year old male who is being evaluated via a billable video visit.      The patient has been notified of following:     \"This video visit will be conducted via a call between you and your physician/provider. We have found that certain health care needs can be provided without the need for an in-person physical exam.  This service lets us provide the care you need with a video conversation.  If a prescription is necessary we can send it directly to your pharmacy.  If lab work is needed we can place an order for that and you can then stop by our lab to have the test done at a later time.    Video visits are billed at different rates depending on your insurance coverage.  Please reach out to your insurance provider with any questions.    If during the course of the call the physician/provider feels a video visit is not appropriate, you will not be charged for this service.\"    Patient has given verbal consent for Video visit? Yes    How would you like to obtain your AVS? MyChart     If you are dropped from the video visit, the video invite should be resent to: Text to cell phone: [phone]     Wife's Cell: [phone] (just in case you have trouble)    Will anyone else be joining your video visit? No     {If patient encounters technical issues they should call [phone] :[id number]}    I have reviewed and updated the patient's allergies and pt confirmed any changes to their medication list.  Patient was asked to provide any patient recorded vital signs, height and/or weight.  Please see \"Patient Reported Vital Signs\" tab for that information.  Patient instructed to be in the virtual waiting room 10-15 minutes prior to appointment time.    There were no vitals taken for this visit.      Concerns: ***      Refills: Folic Acid        Keshawn Muir, ROSALINE        Video-Visit Details    Type of service:  Video Visit    Video Start Time: {video visit start/end time:791162}  Video End Time: {video " "visit start/end time:152948}    Originating Location (pt. Location): {video visit patient location:208807::\"Home\"}    Distant Location (provider location):  [facility] CANCER [facility]     Platform used for Video Visit: {Virtual Visit Platforms:973894::\"AmWell\"}    {signature options:750469}        "

## 2020-10-21 NOTE — PROGRESS NOTES
"Pradeep Figueroa Jr. is a 75 year old male who is being evaluated via a billable video visit.      The patient has been notified of following:     \"This video visit will be conducted via a call between you and your physician/provider. We have found that certain health care needs can be provided without the need for an in-person physical exam.  This service lets us provide the care you need with a video conversation.  If a prescription is necessary we can send it directly to your pharmacy.  If lab work is needed we can place an order for that and you can then stop by our lab to have the test done at a later time.    Video visits are billed at different rates depending on your insurance coverage.  Please reach out to your insurance provider with any questions.    If during the course of the call the physician/provider feels a video visit is not appropriate, you will not be charged for this service.\"    Patient has given verbal consent for Video visit? Yes    How would you like to obtain your AVS? MyChart     If you are dropped from the video visit, the video invite should be resent to: Text to cell phone: [phone]     Wife's Cell: [phone] (just in case you have trouble)    Will anyone else be joining your video visit? No         I have reviewed and updated the patient's allergies and pt confirmed any changes to their medication list.  Patient was asked to provide any patient recorded vital signs, height and/or weight.  Please see \"Patient Reported Vital Signs\" tab for that information.  Patient instructed to be in the virtual waiting room 10-15 minutes prior to appointment time.    There were no vitals taken for this visit.      Concerns: none      Refills: Folic Acid        Keshawn Muir, EMT        Video-Visit Details    Type of service:  Video Visit    Video Start Time: 7:00 AM  Video End Time: 7:43 AM    Originating Location (pt. Location): Home    Distant Location (provider location):  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 I explained that his stricture is atypical of other esophageal cancer strictures in that it is extrinsic. For that reason esophageal stent placement has a higher rate of migration. I explained that we can mitigate that migration risk by suturing the stent in place to the esophageal wall although the risk is still there. I explained what would happen if the stent did migrate and that we would have to do an endoscopy to retrieve the stent. I also am concerned with how narrow his stricture is and how well he will tolerate a fully sized esophageal stent. I explained that we may have to try a small caliber stent (potentially even a biliary stent) to start the process and over time upsize the stent which would mean it may be awhile until he can have a regular diet. I also discussed behavioral modification he will need to make due to the reflux issues: head of bed elevation at least 30-45 degrees, no laying down 3 hours after eating. He will have to be on BID PPI.     He and his wife are amenable to proceeding.    RECOMMENDATIONS:  - Proceed with EGD with stent placement and endoscopic suturing      Salazar Sepulveda MD  [facility]  Division of Gastroenterology and Hepatology  [address] [address]    ________________________________________________________________  HPI:  Yossi is a 75 year old male  with mesothelioma causing extrinsic compression of the esophagus initially misdiagnosed as achalasia. The mesothelioma was unresectable and he has received chemotherapy. PEG tube was placed in March 2020. At this point his oncologic treatment is palliative. He has limited PO intake and has difficulty handling his oral secretions. His tube feeds run nearly continuously as higher rates he doesn't tolerate. 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    PMHx:  Past Medical History:   Diagnosis Date     History of immunotherapy     Ipilimumab/Nivolumab     Mesothelioma (H) 02/20/2020     Personal history of chemotherapy     Pemetrexed/Carboplatin/Bevacizumab     Seizures (H)     Patient reports most recent seizure in 2013       PSurgHx:  Past Surgical History:   Procedure Laterality Date     ENDOSCOPIC ULTRASOUND UPPER GASTROINTESTINAL TRACT (GI) N/A 2/20/2020    Procedure: ENDOSCOPIC ULTRASOUND WITH FINE NEEDLE ASPIRATION, ESOPHAGOSCOPY / UPPER GASTROINTESTINAL TRACT (GI) WITH BIOPSY  BIOPSY;  Surgeon: Salazar Sepulveda MD;  Location: [facility] OR     INSERT PORT VASCULAR ACCESS Right 3/17/2020    Procedure: INSERTION, VASCULAR ACCESS PORT RIGHT INTERNAL JUGULAR;  Surgeon: Sandeep Ramirez DO;  Location: [facility] OR     TONSILLECTOMY         MEDS:  Current Outpatient Medications   Medication     acetaminophen (TYLENOL) 325 MG tablet     albuterol (PROAIR HFA/PROVENTIL HFA/VENTOLIN HFA) 108 (90 Base) MCG/ACT inhaler     cholecalciferol 25 MCG (1000 UT) TABS     diphenoxylate-atropine (LOMOTIL) 2.5-0.025 MG tablet     folic acid (FOLVITE) 1 MG tablet     Loperamide HCl (IMODIUM A-D PO)     LORazepam (ATIVAN) 0.5 MG tablet     Nutritional Supplements (ISOSOURCE 1.5 DIMA) LIQD     OLANZapine (ZYPREXA) 2.5 MG tablet     OLANZapine (ZYPREXA) 2.5 MG tablet     omeprazole (PRILOSEC) 2 mg/mL suspension     ondansetron (ZOFRAN-ODT) 8 MG ODT tab     opium tincture 10 MG/ML (1%) liquid      oxyCODONE (ROXICODONE) 5 MG tablet     prochlorperazine (COMPAZINE) 10 MG tablet     lamoTRIgine (LAMICTAL) 200 MG tablet     Current Facility-Administered Medications   Medication     heparin 100 UNIT/ML injection 5 mL     Facility-Administered Medications Ordered in Other Visits   Medication     heparin 100 UNIT/ML injection 5 mL     ALLERGIES:  No Known Allergies    Physical Exam  Gen: A&Ox3, NAD, cachectic   HEENT: Moist mucus membranes, no scleral icterus.  Lungs: no respiratory distress    LABS:  Infusion Therapy Visit on 09/16/2020   Component Date Value Ref Range Status     Sodium 09/16/2020 136  133 - 144 mmol/L Final     Potassium 09/16/2020 4.4  3.4 - 5.3 mmol/L Final     Chloride 09/16/2020 105  94 - 109 mmol/L Final     Carbon Dioxide 09/16/2020 27  20 - 32 mmol/L Final     Anion Gap 09/16/2020 4  3 - 14 mmol/L Final     Glucose 09/16/2020 141* 70 - 99 mg/dL Final     Urea Nitrogen 09/16/2020 17  7 - 30 mg/dL Final     Creatinine 09/16/2020 0.64* 0.66 - 1.25 mg/dL Final     GFR Estimate 09/16/2020 >90  >60 mL/min/[1.73_m2] Final    Comment: Non  GFR Calc  Starting 12/18/2018, serum creatinine based estimated GFR (eGFR) will be   calculated using the Chronic Kidney Disease Epidemiology Collaboration   (CKD-EPI) equation.       GFR Estimate If Black 09/16/2020 >90  >60 mL/min/[1.73_m2] Final    Comment:  GFR Calc  Starting 12/18/2018, serum creatinine based estimated GFR (eGFR) will be   calculated using the Chronic Kidney Disease Epidemiology Collaboration   (CKD-EPI) equation.       Calcium 09/16/2020 8.7  8.5 - 10.1 mg/dL Final     Bilirubin Total 09/16/2020 0.2  0.2 - 1.3 mg/dL Final     Albumin 09/16/2020 2.9* 3.4 - 5.0 g/dL Final     Protein Total 09/16/2020 6.7* 6.8 - 8.8 g/dL Final     Alkaline Phosphatase 09/16/2020 45  40 - 150 U/L Final     ALT 09/16/2020 31  0 - 70 U/L Final     AST 09/16/2020 14  0 - 45 U/L Final     WBC 09/16/2020 10.1  4.0 - 11.0 10e9/L Final      RBC Count 09/16/2020 4.57  4.4 - 5.9 10e12/L Final     Hemoglobin 09/16/2020 13.1* 13.3 - 17.7 g/dL Final     Hematocrit 09/16/2020 41.0  40.0 - 53.0 % Final     MCV 09/16/2020 90  78 - 100 fl Final     MCH 09/16/2020 28.7  26.5 - 33.0 pg Final     MCHC 09/16/2020 32.0  31.5 - 36.5 g/dL Final     RDW 09/16/2020 16.1* 10.0 - 15.0 % Final     Platelet Count 09/16/2020 283  150 - 450 10e9/L Final     Diff Method 09/16/2020 Automated Method   Final     % Neutrophils 09/16/2020 78.8  % Final     % Lymphocytes 09/16/2020 9.9  % Final     % Monocytes 09/16/2020 10.3  % Final     % Eosinophils 09/16/2020 0.0  % Final     % Basophils 09/16/2020 0.5  % Final     % Immature Granulocytes 09/16/2020 0.5  % Final     Nucleated RBCs 09/16/2020 0  0 /100 Final     Absolute Neutrophil 09/16/2020 7.9  1.6 - 8.3 10e9/L Final     Absolute Lymphocytes 09/16/2020 1.0  0.8 - 5.3 10e9/L Final     Absolute Monocytes 09/16/2020 1.0  0.0 - 1.3 10e9/L Final     Absolute Eosinophils 09/16/2020 0.0  0.0 - 0.7 10e9/L Final     Absolute Basophils 09/16/2020 0.1  0.0 - 0.2 10e9/L Final     Abs Immature Granulocytes 09/16/2020 0.1  0 - 0.4 10e9/L Final     Absolute Nucleated RBC 09/16/2020 0.0   Final

## 2020-10-21 NOTE — PROGRESS NOTES
Care Coordination Telephone Call  [facility]     Called by patient's wife to discuss plan for liquid Prilosec.  She states that they have Prilosec liquid at home and they have one refill also.    I have discussed at length the need for history and physical within 30 days of procedure. Covid testing scheduled for next Monday.  They will  home from procedure and his wife will be with him.     I have asked the patient to call with any additional questions or concerns and have provided my contact information.    Sheila GASCAN, HNBC, STAR-T  RN Care Coordinator  [facility]  Ph: [phone]  FAX: [phone]

## 2020-10-22 ENCOUNTER — TELEPHONE (OUTPATIENT)
Dept: ONCOLOGY | Facility: CLINIC | Age: 75
End: 2020-10-22

## 2020-10-22 NOTE — PROGRESS NOTES
Nutrition Services:     Writer spoke with Nataliia today regarding EN formula questions and accessibility for when he is admitted next week for a procedure.     Yossi will be admitted next week after his esophogeal stent on the 28th and will most likely stay for 1-2 days.   He was recently admitted to [facility] for 2 weeks, ended up on TPN and finally went home on EN pump feeds with Osmolite 1.2, from [facility].   He had struggled with diarrhea with bolus and pump feeds from Isosource 1.5 shadi, [employer] Peptide 1.5 and Peptamen 1.5.  After failure to tolerate tube feeding while hospitalized, he was started on TPN.   TPN was discontinued when he started to tolerate pump feeds with Osmolite 1.2 shadi, 70mL/hr x 24 hours.     He remains on Osmolite 1.2 at 70mL/hr x 24 hours and so far, he has been tolerating this formula well per Nataliia's report.     RD sent message to nutrition manager regarding accessibility to this formula as it is not on [employer] tube feeding formulary.   CN will be ordering 1 case of this formula for Yossi's hospital admission next week.     Yossi was discharged from [facility] services as he started EN services with [facility] while at [facility].    [facility] will no longer be serving Yossi for his enteral feeding and supplies.     Writer will be available for Yossi and Nataliia suresh.     Magaly Breaux RD, [facility] [place]  [phone]

## 2020-10-26 ENCOUNTER — PATIENT OUTREACH (OUTPATIENT)
Dept: GASTROENTEROLOGY | Facility: CLINIC | Age: 75
End: 2020-10-26

## 2020-10-26 DIAGNOSIS — C45.0 MESOTHELIOMA (PLEURAL) (H): ICD-10-CM

## 2020-10-26 DIAGNOSIS — Z11.59 ENCOUNTER FOR SCREENING FOR OTHER VIRAL DISEASES: ICD-10-CM

## 2020-10-26 PROCEDURE — U0003 INFECTIOUS AGENT DETECTION BY NUCLEIC ACID (DNA OR RNA); SEVERE ACUTE RESPIRATORY SYNDROME CORONAVIRUS 2 (SARS-COV-2) (CORONAVIRUS DISEASE [COVID-19]), AMPLIFIED PROBE TECHNIQUE, MAKING USE OF HIGH THROUGHPUT TECHNOLOGIES AS DESCRIBED BY CMS-2020-01-R: HCPCS | Performed by: INTERNAL MEDICINE

## 2020-10-26 NOTE — PROGRESS NOTES
Care Coordination Telephone Call  [facility]     Called patient's wife who has questions about logistics for this Thursday procedure.  Provided information and reassured them that someone from Preoperative nursing will call her them with final details.    I have asked the patient to call with any additional questions or concerns and have provided my contact information.    Sheila GASCAN, HNBC, STAR-T  RN Care Coordinator  [facility]  Ph: [phone]  FAX: [phone]

## 2020-10-27 LAB
SARS-COV-2 RNA SPEC QL NAA+PROBE: NOT DETECTED
SPECIMEN SOURCE: NORMAL

## 2020-10-28 ENCOUNTER — ANESTHESIA EVENT (OUTPATIENT)
Dept: SURGERY | Facility: CLINIC | Age: 75
End: 2020-10-28
Payer: MEDICARE

## 2020-10-28 ASSESSMENT — ENCOUNTER SYMPTOMS: SEIZURES: 1

## 2020-10-28 NOTE — ANESTHESIA PREPROCEDURE EVALUATION
"Anesthesia Pre-Procedure Evaluation    Patient: Pradeep Figueroa Jr.   MRN:     [id number] Gender:   male   Age:    75 year old :      1945        Preoperative Diagnosis: Esophageal stricture [K22.2]   Procedure(s):  ESOPHAGOGASTRODUODENOSCOPY (EGD)  with esophageal stent placement and suturing     LABS:  CBC:   Lab Results   Component Value Date    WBC 10.1 2020    WBC 5.9 2020    HGB 13.1 (L) 2020    HGB 13.9 2020    HCT 41.0 2020    HCT 42.6 2020     2020     2020     BMP:   Lab Results   Component Value Date     2020     2020    POTASSIUM 4.4 2020    POTASSIUM 4.1 2020    CHLORIDE 105 2020    CHLORIDE 99 2020    CO2 27 2020    CO2 29 2020    BUN 17 2020    BUN 14 2020    CR 0.64 (L) 2020    CR 0.71 2020     (H) 2020     (H) 2020     COAGS:   Lab Results   Component Value Date    INR 1.13 2020     POC:   Lab Results   Component Value Date    BGM 96 2020     OTHER:   Lab Results   Component Value Date    DIMA 8.7 2020    MAG 2.3 2020    ALBUMIN 2.9 (L) 2020    PROTTOTAL 6.7 (L) 2020    ALT 31 2020    AST 14 2020    ALKPHOS 45 2020    BILITOTAL 0.2 2020    TSH 1.59 2020        Preop Vitals    BP Readings from Last 3 Encounters:   20 117/80   20 133/80   20 111/64    Pulse Readings from Last 3 Encounters:   20 103   20 103   20 95      Resp Readings from Last 3 Encounters:   20 18   20 16   20 18    SpO2 Readings from Last 3 Encounters:   20 96%   20 95%   20 95%      Temp Readings from Last 1 Encounters:   20 36.8  C (98.2  F) (Oral)    Ht Readings from Last 1 Encounters:   20 1.651 m (5' 5\")      Wt Readings from Last 1 Encounters:   20 55.8 kg (123 lb)    Estimated body mass index is " "20.47 kg/m  as calculated from the following:    Height as of 5/28/20: 1.651 m (5' 5\").    Weight as of 9/17/20: 55.8 kg (123 lb).     LDA:  Port A Cath Single 03/17/20 Right Chest wall (Active)   Number of days: 225       Gastrostomy/Enterostomy Percutaneous endoscopic gastrostomy (PEG) LUQ 1 20 fr (Active)   Number of days: 211        Past Medical History:   Diagnosis Date     History of immunotherapy     Ipilimumab/Nivolumab     Mesothelioma (H) 02/20/2020     Personal history of chemotherapy     Pemetrexed/Carboplatin/Bevacizumab     Seizures (H)     Patient reports most recent seizure in 2013      Past Surgical History:   Procedure Laterality Date     ENDOSCOPIC ULTRASOUND UPPER GASTROINTESTINAL TRACT (GI) N/A 2/20/2020    Procedure: ENDOSCOPIC ULTRASOUND WITH FINE NEEDLE ASPIRATION, ESOPHAGOSCOPY / UPPER GASTROINTESTINAL TRACT (GI) WITH BIOPSY  BIOPSY;  Surgeon: Salazar Sepulveda MD;  Location: [facility] OR     INSERT PORT VASCULAR ACCESS Right 3/17/2020    Procedure: INSERTION, VASCULAR ACCESS PORT RIGHT INTERNAL JUGULAR;  Surgeon: Sandeep Ramirez DO;  Location: [facility] OR     TONSILLECTOMY        No Known Allergies     Anesthesia Evaluation     . Pt has had prior anesthetic. Type: General    No history of anesthetic complications          ROS/MED HX    ENT/Pulmonary: Comment: Mesothelioma  ALVARADO/ SOB unchanged--improves w/ neb - neg pulmonary ROS     Neurologic:     (+)seizures last seizure: 2013 other neuro tremor    Cardiovascular:     (+) Dyslipidemia, ----. : . . . :. .       METS/Exercise Tolerance:     Hematologic:     (+) Anemia, -      Musculoskeletal:  - neg musculoskeletal ROS       GI/Hepatic: Comment: Achalasia   Malnutrition         Renal/Genitourinary:     (+) Nephrolithiasis ,       Endo:  - neg endo ROS       Psychiatric:  - neg psychiatric ROS       Infectious Disease:  - neg infectious disease ROS       Malignancy:      - no malignancy   Other:                         PHYSICAL EXAM:   Mental " Status/Neuro: A/A/O   Airway: Facies: Feasible  Mallampati: I  Mouth/Opening: Full  TM distance: > 6 cm  Neck ROM: Full   Respiratory: Auscultation: Wheezing     Resp. Rate: Normal     Resp. Effort: Increased      CV: Rhythm: Regular  Rate: Age appropriate  Heart: Normal Sounds  Edema: None   Comments:      Dental: Normal Dentition                Assessment:   ASA SCORE: 2    H&P: History and physical reviewed and following examination; no interval change.   Smoking Status:  Non-Smoker/Unknown   NPO Status: NPO Appropriate     Plan:   Anes. Type:  General   Pre-Medication: None   Induction:  IV (Standard)   Airway: ETT; Oral   Access/Monitoring: PIV   Maintenance: Balanced     Postop Plan:   Postop Pain: Opioids  Postop Sedation/Airway: Not planned     PONV Management:   Adult Risk Factors:, Non-Smoker, Postop Opioids   Prevention: Ondansetron, Dexamethasone     CONSENT: Direct conversation   Plan and risks discussed with: Patient   Blood Products: Consent Deferred (Minimal Blood Loss)                   Adi Mayer MD

## 2020-10-29 ENCOUNTER — PATIENT OUTREACH (OUTPATIENT)
Dept: GASTROENTEROLOGY | Facility: CLINIC | Age: 75
End: 2020-10-29

## 2020-10-29 ENCOUNTER — PATIENT OUTREACH (OUTPATIENT)
Dept: ONCOLOGY | Facility: CLINIC | Age: 75
End: 2020-10-29

## 2020-10-29 ENCOUNTER — ANESTHESIA (OUTPATIENT)
Dept: SURGERY | Facility: CLINIC | Age: 75
End: 2020-10-29
Payer: MEDICARE

## 2020-10-29 ENCOUNTER — HOSPITAL ENCOUNTER (OUTPATIENT)
Facility: CLINIC | Age: 75
Discharge: HOME | End: 2020-10-29
Attending: INTERNAL MEDICINE | Admitting: INTERNAL MEDICINE
Payer: MEDICARE

## 2020-10-29 ENCOUNTER — PREP FOR PROCEDURE (OUTPATIENT)
Dept: GASTROENTEROLOGY | Facility: CLINIC | Age: 75
End: 2020-10-29

## 2020-10-29 ENCOUNTER — APPOINTMENT (OUTPATIENT)
Dept: GENERAL RADIOLOGY | Facility: CLINIC | Age: 75
End: 2020-10-29
Attending: INTERNAL MEDICINE
Payer: MEDICARE

## 2020-10-29 VITALS
HEIGHT: 65 IN | OXYGEN SATURATION: 94 % | WEIGHT: 116.84 LBS | TEMPERATURE: 98.1 F | SYSTOLIC BLOOD PRESSURE: 111 MMHG | HEART RATE: 103 BPM | BODY MASS INDEX: 19.47 KG/M2 | RESPIRATION RATE: 16 BRPM | DIASTOLIC BLOOD PRESSURE: 72 MMHG

## 2020-10-29 DIAGNOSIS — K22.2 ESOPHAGEAL STRICTURE: ICD-10-CM

## 2020-10-29 DIAGNOSIS — C45.0 MESOTHELIOMA (PLEURAL) (H): ICD-10-CM

## 2020-10-29 DIAGNOSIS — K22.2 ESOPHAGEAL STRICTURE: Primary | ICD-10-CM

## 2020-10-29 LAB
GLUCOSE BLDC GLUCOMTR-MCNC: 98 MG/DL (ref 70–99)
INR PPP: 1.09 (ref 0.86–1.14)

## 2020-10-29 PROCEDURE — 360N000017 HC SURGERY LEVEL 2 EA 15 ADDTL MIN - UMMC: Performed by: INTERNAL MEDICINE

## 2020-10-29 PROCEDURE — C1769 GUIDE WIRE: HCPCS | Performed by: INTERNAL MEDICINE

## 2020-10-29 PROCEDURE — 370N000001 HC ANESTHESIA TECHNICAL FEE, 1ST 30 MIN: Performed by: INTERNAL MEDICINE

## 2020-10-29 PROCEDURE — 360N000016 HC SURGERY LEVEL 2 1ST 30 MIN - UMMC: Performed by: INTERNAL MEDICINE

## 2020-10-29 PROCEDURE — 761N000003 HC RECOVERY PHASE 1 LEVEL 2 FIRST HR: Performed by: INTERNAL MEDICINE

## 2020-10-29 PROCEDURE — 258N000003 HC RX IP 258 OP 636

## 2020-10-29 PROCEDURE — 999N000181 XR SURGERY CARM FLUORO GREATER THAN 5 MIN W STILLS

## 2020-10-29 PROCEDURE — 272N000001 HC OR GENERAL SUPPLY STERILE: Performed by: INTERNAL MEDICINE

## 2020-10-29 PROCEDURE — 85610 PROTHROMBIN TIME: CPT | Performed by: STUDENT IN AN ORGANIZED HEALTH CARE EDUCATION/TRAINING PROGRAM

## 2020-10-29 PROCEDURE — C1874 STENT, COATED/COV W/DEL SYS: HCPCS | Performed by: INTERNAL MEDICINE

## 2020-10-29 PROCEDURE — 250N000009 HC RX 250: Performed by: INTERNAL MEDICINE

## 2020-10-29 PROCEDURE — C1726 CATH, BAL DIL, NON-VASCULAR: HCPCS | Performed by: INTERNAL MEDICINE

## 2020-10-29 PROCEDURE — 250N000011 HC RX IP 250 OP 636

## 2020-10-29 PROCEDURE — 43212 ESOPHAGOSCOP STENT PLACEMENT: CPT | Performed by: INTERNAL MEDICINE

## 2020-10-29 PROCEDURE — 250N000009 HC RX 250

## 2020-10-29 PROCEDURE — 761N000007 HC RECOVERY PHASE 2 EACH 15 MINS: Performed by: INTERNAL MEDICINE

## 2020-10-29 PROCEDURE — 999N000139 HC STATISTIC PRE-PROCEDURE ASSESSMENT II: Performed by: INTERNAL MEDICINE

## 2020-10-29 PROCEDURE — 370N000002 HC ANESTHESIA TECHNICAL FEE, EACH ADDTL 15 MIN: Performed by: INTERNAL MEDICINE

## 2020-10-29 PROCEDURE — 250N000011 HC RX IP 250 OP 636: Performed by: ANESTHESIOLOGY

## 2020-10-29 PROCEDURE — 250N000003 HC SEVOFLURANE, EA 15 MIN: Performed by: INTERNAL MEDICINE

## 2020-10-29 PROCEDURE — 999N001017 HC STATISTIC GLUCOSE BY METER IP

## 2020-10-29 PROCEDURE — 49450 REPLACE G/C TUBE PERC: CPT | Mod: 51 | Performed by: INTERNAL MEDICINE

## 2020-10-29 PROCEDURE — 255N000002 HC RX 255 OP 636: Performed by: INTERNAL MEDICINE

## 2020-10-29 DEVICE — IMPLANTABLE DEVICE: Type: IMPLANTABLE DEVICE | Site: ESOPHAGUS | Status: FUNCTIONAL

## 2020-10-29 RX ORDER — SODIUM CHLORIDE, SODIUM LACTATE, POTASSIUM CHLORIDE, CALCIUM CHLORIDE 600; 310; 30; 20 MG/100ML; MG/100ML; MG/100ML; MG/100ML
INJECTION, SOLUTION INTRAVENOUS CONTINUOUS PRN
Status: DISCONTINUED | OUTPATIENT
Start: 2020-10-29 | End: 2020-10-29

## 2020-10-29 RX ORDER — SODIUM CHLORIDE, SODIUM LACTATE, POTASSIUM CHLORIDE, CALCIUM CHLORIDE 600; 310; 30; 20 MG/100ML; MG/100ML; MG/100ML; MG/100ML
INJECTION, SOLUTION INTRAVENOUS CONTINUOUS
Status: DISCONTINUED | OUTPATIENT
Start: 2020-10-29 | End: 2020-10-29 | Stop reason: HOSPADM

## 2020-10-29 RX ORDER — ONDANSETRON 4 MG/1
4 TABLET, ORALLY DISINTEGRATING ORAL EVERY 30 MIN PRN
Status: DISCONTINUED | OUTPATIENT
Start: 2020-10-29 | End: 2020-10-29 | Stop reason: HOSPADM

## 2020-10-29 RX ORDER — LIDOCAINE HYDROCHLORIDE 20 MG/ML
INJECTION, SOLUTION INFILTRATION; PERINEURAL PRN
Status: DISCONTINUED | OUTPATIENT
Start: 2020-10-29 | End: 2020-10-29

## 2020-10-29 RX ORDER — MEPERIDINE HYDROCHLORIDE 25 MG/ML
12.5 INJECTION INTRAMUSCULAR; INTRAVENOUS; SUBCUTANEOUS
Status: DISCONTINUED | OUTPATIENT
Start: 2020-10-29 | End: 2020-10-29 | Stop reason: HOSPADM

## 2020-10-29 RX ORDER — DRAINAGE BAG
EACH MISCELLANEOUS
COMMUNITY
Start: 2020-10-09

## 2020-10-29 RX ORDER — HYDROMORPHONE HYDROCHLORIDE 1 MG/ML
.3-.5 INJECTION, SOLUTION INTRAMUSCULAR; INTRAVENOUS; SUBCUTANEOUS EVERY 10 MIN PRN
Status: DISCONTINUED | OUTPATIENT
Start: 2020-10-29 | End: 2020-10-29 | Stop reason: HOSPADM

## 2020-10-29 RX ORDER — ONDANSETRON 2 MG/ML
4 INJECTION INTRAMUSCULAR; INTRAVENOUS EVERY 30 MIN PRN
Status: DISCONTINUED | OUTPATIENT
Start: 2020-10-29 | End: 2020-10-29 | Stop reason: HOSPADM

## 2020-10-29 RX ORDER — HEPARIN SODIUM,PORCINE 10 UNIT/ML
5-10 VIAL (ML) INTRAVENOUS
Status: CANCELLED | OUTPATIENT
Start: 2020-10-29

## 2020-10-29 RX ORDER — HEPARIN SODIUM,PORCINE 10 UNIT/ML
5-10 VIAL (ML) INTRAVENOUS EVERY 24 HOURS
Status: CANCELLED | OUTPATIENT
Start: 2020-10-29

## 2020-10-29 RX ORDER — ONDANSETRON 2 MG/ML
4 INJECTION INTRAMUSCULAR; INTRAVENOUS
Status: DISCONTINUED | OUTPATIENT
Start: 2020-10-29 | End: 2020-10-29 | Stop reason: HOSPADM

## 2020-10-29 RX ORDER — NALOXONE HYDROCHLORIDE 0.4 MG/ML
.1-.4 INJECTION, SOLUTION INTRAMUSCULAR; INTRAVENOUS; SUBCUTANEOUS
Status: DISCONTINUED | OUTPATIENT
Start: 2020-10-29 | End: 2020-10-29 | Stop reason: HOSPADM

## 2020-10-29 RX ORDER — PROPOFOL 10 MG/ML
INJECTION, EMULSION INTRAVENOUS CONTINUOUS PRN
Status: DISCONTINUED | OUTPATIENT
Start: 2020-10-29 | End: 2020-10-29

## 2020-10-29 RX ORDER — FLUMAZENIL 0.1 MG/ML
0.2 INJECTION, SOLUTION INTRAVENOUS
Status: CANCELLED | OUTPATIENT
Start: 2020-10-29 | End: 2020-10-29

## 2020-10-29 RX ORDER — EPHEDRINE SULFATE 50 MG/ML
INJECTION, SOLUTION INTRAMUSCULAR; INTRAVENOUS; SUBCUTANEOUS PRN
Status: DISCONTINUED | OUTPATIENT
Start: 2020-10-29 | End: 2020-10-29

## 2020-10-29 RX ORDER — OXYCODONE HYDROCHLORIDE 5 MG/1
5-10 TABLET ORAL EVERY 4 HOURS PRN
Qty: 60 TABLET | Refills: 0 | Status: SHIPPED | OUTPATIENT
Start: 2020-10-29

## 2020-10-29 RX ORDER — LIDOCAINE 40 MG/G
CREAM TOPICAL
Status: DISCONTINUED | OUTPATIENT
Start: 2020-10-29 | End: 2020-10-29 | Stop reason: HOSPADM

## 2020-10-29 RX ORDER — ONDANSETRON 4 MG/1
4 TABLET, ORALLY DISINTEGRATING ORAL EVERY 6 HOURS PRN
Status: CANCELLED | OUTPATIENT
Start: 2020-10-29

## 2020-10-29 RX ORDER — PROPOFOL 10 MG/ML
INJECTION, EMULSION INTRAVENOUS PRN
Status: DISCONTINUED | OUTPATIENT
Start: 2020-10-29 | End: 2020-10-29

## 2020-10-29 RX ORDER — OMEPRAZOLE 40 MG/1
40 CAPSULE, DELAYED RELEASE ORAL 2 TIMES DAILY
Qty: 120 CAPSULE | Refills: 4 | Status: SHIPPED | OUTPATIENT
Start: 2020-10-29

## 2020-10-29 RX ORDER — PHENYLEPHRINE HCL IN 0.9% NACL 50MG/250ML
0.5-6 PLASTIC BAG, INJECTION (ML) INTRAVENOUS CONTINUOUS
Status: DISCONTINUED | OUTPATIENT
Start: 2020-10-29 | End: 2020-10-29 | Stop reason: HOSPADM

## 2020-10-29 RX ORDER — ONDANSETRON 2 MG/ML
INJECTION INTRAMUSCULAR; INTRAVENOUS PRN
Status: DISCONTINUED | OUTPATIENT
Start: 2020-10-29 | End: 2020-10-29

## 2020-10-29 RX ORDER — NALOXONE HYDROCHLORIDE 0.4 MG/ML
.1-.4 INJECTION, SOLUTION INTRAMUSCULAR; INTRAVENOUS; SUBCUTANEOUS
Status: CANCELLED | OUTPATIENT
Start: 2020-10-29 | End: 2020-10-30

## 2020-10-29 RX ORDER — ONDANSETRON 2 MG/ML
4 INJECTION INTRAMUSCULAR; INTRAVENOUS EVERY 6 HOURS PRN
Status: CANCELLED | OUTPATIENT
Start: 2020-10-29

## 2020-10-29 RX ORDER — HEPARIN SODIUM (PORCINE) LOCK FLUSH IV SOLN 100 UNIT/ML 100 UNIT/ML
5 SOLUTION INTRAVENOUS
Status: DISCONTINUED | OUTPATIENT
Start: 2020-10-29 | End: 2020-10-29 | Stop reason: HOSPADM

## 2020-10-29 RX ORDER — FENTANYL CITRATE 50 UG/ML
INJECTION, SOLUTION INTRAMUSCULAR; INTRAVENOUS PRN
Status: DISCONTINUED | OUTPATIENT
Start: 2020-10-29 | End: 2020-10-29

## 2020-10-29 RX ORDER — IOPAMIDOL 510 MG/ML
INJECTION, SOLUTION INTRAVASCULAR PRN
Status: DISCONTINUED | OUTPATIENT
Start: 2020-10-29 | End: 2020-10-29 | Stop reason: HOSPADM

## 2020-10-29 RX ORDER — PROCHLORPERAZINE MALEATE 5 MG
5 TABLET ORAL EVERY 6 HOURS PRN
Status: CANCELLED | OUTPATIENT
Start: 2020-10-29

## 2020-10-29 RX ORDER — LAMOTRIGINE 200 MG/1
200 TABLET ORAL 2 TIMES DAILY
COMMUNITY

## 2020-10-29 RX ORDER — DEXAMETHASONE SODIUM PHOSPHATE 4 MG/ML
INJECTION, SOLUTION INTRA-ARTICULAR; INTRALESIONAL; INTRAMUSCULAR; INTRAVENOUS; SOFT TISSUE PRN
Status: DISCONTINUED | OUTPATIENT
Start: 2020-10-29 | End: 2020-10-29

## 2020-10-29 RX ORDER — HEPARIN SODIUM (PORCINE) LOCK FLUSH IV SOLN 100 UNIT/ML 100 UNIT/ML
5 SOLUTION INTRAVENOUS
Status: CANCELLED | OUTPATIENT
Start: 2020-10-29

## 2020-10-29 RX ORDER — FENTANYL CITRATE 50 UG/ML
25-50 INJECTION, SOLUTION INTRAMUSCULAR; INTRAVENOUS
Status: DISCONTINUED | OUTPATIENT
Start: 2020-10-29 | End: 2020-10-29 | Stop reason: HOSPADM

## 2020-10-29 RX ADMIN — PROPOFOL 20 MCG/KG/MIN: 10 INJECTION, EMULSION INTRAVENOUS at 08:13

## 2020-10-29 RX ADMIN — LIDOCAINE HYDROCHLORIDE 100 MG: 20 INJECTION, SOLUTION INFILTRATION; PERINEURAL at 08:13

## 2020-10-29 RX ADMIN — PHENYLEPHRINE HYDROCHLORIDE 200 MCG: 10 INJECTION INTRAVENOUS at 08:10

## 2020-10-29 RX ADMIN — DEXAMETHASONE SODIUM PHOSPHATE 8 MG: 4 INJECTION, SOLUTION INTRA-ARTICULAR; INTRALESIONAL; INTRAMUSCULAR; INTRAVENOUS; SOFT TISSUE at 08:31

## 2020-10-29 RX ADMIN — FENTANYL CITRATE 100 MCG: 50 INJECTION, SOLUTION INTRAMUSCULAR; INTRAVENOUS at 08:13

## 2020-10-29 RX ADMIN — SUGAMMADEX 200 MG: 100 INJECTION, SOLUTION INTRAVENOUS at 09:23

## 2020-10-29 RX ADMIN — ONDANSETRON 4 MG: 2 INJECTION INTRAMUSCULAR; INTRAVENOUS at 08:49

## 2020-10-29 RX ADMIN — ROCURONIUM BROMIDE 40 MG: 10 INJECTION INTRAVENOUS at 08:13

## 2020-10-29 RX ADMIN — FENTANYL CITRATE 50 MCG: 50 INJECTION, SOLUTION INTRAMUSCULAR; INTRAVENOUS at 10:19

## 2020-10-29 RX ADMIN — SODIUM CHLORIDE, POTASSIUM CHLORIDE, SODIUM LACTATE AND CALCIUM CHLORIDE: 600; 310; 30; 20 INJECTION, SOLUTION INTRAVENOUS at 08:00

## 2020-10-29 RX ADMIN — PHENYLEPHRINE HYDROCHLORIDE 200 MCG: 10 INJECTION INTRAVENOUS at 08:44

## 2020-10-29 RX ADMIN — HEPARIN SODIUM (PORCINE) LOCK FLUSH IV SOLN 100 UNIT/ML 5 ML: 100 SOLUTION at 11:36

## 2020-10-29 RX ADMIN — PHENYLEPHRINE HYDROCHLORIDE 300 MCG: 10 INJECTION INTRAVENOUS at 08:23

## 2020-10-29 RX ADMIN — PHENYLEPHRINE HYDROCHLORIDE 0.7 MCG/KG/MIN: 10 INJECTION INTRAVENOUS at 08:44

## 2020-10-29 RX ADMIN — FENTANYL CITRATE 25 MCG: 50 INJECTION, SOLUTION INTRAMUSCULAR; INTRAVENOUS at 10:02

## 2020-10-29 RX ADMIN — PROPOFOL 150 MG: 10 INJECTION, EMULSION INTRAVENOUS at 08:13

## 2020-10-29 RX ADMIN — FENTANYL CITRATE 25 MCG: 50 INJECTION, SOLUTION INTRAMUSCULAR; INTRAVENOUS at 10:11

## 2020-10-29 RX ADMIN — Medication 5 MG: at 08:11

## 2020-10-29 RX ADMIN — PHENYLEPHRINE HYDROCHLORIDE 200 MCG: 10 INJECTION INTRAVENOUS at 08:32

## 2020-10-29 ASSESSMENT — MIFFLIN-ST. JEOR: SCORE: 1181.88

## 2020-10-29 NOTE — PROGRESS NOTES
Went over discharge instructions with pt's wife via phone. All questions answered.   Start full liquids today. Advance to pureed diet tomorrow.

## 2020-10-29 NOTE — PROGRESS NOTES
Care Coordination Telephone Call  [facility]     Called patient and wife several times to assist with getting the feeding tube adaptors for the tube that was placed today.  Unfortunately they were sent home from procedure today without the adaptors but home health from [facility] will  and bring to their home today.     Patient and wife are aware.    I have asked the patient to call with any additional questions or concerns and have provided my contact information.    Sheila BOO, HNBC, STAR-T  RN Care Coordinator  [facility]  Ph: [phone]  FAX: [phone]

## 2020-10-29 NOTE — PROGRESS NOTES
[facility] Internal Procedure form:    Procedure Requested:   Procedure/Imaging/Clinic: EGD with esophageal stent exchange   Physician: Derick   Timin months (plan for 20)  Procedure length: 60 min   Anesthesia: Gen   Dx: esophageal stricture   Tier: 3   [facility]     Requested provider (if specified): Derick    Has patient been evaluated in clinic or had a procedure [facility] provider in the last 5 years: Yes this is two month follow up     OR orders place and patient will be contacted by OR scheduling.    Sheila Bennett RN   BSN, HNBC, STAR-T  [facility]  Care Coordinator  Ph: [phone]  FAX: [phone]

## 2020-10-29 NOTE — DISCHARGE INSTRUCTIONS
[facility]  Same-Day Surgery   Adult Discharge Orders & Instructions     For 24 hours after surgery    1. Get plenty of rest.  A responsible adult must stay with you for at least 24 hours after you leave the hospital.   2. Do not drive or use heavy equipment.  If you have weakness or tingling, don't drive or use heavy equipment until this feeling goes away.  3. Do not drink alcohol.  4. Avoid strenuous or risky activities.  Ask for help when climbing stairs.   5. You may feel lightheaded.  IF so, sit for a few minutes before standing.  Have someone help you get up.   6. If you have nausea (feel sick to your stomach): Drink only clear liquids such as apple juice, ginger ale, broth or 7-Up.  Rest may also help.  Be sure to drink enough fluids.  Move to a regular diet as you feel able.  7. You may have a slight fever. Call the doctor if your fever is over 100 F (37.7 C) (taken under the tongue) or lasts longer than 24 hours.  8. You may have a dry mouth, a sore throat, muscle aches or trouble sleeping.  These should go away after 24 hours.  9. Do not make important or legal decisions.   Call your doctor for any of the followin.  Signs of infection (fever, growing tenderness at the surgery site, a large amount of drainage or bleeding, severe pain, foul-smelling drainage, redness, swelling).    2. It has been over 8 to 10 hours since surgery and you are still not able to urinate (pass water).    3.  Headache for over 24 hours.    To contact a doctor, call Dr. Sepulveda's clinic at [phone] or:        [phone] and ask for the resident on call for GASTROENTEROLOGY (answered 24 hours a day)      Emergency Department:    [facility]: [phone]       (TTY for hearing impaired: [phone])    [facility]: [phone]       (TTY for hearing impaired: [phone])

## 2020-10-29 NOTE — ANESTHESIA POSTPROCEDURE EVALUATION
Anesthesia POST Procedure Evaluation    Patient: Pradeep Figueroa Jr.   MRN:     [id number] Gender:   male   Age:    75 year old :      1945        Preoperative Diagnosis: Esophageal stricture [K22.2]   Procedure(s):  ESOPHAGOGASTRODUODENOSCOPY (EGD)  with esophageal stent placement and suturing, exchange of peg-gastrostomy feeding tube   Postop Comments: No value filed.     Anesthesia Type: General       Disposition: Outpatient   Postop Pain Control: Uneventful            Sign Out: Well controlled pain   PONV: No   Neuro/Psych: Uneventful            Sign Out: Acceptable/Baseline neuro status   Airway/Respiratory: Uneventful            Sign Out: Acceptable/Baseline resp. status   CV/Hemodynamics: Uneventful            Sign Out: Acceptable CV status   Other NRE: NONE   DID A NON-ROUTINE EVENT OCCUR? No         Last Anesthesia Record Vitals:  CRNA VITALS  10/29/2020 0907 - 10/29/2020 0947      10/29/2020             Pulse:  75    SpO2:  100 %          Last PACU Vitals:  Vitals Value Taken Time   /79 10/29/20 0943   Temp     Pulse 73 10/29/20 0945   Resp     SpO2 100 % 10/29/20 0945   Temp src     NIBP     Pulse     SpO2     Resp     Temp     Ht Rate     Temp 2     Vitals shown include unvalidated device data.      Electronically Signed By: Feliz Dalton MD, 2020, 9:47 AM

## 2020-10-29 NOTE — BRIEF OP NOTE
[facility] Brief Operative Note    Pre-operative diagnosis: Esophageal stricture [K22.2]   Post-operative diagnosis Extrinsic esophageal stricture   Procedure: Procedure(s):  ESOPHAGOGASTRODUODENOSCOPY (EGD)  with esophageal stent placement and suturing, exchange of peg-gastrostomy feeding tube   Surgeon: Salazar Sepulveda MD   Assistants(s): Chris Cates MD   Estimated blood loss: None    Specimens: None       Findings:  - High-grade extrinsic distal esophageal stricture extending from the gastric cardia / GE junction to the proximal extent of the distal esophagus. Known etiology is adjacent pleural mesothelioma invading periesophageal adventitia.  - Deployment of esophageal 18-mm x 120-mm fully-covered self-expanding metal stent, across the extrinsic distal esophageal stricture.  - Proximal flange of esophageal FCSEMS anchored to esophageal mucosa via an endoscopic suture.  - Exchange of gastrostomy tubing via a 20-Fr AKHIL Gastrostomy Feeding Tube with ENFit Connectors (8100-20).    Recommendations:  - Observe in PACU and discharge.  - Full liquid diet today (10/29/20).  - Start puree oral diet tomorrow (10/30/20).  - Prescribed omeprazole 40 mg PO BID.  - Resume gastrostomy tube feedings.        Chris Cates MD on 10/29/2020 at 10:19 AM

## 2020-10-29 NOTE — PROGRESS NOTES
Received notification from Sheila Bennett RNCC that Yossi needs a refill of oxycodone and lorazepam.  A message was sent to  for the oxycodone and the lorazepam was called in to the [facility] pharmacy in [place].

## 2020-10-29 NOTE — ANESTHESIA CARE TRANSFER NOTE
Patient: Pradeep Figueroa Jr.    Procedure(s):  ESOPHAGOGASTRODUODENOSCOPY (EGD)  with esophageal stent placement and suturing, exchange of peg-gastrostomy feeding tube    Diagnosis: Esophageal stricture [K22.2]  Diagnosis Additional Information: No value filed.    Anesthesia Type:   No value filed.     Note:  Airway :Oral Airway  Patient transferred to:PACU  Handoff Report: Identifed the Patient, Identified the Reponsible Provider, Reviewed the pertinent medical history, Discussed the surgical course, Reviewed Intra-OP anesthesia mangement and issues during anesthesia, Set expectations for post-procedure period and Allowed opportunity for questions and acknowledgement of understanding      Vitals: (Last set prior to Anesthesia Care Transfer)    CRNA VITALS  10/29/2020 0907 - 10/29/2020 0942      10/29/2020             Pulse:  75    SpO2:  100 %          BP: 122/79  HR 70  SpO2 100%  RR 14  T:36.1    Electronically Signed By: Feliz Dalton MD  October 29, 2020  9:42 AM

## 2020-10-29 NOTE — OR NURSING
"Call placed to Dr. Dalton re: pt c/o midsternal/epigastric pressure described as \"moderate.\" Denies SOB or nausea. ECG WNL. VSS. Medications per MAR with improvement in pain symptoms.   "

## 2020-10-30 ENCOUNTER — PATIENT OUTREACH (OUTPATIENT)
Dept: GASTROENTEROLOGY | Facility: CLINIC | Age: 75
End: 2020-10-30

## 2020-10-30 LAB — UPPER GI ENDOSCOPY: NORMAL

## 2020-10-30 NOTE — PROGRESS NOTES
Care Coordination Telephone Call  [facility]     Called patient to make sure that everything has been cleared with the feeding tube and that all is working well.     I have asked the patient to call with any additional questions or concerns and have provided my contact information.    Sheila GASCAN, HNBC, STAR-T  RN Care Coordinator  [facility]  Ph: [phone]  FAX: [phone]

## 2020-11-02 ENCOUNTER — PATIENT OUTREACH (OUTPATIENT)
Dept: ONCOLOGY | Facility: CLINIC | Age: 75
End: 2020-11-02

## 2020-11-02 NOTE — PROGRESS NOTES
Spoke with Dony's wife who states he is in the hospital at [facility] currently.  He is on a morphine drip and not opening his eyes much.  She states that the stent isn't functioning well and he hasn't had anything to eat since Thursday.  He tried a little applesauce and it seemed to go ok.  Then he stared sleeping most of the day on Friday and Saturday.  She couldn't get him out of the chair and wasn't as responsive.  She called 911 and they took him to the [facility] where they found he had a moderate sized pneumothorax.  A chest tube was placed and then he was transferred to [facility].  Sheila Bennett RNCC from 's office has been notified and the physicians are in discussion about a plan.  She does have an appointment with  this wed.  He has also been notified of the event via a note Nataliia sent through [employer].      A lot of time was spent counseling Nataliia >50% of 40 min conversation.  She is experiencing a lot of stress and anxiety at this time.  Advised her to get a hold of the in patient  and discuss her concerns with them.  She agreed with and verbalized understanding of the plan of care.

## 2020-11-03 ENCOUNTER — PATIENT OUTREACH (OUTPATIENT)
Dept: GASTROENTEROLOGY | Facility: CLINIC | Age: 75
End: 2020-11-03

## 2020-11-03 NOTE — PROGRESS NOTES
Care Coordination Telephone Call  [facility]     Called by patient's wife to to sure that we are aware that he is in the hospital at [facility].  She wants to sure that the MD's have discussed his case.  Provided reassurance and asked her to call me when he is discharged.  She voiced understanding.    I have asked them to call with any additional questions or concerns and have provided my contact information.    Sheila BOO, HNBC, STAR-T  RN Care Coordinator  [facility]  Ph: [phone]  FAX: [phone]

## 2020-11-05 ENCOUNTER — HOSPITAL ENCOUNTER (OUTPATIENT)
Facility: CLINIC | Age: 75
End: 2020-11-05
Attending: INTERNAL MEDICINE | Admitting: INTERNAL MEDICINE
Payer: MEDICARE

## 2020-11-06 ENCOUNTER — PATIENT OUTREACH (OUTPATIENT)
Dept: GASTROENTEROLOGY | Facility: CLINIC | Age: 75
End: 2020-11-06

## 2020-11-06 NOTE — PROGRESS NOTES
Care Coordination Telephone Call  [facility]     Called by patient's wife to discuss refill of Prilosec liquid.  They will call ordering provider.     He was discharged from [facility] yesterday so they are wondering plan going forward.    I have asked the patient to call with any additional questions or concerns and have provided my contact information.    Plan:  Messages sent to Ora to discuss follow up    Sheila BOO, HNBC, STAR-T  RN Care Coordinator  [facility]  Ph: [phone]  FAX: [phone]

## 2020-11-06 NOTE — PROGRESS NOTES
Care Coordination Telephone Call  [facility]     Called patient's pharmacy to clarify order for Prilosec placed by Dr. Frazier that should be suspension of same dosage per. Dr. Sepulveda.    Sheila GASCAN, [facility], STAR-T  RN Care Coordinator  [facility]  Ph: [phone]  FAX: [phone]

## 2020-11-09 ENCOUNTER — VIRTUAL VISIT (OUTPATIENT)
Dept: ONCOLOGY | Facility: CLINIC | Age: 75
End: 2020-11-09
Attending: INTERNAL MEDICINE
Payer: MEDICARE

## 2020-11-09 DIAGNOSIS — C45.0 MESOTHELIOMA (PLEURAL) (H): Primary | ICD-10-CM

## 2020-11-09 PROCEDURE — 999N001193 HC VIDEO/TELEPHONE VISIT; NO CHARGE

## 2020-11-09 PROCEDURE — 99443 PR PHYSICIAN TELEPHONE EVALUATION 21-30 MIN: CPT | Mod: 95 | Performed by: INTERNAL MEDICINE

## 2020-11-09 RX ORDER — AMOXICILLIN AND CLAVULANATE POTASSIUM 400; 57 MG/5ML; MG/5ML
POWDER, FOR SUSPENSION ORAL
COMMUNITY
Start: 2020-11-05

## 2020-11-09 NOTE — LETTER
"    11/9/2020         RE: Pradeep Figueroa Jr.  [address] [place] [address]        Dear Colleague,    Thank you for referring your patient, Pradeep Figueroa Jr., to the [facility]. Please see a copy of my visit note below.    Pradeep Figueroa Jr. is a 75 year old male who is being evaluated via a billable video visit.      The patient has been notified of following:     \"This video visit will be conducted via a call between you and your physician/provider. We have found that certain health care needs can be provided without the need for an in-person physical exam.  This service lets us provide the care you need with a video conversation.  If a prescription is necessary we can send it directly to your pharmacy.  If lab work is needed we can place an order for that and you can then stop by our lab to have the test done at a later time.    Video visits are billed at different rates depending on your insurance coverage.  Please reach out to your insurance provider with any questions.    If during the course of the call the physician/provider feels a video visit is not appropriate, you will not be charged for this service.\"    Patient has given verbal consent for Video visit? Yes    How would you like to obtain your AVS? MyChart     If you are dropped from the video visit, the video invite should be resent to: Text to cell phone: [phone]     Will anyone else be joining your video visit? No         I have reviewed and updated the patient's allergies and pt confirmed any changes to their medication list.  Patient was asked to provide any patient recorded vital signs, height and/or weight.  Please see \"Patient Reported Vital Signs\" tab for that information.  Patient instructed to be in the virtual waiting room 10-15 minutes prior to appointment time.    There were no vitals taken for this visit.    Concerns: Patient has no new concerns.  Refills: None    Type of service:  " Telephone visit    Call start time:3:23 pm   Call stop time: 3:42 pm    Originating Location (pt. Location): Home  Distant Location (provider location):  [facility]   Platform used for Video Visit: Archie Bear MD    Phone visit consent insert: yes    TELEPHONE VISIT       CHIEF COMPLAINT:  Pleural mesothelioma.      Interval history:    Had stent placed on the 10/29, however this failed quickly, this was exchanged at [place] during a brief admission.  Has been able to drink apple juice and pureed fruit.      He is joined on the call by his wife katharina.  She reports he was doing well Saturday but seemed more tired Sunday and had a fall from sitting and seemed very weak.  He took a nap and still seemed weak and fatigued after.  She checked his temp and pulse. Which were normal.  Patient reports bilateral slight chest pain with inspiration. Worse on the left.  No leg swelling.  Tries to get up and around.  Uncomfortable lying down.  But spends most of the day dozing.      She called an ambulance this morning.  In the ER he was sating in the low 90s.  They put him on O2 and he seemed to be more alert with this but did not require at the time of discharge.  CXR and basic labs obtained.  No interventions.  He was discharged home with plan to follow up with oncology.      Otherwise reports that still having some loose stools, but much improved from prior.  Otherwise feels globally weak.      Onc history:   Presented 07/2019 with progressive dysphagia.  Evaluation, including a CT of the chest, abdomen and pelvis in 02/2020 showed a circumferential distal esophageal thickening with upstream dilation.  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        The patient started treatment with carboplatin, pemetrexed and bevacizumab in 03/2020.  In 06/2020, we switched to ipilimumab and nivolumab.  CT scan at 8/17/2020 showed stable disease.   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      Last seen in this clinic 10/14/2020 at which point plan was for repeat CT with this visit.  Following esophageal stent placement.      REVIEW OF SYSTEMS:  A 10 point review of system negative except as noted above.     LABORATORY:    Reviewed labs from OSH ER    CBC with WBC 13.5 (neutrophil predominant), hgb 11.9  BMP with sodium 132, creatinine 0.72    IMAGING:  No imaging was carried out today.   CT Chest:   DATE/TIME: 11/2/2020 11:13 AM    INDICATION: Pleural effusion, possible esophageal perforation - water-soluble  oral contrast  COMPARISON: 10/31/2020  TECHNIQUE: CT chest without IV contrast. Multiplanar reformats were obtained.  Dose reduction techniques were used.  CONTRAST: None.    FINDINGS:   LUNGS AND PLEURA: Small right effusion with atelectasis. Small left  hydropneumothorax, the pneumothorax component has decreased when compared to the  prior examination. Small chest tube noted at the anterior left lung base.    MEDIASTINUM/AXILLAE: Heart is normal in size. No adenopathy. A esophageal stent  appears within the mid to distal esophagus with the distal tip of the esophagus  within the stomach. The stent appears collapsed similar to the previous  examination. No evidence of contrast extravasation to suggest esophageal  perforation. The upper esophagus is distended with debris and air. The distal  esophagus appears to be thickened which is poorly evaluated on this examination.    UPPER ABDOMEN: Hepatic cysts. Left renal cysts. Otherwise visualized portions of  the upper abdomen within normal limits.    MUSCULOSKELETAL: Degenerative changes of the spine.    IMPRESSION:   1.  No evidence of esophageal perforation. The esophageal stent does appear to  be collapsed, similar to prior examination. The esophagus is distended with  debris and air making the patient at increased risk for aspiration.  2.  Small  "left hydropneumothorax with a small chest tube at the anterior left  lung base. This is decreased in size when compared to the prior examination.  Small right effusion with atelectasis.    PHYSICAL EXAMINATION:  Phone visit.    Normal mood and affect.  Speaking in complete sentences.       ASSESSMENT AND PLAN:  Pleural mesothelioma:  The patient has pleural mesothelioma with esophageal infiltration.  His esophagus is now stented open with some improvement in PO intake. However remains very weak and was in the ER this AM related to this.  Also a fall this weekend and getting quite sedentary.  Functional status does not seem appropriate for treatment at this time.  Will plan for short interval follow up with repeat imaging and next treatment steps will be decided at follow up visit.  - RTC 4-5 weeks with repeat CT scan  - Consider CKD4 directed therapy at that time pending functional status     Patient staffed with attending Dr. Breen     I spoke to this patient independently and in conjunction with Dr. Bear.  I advised patient that we may have upcoming studies with NF2 inhibitor and BAP1 targeted therapy.  Alternatively, we may try and obtain CDKN2a targeted therapy on a compassionate use basis.  In the meantime, however, he needs to gain strength and needs to obtain a PS of 1 or better.  I will give the patient 4 to 6 weeks and see him back with a CT CAP.      Pradeep Figueroa JrFaye is a 75 year old male who is being evaluated via a billable telephone visit.      The patient has been notified of following:     \"This telephone visit will be conducted via a call between you and your physician/provider. We have found that certain health care needs can be provided without the need for a physical exam.  This service lets us provide the care you need with a short phone conversation.  If a prescription is necessary we can send it directly to your pharmacy.  If lab work is needed we can place an order for that and you can " "then stop by our lab to have the test done at a later time.    Telephone visits are billed at different rates depending on your insurance coverage. During this emergency period, for some insurers they may be billed the same as an in-person visit.  Please reach out to your insurance provider with any questions.    If during the course of the call the physician/provider feels a telephone visit is not appropriate, you will not be charged for this service.\"    Patient has given verbal consent for Telephone visit?  Yes    What phone number would you like to be contacted at? [phone]    How would you like to obtain your AVS? Gerhardhardeny    Vitals - Patient Reported  Weight (Patient Reported): 51.3 kg (113 lb)  Pain Score: No Pain (0)          I have reviewed and updated patient's allergy and medication list.    Concerns: Plan going forward  Refills: None needed      Danielle Augustine CMA    Phone call duration: 30 minutes    Myles Breen MD          Again, thank you for allowing me to participate in the care of your patient.        Sincerely,        Myles Breen MD    "

## 2020-11-09 NOTE — PROGRESS NOTES
"Pradeep Figueroa Jr. is a 75 year old male who is being evaluated via a billable video visit.      The patient has been notified of following:     \"This video visit will be conducted via a call between you and your physician/provider. We have found that certain health care needs can be provided without the need for an in-person physical exam.  This service lets us provide the care you need with a video conversation.  If a prescription is necessary we can send it directly to your pharmacy.  If lab work is needed we can place an order for that and you can then stop by our lab to have the test done at a later time.    Video visits are billed at different rates depending on your insurance coverage.  Please reach out to your insurance provider with any questions.    If during the course of the call the physician/provider feels a video visit is not appropriate, you will not be charged for this service.\"    Patient has given verbal consent for Video visit? Yes    How would you like to obtain your AVS? MyChart     If you are dropped from the video visit, the video invite should be resent to: Text to cell phone: [phone]     Will anyone else be joining your video visit? No         I have reviewed and updated the patient's allergies and pt confirmed any changes to their medication list.  Patient was asked to provide any patient recorded vital signs, height and/or weight.  Please see \"Patient Reported Vital Signs\" tab for that information.  Patient instructed to be in the virtual waiting room 10-15 minutes prior to appointment time.    There were no vitals taken for this visit.    Concerns: Patient has no new concerns.  Refills: None    Type of service:  Telephone visit    Call start time:3:23 pm   Call stop time: 3:42 pm    Originating Location (pt. Location): Home  Distant Location (provider location):  [facility]   Platform used for Video Visit: Archie Bear MD    Phone visit " consent insert: yes    TELEPHONE VISIT       CHIEF COMPLAINT:  Pleural mesothelioma.      Interval history:    Had stent placed on the 10/29, however this failed quickly, this was exchanged at [place] during a brief admission.  Has been able to drink apple juice and pureed fruit.      He is joined on the call by his wife katharina.  She reports he was doing well Saturday but seemed more tired Sunday and had a fall from sitting and seemed very weak.  He took a nap and still seemed weak and fatigued after.  She checked his temp and pulse. Which were normal.  Patient reports bilateral slight chest pain with inspiration. Worse on the left.  No leg swelling.  Tries to get up and around.  Uncomfortable lying down.  But spends most of the day dozing.      She called an ambulance this morning.  In the ER he was sating in the low 90s.  They put him on O2 and he seemed to be more alert with this but did not require at the time of discharge.  CXR and basic labs obtained.  No interventions.  He was discharged home with plan to follow up with oncology.      Otherwise reports that still having some loose stools, but much improved from prior.  Otherwise feels globally weak.      Onc history:   Presented 07/2019 with progressive dysphagia.  Evaluation, including a CT of the chest, abdomen and pelvis in 02/2020 showed a circumferential distal esophageal thickening with upstream dilation.  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        The patient started treatment with carboplatin, pemetrexed and bevacizumab in 03/2020.  In 06/2020, we switched to ipilimumab and nivolumab.  CT scan at 8/17/2020 showed stable disease.   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      Last seen in this clinic 10/14/2020 at which point plan was for repeat CT with this visit.  Following esophageal stent placement.      REVIEW OF SYSTEMS:  A 10  point review of system negative except as noted above.     LABORATORY:    Reviewed labs from OSH ER    CBC with WBC 13.5 (neutrophil predominant), hgb 11.9  BMP with sodium 132, creatinine 0.72    IMAGING:  No imaging was carried out today.   CT Chest:   DATE/TIME: 11/2/2020 11:13 AM    INDICATION: Pleural effusion, possible esophageal perforation - water-soluble  oral contrast  COMPARISON: 10/31/2020  TECHNIQUE: CT chest without IV contrast. Multiplanar reformats were obtained.  Dose reduction techniques were used.  CONTRAST: None.    FINDINGS:   LUNGS AND PLEURA: Small right effusion with atelectasis. Small left  hydropneumothorax, the pneumothorax component has decreased when compared to the  prior examination. Small chest tube noted at the anterior left lung base.    MEDIASTINUM/AXILLAE: Heart is normal in size. No adenopathy. A esophageal stent  appears within the mid to distal esophagus with the distal tip of the esophagus  within the stomach. The stent appears collapsed similar to the previous  examination. No evidence of contrast extravasation to suggest esophageal  perforation. The upper esophagus is distended with debris and air. The distal  esophagus appears to be thickened which is poorly evaluated on this examination.    UPPER ABDOMEN: Hepatic cysts. Left renal cysts. Otherwise visualized portions of  the upper abdomen within normal limits.    MUSCULOSKELETAL: Degenerative changes of the spine.    IMPRESSION:   1.  No evidence of esophageal perforation. The esophageal stent does appear to  be collapsed, similar to prior examination. The esophagus is distended with  debris and air making the patient at increased risk for aspiration.  2.  Small left hydropneumothorax with a small chest tube at the anterior left  lung base. This is decreased in size when compared to the prior examination.  Small right effusion with atelectasis.    PHYSICAL EXAMINATION:  Phone visit.    Normal mood and affect.  Speaking in  complete sentences.       ASSESSMENT AND PLAN:  Pleural mesothelioma:  The patient has pleural mesothelioma with esophageal infiltration.  His esophagus is now stented open with some improvement in PO intake. However remains very weak and was in the ER this AM related to this.  Also a fall this weekend and getting quite sedentary.  Functional status does not seem appropriate for treatment at this time.  Will plan for short interval follow up with repeat imaging and next treatment steps will be decided at follow up visit.  - RTC 4-5 weeks with repeat CT scan  - Consider CKD4 directed therapy at that time pending functional status     Patient staffed with attending Dr. Breen     I spoke to this patient independently and in conjunction with Dr. Bear.  I advised patient that we may have upcoming studies with NF2 inhibitor and BAP1 targeted therapy.  Alternatively, we may try and obtain CDKN2a targeted therapy on a compassionate use basis.  In the meantime, however, he needs to gain strength and needs to obtain a PS of 1 or better.  I will give the patient 4 to 6 weeks and see him back with a CT CAP.

## 2020-11-09 NOTE — PROGRESS NOTES
"Pradeep Figueroa Jr. is a 75 year old male who is being evaluated via a billable telephone visit.      The patient has been notified of following:     \"This telephone visit will be conducted via a call between you and your physician/provider. We have found that certain health care needs can be provided without the need for a physical exam.  This service lets us provide the care you need with a short phone conversation.  If a prescription is necessary we can send it directly to your pharmacy.  If lab work is needed we can place an order for that and you can then stop by our lab to have the test done at a later time.    Telephone visits are billed at different rates depending on your insurance coverage. During this emergency period, for some insurers they may be billed the same as an in-person visit.  Please reach out to your insurance provider with any questions.    If during the course of the call the physician/provider feels a telephone visit is not appropriate, you will not be charged for this service.\"    Patient has given verbal consent for Telephone visit?  Yes    What phone number would you like to be contacted at? [phone]    How would you like to obtain your AVS? MyChart    Vitals - Patient Reported  Weight (Patient Reported): 51.3 kg (113 lb)  Pain Score: No Pain (0)          I have reviewed and updated patient's allergy and medication list.    Concerns: Plan going forward  Refills: None needed      Danielle Augustine CMA    Phone call duration: 30 minutes    Myles Breen MD      "

## 2020-11-16 DIAGNOSIS — Z11.59 ENCOUNTER FOR SCREENING FOR OTHER VIRAL DISEASES: Primary | ICD-10-CM

## 2020-11-18 ENCOUNTER — PATIENT OUTREACH (OUTPATIENT)
Dept: GASTROENTEROLOGY | Facility: CLINIC | Age: 75
End: 2020-11-18

## 2020-11-18 ENCOUNTER — PATIENT OUTREACH (OUTPATIENT)
Dept: ONCOLOGY | Facility: CLINIC | Age: 75
End: 2020-11-18

## 2020-11-18 DIAGNOSIS — C45.0 MESOTHELIOMA (PLEURAL) (H): Primary | ICD-10-CM

## 2020-11-18 NOTE — PROGRESS NOTES
"Care Coordination Telephone Call  [facility]     Priya called back to say that patient started having emesis yesterday at 4:30 pm, has had 4 and today large quantity.   He had a few sips of water/tea/juice over this time.  This am he had cranberry juice and then emesis.     Relates that he felt well for a couple of days after recent hospitalization, then very fatigued.  He was in ER at [place] 11/9 \"because they thought that his oxygen was low.\"  Sent home after that.     Shaking chills started yesterday, but chills have been for past 2-3 days.      He went to PCP yesterday, walked with walker and felt shaky after that.  Patient refused to have labs done and also chest xray.  PCP instructed them to increase fluids, start Amoxicillin and talk with oncology.     [facility] to come this afternoon.    Returned call to patient/wife to inform that patient will be scheduled for CT abdomen to check stent.  In Dr. Sepulveda's absence, Dr. Jeong will follow.     Instructed to go to ED if worsening fever, chills or pain for evaluation     I have asked the patient to call with any additional questions or concerns and have provided my contact information.    Sheila GASCAN, HNBC, STAR-T  RN Care Coordinator  [facility]  Ph: [phone]  FAX: [phone]          "

## 2020-11-18 NOTE — PROGRESS NOTES
Received and urgent message on vm from Nataliia stating that Yossi is starting to have the same symptoms as he did prior to his last stent collapse-shaking/trembling, throwing up and is hot and cold simulatouly.  Asked her how he is now and she states he is sleeping as she gave him some ativan.  Let her know that if he wakes up and the symptoms continue, he will need to go to the ER.  Advised her to bring him to the [facility] ER.  States he went to his PCP yesterday and he was not able to to get labs or a cxr.  He was started on oral amoxicillin.   has been notified.    Update:  Per , he should have a CXR PA and lateral at minimum, or a CT-CAP if he can tolerate it. Looking for effusion, or air in the abdomen (signs of perforation).  Called and spoke with Nataliia who states that Yossi woke up and he ate some eggs and applesauce.  He hasn't thrown up since this morning.  A request has been sent to scheduling for a CT-CAP at either  or the [facility].       Let her know that per :    I suspect the tumor is also compressing/collapsing the new stent or slipped. I spoke to Dr. Breen and his team earlier who I think will be reaching out. We need to get some imaging. Ideally another CT scan which they are arranging. I suspect we may just have to remove the stent as I am not sure we're making anything better with it in place. Dr. Breen's team might have already reach out about this. If he really isn't doing well he should come to our ER so we can sort it out.  She agreed with and verbalized understanding of the plan of care.

## 2020-11-18 NOTE — PROGRESS NOTES
Writer placed outgoing fax to [facility] for lab orders per the request of Henry Paez RN CC. Screen shot of fax confirmation below.

## 2020-11-19 ENCOUNTER — PATIENT OUTREACH (OUTPATIENT)
Dept: GASTROENTEROLOGY | Facility: CLINIC | Age: 75
End: 2020-11-19

## 2020-11-19 ENCOUNTER — HOSPITAL ENCOUNTER (OUTPATIENT)
Dept: CT IMAGING | Facility: CLINIC | Age: 75
Discharge: HOME | End: 2020-11-19
Attending: INTERNAL MEDICINE | Admitting: INTERNAL MEDICINE
Payer: MEDICARE

## 2020-11-19 DIAGNOSIS — C45.0 MESOTHELIOMA (PLEURAL) (H): ICD-10-CM

## 2020-11-19 PROCEDURE — 250N000011 HC RX IP 250 OP 636: Performed by: INTERNAL MEDICINE

## 2020-11-19 PROCEDURE — 71260 CT THORAX DX C+: CPT | Mod: 26 | Performed by: RADIOLOGY

## 2020-11-19 PROCEDURE — 250N000009 HC RX 250: Performed by: INTERNAL MEDICINE

## 2020-11-19 PROCEDURE — 74177 CT ABD & PELVIS W/CONTRAST: CPT

## 2020-11-19 PROCEDURE — 74177 CT ABD & PELVIS W/CONTRAST: CPT | Mod: 26 | Performed by: RADIOLOGY

## 2020-11-19 RX ORDER — HEPARIN SODIUM,PORCINE 10 UNIT/ML
5 VIAL (ML) INTRAVENOUS ONCE
Status: DISCONTINUED | OUTPATIENT
Start: 2020-11-19 | End: 2020-11-20 | Stop reason: HOSPADM

## 2020-11-19 RX ORDER — HEPARIN SODIUM (PORCINE) LOCK FLUSH IV SOLN 100 UNIT/ML 100 UNIT/ML
5 SOLUTION INTRAVENOUS ONCE
Status: COMPLETED | OUTPATIENT
Start: 2020-11-19 | End: 2020-11-19

## 2020-11-19 RX ORDER — IOPAMIDOL 755 MG/ML
100 INJECTION, SOLUTION INTRAVASCULAR ONCE
Status: COMPLETED | OUTPATIENT
Start: 2020-11-19 | End: 2020-11-19

## 2020-11-19 RX ADMIN — HEPARIN 5 ML: 100 SYRINGE at 09:49

## 2020-11-19 RX ADMIN — SODIUM CHLORIDE 54 ML: 9 INJECTION, SOLUTION INTRAVENOUS at 09:45

## 2020-11-19 RX ADMIN — IOPAMIDOL 57 ML: 755 INJECTION, SOLUTION INTRAVENOUS at 09:44

## 2020-11-19 NOTE — PROGRESS NOTES
Care Coordination Telephone Call  [facility]     Called by patient's wife to discuss Yossi's wishes.  She will talk with Dr. Breen about this also and has placed a call to him.     They wonder if/why a new stent would be placed and want to discuss this with the team.  They would like to discuss life expectancy and plan for going forward.  Yossi doesn't want to keep going in and out of hospital and wants to be comfortable.     I have asked them to call with any additional questions or concerns and have provided my contact information.    Sheila GASCAN, HNBC, STAR-T  RN Care Coordinator  [facility]  Ph: [phone]  FAX: [phone]

## 2020-11-20 ENCOUNTER — PATIENT OUTREACH (OUTPATIENT)
Dept: GASTROENTEROLOGY | Facility: CLINIC | Age: 75
End: 2020-11-20

## 2020-11-20 NOTE — PROGRESS NOTES
"Care Coordination Telephone Call  [facility]     Called by patient's wife to let us know that \"he is feeling better this am\"  Denies fever.    I have instructed them to call GI fellow on call over the weekend if changes prior to coming to ED if needed due to covid.  They have the contact #.    I have asked the patient to call with any additional questions or concerns and have provided my contact information.    Plan:  Confirmed virtual visit for Monday with Dr. Breen.    Sheila GASCAN, HNBC, STAR-T  RN Care Coordinator  [facility]  Ph: [phone]  FAX: [phone]          "

## 2020-11-23 ENCOUNTER — VIRTUAL VISIT (OUTPATIENT)
Dept: ONCOLOGY | Facility: CLINIC | Age: 75
End: 2020-11-23
Attending: INTERNAL MEDICINE
Payer: MEDICARE

## 2020-11-23 DIAGNOSIS — C45.0 MESOTHELIOMA (PLEURAL) (H): Primary | ICD-10-CM

## 2020-11-23 PROCEDURE — 999N001193 HC VIDEO/TELEPHONE VISIT; NO CHARGE

## 2020-11-23 PROCEDURE — 99443 PR PHYSICIAN TELEPHONE EVALUATION 21-30 MIN: CPT | Mod: 95 | Performed by: INTERNAL MEDICINE

## 2020-11-23 NOTE — LETTER
"    11/23/2020         RE: Pradeep Figueroa Jr.  [address] [place] [address]        Dear Colleague,    Thank you for referring your patient, Pradeep Figueroa Jr., to the [facility]. Please see a copy of my visit note below.    Pradeep Figueroa Jr. is a 75 year old male who is being evaluated via a billable telephone visit.      The patient has been notified of following:     \"This telephone visit will be conducted via a call between you and your physician/provider. We have found that certain health care needs can be provided without the need for a physical exam.  This service lets us provide the care you need with a short phone conversation.  If a prescription is necessary we can send it directly to your pharmacy.  If lab work is needed we can place an order for that and you can then stop by our lab to have the test done at a later time.    Telephone visits are billed at different rates depending on your insurance coverage. During this emergency period, for some insurers they may be billed the same as an in-person visit.  Please reach out to your insurance provider with any questions.    If during the course of the call the physician/provider feels a telephone visit is not appropriate, you will not be charged for this service.\"    Patient has given verbal consent for Telephone visit?  Yes    What phone number would you like to be contacted at? [phone]    How would you like to obtain your AVS? MyChart     I have reviewed and updated the patient's allergies and medication list.    Concerns: \"several questions to discuss\"  Refills: Needs Tylenol refilled    Vitals - Patient Reported  Weight (Patient Reported): 51.3 kg (113 lb)  Height (Patient Reported): 165.1 cm (5' 5\")  BMI (Based on Pt Reported Ht/Wt): 18.8  Pain Score: No Pain (0)        Shanna Connell CMA        Phone call duration: 25 minutes    Myles Breen MD        Pradeep Figueroa Jr. is a 75 year old " "male who is being evaluated via a billable telephone visit.      \"This telephone visit will be conducted via a call between you and your physician/provider. We have found that certain health care needs can be provided without the need for a physical exam.  This service lets us provide the care you need with a short phone conversation.  If a prescription is necessary we can send it directly to your pharmacy.  If lab work is needed we can place an order for that and you can then stop by our lab to have the test done at a later time.     Telephone visits are billed at different rates depending on your insurance coverage. During this emergency period, for some insurers they may be billed the same as an in-person visit.  Please reach out to your insurance provider with any questions.     If during the course of the call the physician/provider feels a telephone visit is not appropriate, you will not be charged for this service.\"     Patient has given verbal consent for Telephone visit?  Yes     What phone number would you like to be contacted at? [phone]     How would you like to obtain your AVS? Jenaro      I have reviewed and updated the patient's allergies and medication list.     Concerns: \"several questions to discuss\"  Refills: Needs Tylenol refilled     Vitals - Patient Reported  Weight (Patient Reported): 51.3 kg (113 lb)  Height (Patient Reported): 165.1 cm (5' 5\")  BMI (Based on Pt Reported Ht/Wt): 18.8  Pain Score: No Pain (0)    Call start time: 3:34 pm  Call stop time: 4:10 pm    Originating Location (pt. Location): Home  Distant Location (provider location):  [facility]     Phone visit consent insert: yes    TELEPHONE VISIT       CHIEF COMPLAINT:  Pleural mesothelioma.      Interval history:    Reports that his stamina is really deteriorating for the last 2 weeks.  He is unable to walk without help and is also having some incontinence of urine.  He is mostly bedbound but is " "getting up to go to the bathroom.  He is walking around just a little.  Reports that he is getting 24-hour nutrition through his PEG tube, but he is really reluctant to eat a lot due to nausea.  No bloating and food tastes ok.  He is not having vomiting or food sticking.  This really started after the last hospitalization--he returned home and wasn't able to walk or move around easily.    His wife Nataliia notes that he seemed \"really out of it\" after getting lorazepam for mild nausea.    Onc history:   Presented 07/2019 with progressive dysphagia.  Evaluation, including a CT of the chest, abdomen and pelvis in 02/2020 showed a circumferential distal esophageal thickening with upstream dilation.  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        The patient started treatment with carboplatin, pemetrexed and bevacizumab in 03/2020.  In 06/2020, we switched to ipilimumab and nivolumab.  CT scan at 8/17/2020 showed stable disease.   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      Now has had esophageal stent placed.    REVIEW OF SYSTEMS:  A 10 point review of system negative except as noted above.     LABORATORY:    No new labs    IMAGING:    No new imaging    PHYSICAL EXAMINATION:  Phone visit.    Normal mood and affect.  Speaking in complete sentences.       ASSESSMENT AND PLAN:    Pleural mesothelioma with esophageal infiltration    His esophagus is now stented open with some improvement in PO intake but unfortunately he is really having failure to thrive at this point.  We discussed future options, including stopping active cancer treatment.  At this point, chemotherapy such as navelbine would likely not be tolerated.  CDK4/6 inhibitor might be available on a compassionate use basis if he has a mutation for this on Guardant 360.  This is typically well tolerated and we could consider it if he wants to pursue " active treatment if he is doing better, but again we would want his functional status to improve prior to starting this.      He is not sure he wants to continue treatment but is not in a rush to make a decision.  Expresses motivation to get stronger.    Recommend avoiding lorazepam for now unless right before bed.  He has Zofran and will take that for nausea.    Encouraged physical therapy, which he has through [facility].    -- Return in December 2020 as scheduled, with labs including Guardant 360 and CT chest/abd/pelvis   -- Keep December appointment with Dr. Sepulveda    Patient staffed with Dr. Breen.     Sydney Hills MD  Hematology-Oncology Fellow    I directly interviewed this patient and participated in the encounter.  I agree with the assessment and plan.  We encouraged the patient to improve his ECOG PS.  I will see the patient back in December.      Again, thank you for allowing me to participate in the care of your patient.        Sincerely,        Myles Breen MD

## 2020-11-23 NOTE — PROGRESS NOTES
"Pradeep Figueroa Jr. is a 75 year old male who is being evaluated via a billable telephone visit.      \"This telephone visit will be conducted via a call between you and your physician/provider. We have found that certain health care needs can be provided without the need for a physical exam.  This service lets us provide the care you need with a short phone conversation.  If a prescription is necessary we can send it directly to your pharmacy.  If lab work is needed we can place an order for that and you can then stop by our lab to have the test done at a later time.     Telephone visits are billed at different rates depending on your insurance coverage. During this emergency period, for some insurers they may be billed the same as an in-person visit.  Please reach out to your insurance provider with any questions.     If during the course of the call the physician/provider feels a telephone visit is not appropriate, you will not be charged for this service.\"     Patient has given verbal consent for Telephone visit?  Yes     What phone number would you like to be contacted at? [phone]     How would you like to obtain your AVS? Jenaro      I have reviewed and updated the patient's allergies and medication list.     Concerns: \"several questions to discuss\"  Refills: Needs Tylenol refilled     Vitals - Patient Reported  Weight (Patient Reported): 51.3 kg (113 lb)  Height (Patient Reported): 165.1 cm (5' 5\")  BMI (Based on Pt Reported Ht/Wt): 18.8  Pain Score: No Pain (0)    Call start time: 3:34 pm  Call stop time: 4:10 pm    Originating Location (pt. Location): Home  Distant Location (provider location):  [facility]     Phone visit consent insert: yes    TELEPHONE VISIT       CHIEF COMPLAINT:  Pleural mesothelioma.      Interval history:    Reports that his stamina is really deteriorating for the last 2 weeks.  He is unable to walk without help and is also having some incontinence of " "urine.  He is mostly bedbound but is getting up to go to the bathroom.  He is walking around just a little.  Reports that he is getting 24-hour nutrition through his PEG tube, but he is really reluctant to eat a lot due to nausea.  No bloating and food tastes ok.  He is not having vomiting or food sticking.  This really started after the last hospitalization--he returned home and wasn't able to walk or move around easily.    His wife Nataliia notes that he seemed \"really out of it\" after getting lorazepam for mild nausea.    Onc history:   Presented 07/2019 with progressive dysphagia.  Evaluation, including a CT of the chest, abdomen and pelvis in 02/2020 showed a circumferential distal esophageal thickening with upstream dilation.  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        The patient started treatment with carboplatin, pemetrexed and bevacizumab in 03/2020.  In 06/2020, we switched to ipilimumab and nivolumab.  CT scan at 8/17/2020 showed stable disease.   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      Now has had esophageal stent placed.    REVIEW OF SYSTEMS:  A 10 point review of system negative except as noted above.     LABORATORY:    No new labs    IMAGING:    No new imaging    PHYSICAL EXAMINATION:  Phone visit.    Normal mood and affect.  Speaking in complete sentences.       ASSESSMENT AND PLAN:    Pleural mesothelioma with esophageal infiltration    His esophagus is now stented open with some improvement in PO intake but unfortunately he is really having failure to thrive at this point.  We discussed future options, including stopping active cancer treatment.  At this point, chemotherapy such as navelbine would likely not be tolerated.  CDK4/6 inhibitor might be available on a compassionate use basis if he has a mutation for this on Guardant 360.  This is typically well tolerated and we " could consider it if he wants to pursue active treatment if he is doing better, but again we would want his functional status to improve prior to starting this.      He is not sure he wants to continue treatment but is not in a rush to make a decision.  Expresses motivation to get stronger.    Recommend avoiding lorazepam for now unless right before bed.  He has Zofran and will take that for nausea.    Encouraged physical therapy, which he has through [facility].    -- Return in December 2020 as scheduled, with labs including Guardant 360 and CT chest/abd/pelvis   -- Keep December appointment with Dr. Sepulveda    Patient staffed with Dr. Breen.     Sydney Hills MD  Hematology-Oncology Fellow    I directly interviewed this patient and participated in the encounter.  I agree with the assessment and plan.  We encouraged the patient to improve his ECOG PS.  I will see the patient back in December.

## 2020-11-23 NOTE — PROGRESS NOTES
"Pradeep Figueroa Jr. is a 75 year old male who is being evaluated via a billable telephone visit.      The patient has been notified of following:     \"This telephone visit will be conducted via a call between you and your physician/provider. We have found that certain health care needs can be provided without the need for a physical exam.  This service lets us provide the care you need with a short phone conversation.  If a prescription is necessary we can send it directly to your pharmacy.  If lab work is needed we can place an order for that and you can then stop by our lab to have the test done at a later time.    Telephone visits are billed at different rates depending on your insurance coverage. During this emergency period, for some insurers they may be billed the same as an in-person visit.  Please reach out to your insurance provider with any questions.    If during the course of the call the physician/provider feels a telephone visit is not appropriate, you will not be charged for this service.\"    Patient has given verbal consent for Telephone visit?  Yes    What phone number would you like to be contacted at? [phone]    How would you like to obtain your AVS? Jenaro     I have reviewed and updated the patient's allergies and medication list.    Concerns: \"several questions to discuss\"  Refills: Needs Tylenol refilled    Vitals - Patient Reported  Weight (Patient Reported): 51.3 kg (113 lb)  Height (Patient Reported): 165.1 cm (5' 5\")  BMI (Based on Pt Reported Ht/Wt): 18.8  Pain Score: No Pain (0)        Shanna Connell CMA        Phone call duration: 25 minutes    Myles Breen MD      "

## 2020-11-24 DIAGNOSIS — R11.0 NAUSEA: ICD-10-CM

## 2020-11-24 DIAGNOSIS — C45.0 MESOTHELIOMA (PLEURAL) (H): Primary | ICD-10-CM

## 2020-11-24 RX ORDER — PROCHLORPERAZINE MALEATE 10 MG
10 TABLET ORAL EVERY 6 HOURS PRN
Qty: 120 TABLET | Refills: 1 | Status: SHIPPED | OUTPATIENT
Start: 2020-11-24 | End: 2020-11-24

## 2020-11-24 RX ORDER — PROCHLORPERAZINE MALEATE 10 MG
10 TABLET ORAL EVERY 6 HOURS PRN
Qty: 120 TABLET | Refills: 1 | Status: SHIPPED | OUTPATIENT
Start: 2020-11-24

## 2020-11-24 NOTE — TELEPHONE ENCOUNTER
Called patient to discuss stent placement. Per Dr. Jeong  I looked at the films, it may be that the distal end of the stent falls a bit short of bridging the mass   An upper endoscopy would confirm this and allow placement of another stent within this one or (2) show that its fine and we can remove the stent

## 2020-11-25 DIAGNOSIS — C45.0 MESOTHELIOMA (PLEURAL) (H): Primary | ICD-10-CM

## 2020-11-25 DIAGNOSIS — R11.0 NAUSEA: ICD-10-CM

## 2020-11-25 RX ORDER — ONDANSETRON HYDROCHLORIDE 4 MG/5ML
8 SOLUTION ORAL 2 TIMES DAILY PRN
Qty: 600 ML | Refills: 1 | Status: SHIPPED | OUTPATIENT
Start: 2020-11-25

## 2020-11-27 ENCOUNTER — TELEPHONE (OUTPATIENT)
Dept: ONCOLOGY | Facility: CLINIC | Age: 75
End: 2020-11-27

## 2020-11-27 NOTE — TELEPHONE ENCOUNTER
Prior Authorization Retail Medication Request    Medication/Dose: Ondansetron HCl 4mg/5ml solution  ICD code (if different than what is on RX):    Previously Tried and Failed:    Rationale:  Nausea    Insurance Name:  Medicare  Insurance ID:  [id number]      Pharmacy Information (if different than what is on RX)  Name:  [facility]  Phone:  [phone]

## 2020-11-27 NOTE — TELEPHONE ENCOUNTER
Central Prior Authorization Team   Phone: [phone]      PA Initiation    Medication: Ondansetron HCl 4mg/5ml solution-PA initiated  Insurance Company: [employer] ([facility]) - Phone [phone] Fax [phone]  Pharmacy Filling the Rx: [id number] [place], [place] - [address]  Filling Pharmacy Phone: [phone]  Filling Pharmacy Fax:    Start Date: 11/27/2020

## 2020-11-28 DIAGNOSIS — C45.0 MESOTHELIOMA (PLEURAL) (H): ICD-10-CM

## 2020-11-30 ENCOUNTER — PATIENT OUTREACH (OUTPATIENT)
Dept: GASTROENTEROLOGY | Facility: CLINIC | Age: 75
End: 2020-11-30

## 2020-11-30 RX ORDER — FOLIC ACID 1 MG/1
TABLET ORAL
Qty: 30 TABLET | Refills: 0 | Status: SHIPPED | OUTPATIENT
Start: 2020-11-30

## 2020-11-30 NOTE — TELEPHONE ENCOUNTER
Prior Authorization Approval    Authorization Effective Date: 11/27/2020  Authorization Expiration Date: 11/27/2021  Medication: Ondansetron HCl 4mg/5ml solution-PA approved  Approved Dose/Quantity:   Reference #: [id number]   Insurance Company: [employer] ([facility]) - Phone [phone] Fax [phone]  Expected CoPay:       CoPay Card Available:      Foundation Assistance Needed:    Which Pharmacy is filling the prescription (Not needed for infusion/clinic administered): [id number] - [place], [place] - [facility] [address]  Pharmacy Notified: Yes  Patient Notified: No

## 2020-11-30 NOTE — PROGRESS NOTES
Care Coordination Telephone Call  [facility]     Called by patient's wife to ask that we cancel any upcoming procedures with Dr. Sepulveda and any appointments.  They are working with Palliative care at this time and not planning for anything else.    I have asked them to call with any additional questions or concerns and have provided my contact information.    Sheila GASCAN, HNBC, STAR-T  RN Care Coordinator  [facility]  Ph: [phone]  FAX: [phone]

## 2020-12-02 ENCOUNTER — TELEPHONE (OUTPATIENT)
Dept: ONCOLOGY | Facility: CLINIC | Age: 75
End: 2020-12-02

## 2020-12-02 NOTE — TELEPHONE ENCOUNTER
Writer received call from Home Health RN from [facility] who states that Yossi and his family have decided to pursue hospice and would like referral placed. Once placed, referral needs to be faxed to [facility] at [phone]. Routing to care team for referral order and will fax once placed and signed.

## 2020-12-03 ENCOUNTER — PATIENT OUTREACH (OUTPATIENT)
Dept: ONCOLOGY | Facility: CLINIC | Age: 75
End: 2020-12-03

## 2020-12-03 DIAGNOSIS — C45.0 MESOTHELIOMA (PLEURAL) (H): Primary | ICD-10-CM

## 2020-12-03 RX ORDER — METHYLPHENIDATE HYDROCHLORIDE 5 MG/1
5 TABLET ORAL 2 TIMES DAILY
Qty: 30 TABLET | Refills: 0 | Status: SHIPPED | OUTPATIENT
Start: 2020-12-03

## 2020-12-03 NOTE — TELEPHONE ENCOUNTER
Spouse called to request Rx for Ritalin, stated she had spoken about this with care team before. Pt is set up to meet hospice tomorrow but she wonders if he can get started on something before he signs up. Teed up to provider for approval.

## 2020-12-03 NOTE — PROGRESS NOTES
Writer placed outgoing fax to [facility] for hospice referral per the request of ANG Franklin. Screen shot of fax confirmation below.

## 2020-12-03 NOTE — PROGRESS NOTES
Writer placed outgoing fax to [facility] for referral, facesheet, and clinic visit note per the request of ANG Franklin. Screen shot of fax confirmation below.

## 2020-12-04 ENCOUNTER — TELEPHONE (OUTPATIENT)
Dept: ONCOLOGY | Facility: CLINIC | Age: 75
End: 2020-12-04

## 2020-12-04 NOTE — TELEPHONE ENCOUNTER
Will need to call insurance, I can see he has coverage, however [employer] can't find coverage.

## 2020-12-07 NOTE — TELEPHONE ENCOUNTER
Prior Authorization Approval    Authorization Effective Date: 12/7/2020  Authorization Expiration Date: 12/7/2021  Medication: Methylphenidate HCl 5mg-PA approved  Approved Dose/Quantity:  Reference #: [id number]   Insurance Company: [employer] ([facility]) - Phone [phone] Fax [phone]  Expected CoPay:       CoPay Card Available:      Foundation Assistance Needed:    Which Pharmacy is filling the prescription (Not needed for infusion/clinic administered): [id number] - [place] [id number]  Pharmacy Notified: Yes  Patient Notified: No-Pharmacy will contact

## 2020-12-07 NOTE — TELEPHONE ENCOUNTER
Central Prior Authorization Team   Phone: [phone]      PA Initiation    Medication: Methylphenidate HCl 5mg-PA initiated  Insurance Company: [employer] ([facility]) - Phone [phone] Fax [phone]  Pharmacy Filling the Rx: [id number] [place], [place] - [address]  Filling Pharmacy Phone: [phone]  Filling Pharmacy Fax:    Start Date: 12/7/2020

## 2021-04-25 ENCOUNTER — HEALTH MAINTENANCE LETTER (OUTPATIENT)
Age: 76
End: 2021-04-25

## 2021-10-10 ENCOUNTER — HEALTH MAINTENANCE LETTER (OUTPATIENT)
Age: 76
End: 2021-10-10

## 2022-05-22 ENCOUNTER — HEALTH MAINTENANCE LETTER (OUTPATIENT)
Age: 77
End: 2022-05-22

## 2022-09-18 ENCOUNTER — HEALTH MAINTENANCE LETTER (OUTPATIENT)
Age: 77
End: 2022-09-18

## 2023-06-04 ENCOUNTER — HEALTH MAINTENANCE LETTER (OUTPATIENT)
Age: 78
End: 2023-06-04

## 2025-05-06 NOTE — PROGRESS NOTES
"Pradeep Figueroa Jr. is a 74 year old male who is being evaluated via a billable video visit.      The patient has been notified of following:     \"This video visit will be conducted via a call between you and your physician/provider. We have found that certain health care needs can be provided without the need for an in-person physical exam.  This service lets us provide the care you need with a video conversation.  If a prescription is necessary we can send it directly to your pharmacy.  If lab work is needed we can place an order for that and you can then stop by our lab to have the test done at a later time.    Video visits are billed at different rates depending on your insurance coverage.  Please reach out to your insurance provider with any questions.    If during the course of the call the physician/provider feels a video visit is not appropriate, you will not be charged for this service.\"    Patient has given verbal consent for Video visit? Yes    How would you like to obtain your AVS? Jenaro    Patient would like the video invitation sent by: Text to cell phone: [phone]    Will anyone else be joining your video visit? No        Video-Visit Details    See provider visit for details        " no concerns

## (undated) DEVICE — PACK ENDOSCOPY GI CUSTOM UMMC

## (undated) DEVICE — WIRE GUIDE 0.025"X450CM ANG VISIGLIDE G-240-2545A

## (undated) DEVICE — GLOVE PROTEXIS W/NEU-THERA 7.0  2D73TE70

## (undated) DEVICE — DRAPE BREAST/CHEST 29420

## (undated) DEVICE — ENDO FORCEP BX CAPTURA PRO SPIKE G50696

## (undated) DEVICE — ENDO TUBING CO2 SMARTCAP STERILE DISP 100145CO2EXT

## (undated) DEVICE — DRAPE C-ARM 60X42" 1013

## (undated) DEVICE — ENDO BITE BLOCK ADULT OMNI-BLOC

## (undated) DEVICE — ENDO SYSTEM WATER BOTTLE & TUBING W/CO2 FILTER 00711549

## (undated) DEVICE — ADH SKIN CLOSURE PREMIERPRO EXOFIN 1.0ML 3470

## (undated) DEVICE — GLOVE PROTEXIS BLUE W/NEU-THERA 7.5  2D73EB75

## (undated) DEVICE — PAD CHUX UNDERPAD 23X24" 7136

## (undated) DEVICE — KIT ENDO FIRST STEP DISINFECTANT 200ML W/POUCH EP-4

## (undated) DEVICE — KIT CONNECTOR FOR OLYMPUS ENDOSCOPES DEFENDO 100310

## (undated) DEVICE — SU OVERSTITCH 2-0 POLYPROPYLENE APOLLO PLY-G02-020-APL

## (undated) DEVICE — ESU GROUND PAD ADULT W/CORD E7507

## (undated) DEVICE — TUBE GASTROSTOMY PONSKY PULL DELUXE 20FR 000792

## (undated) DEVICE — PACK MINOR SBA15MIFSE

## (undated) DEVICE — Device

## (undated) DEVICE — SU VICRYL 4-0 PS-2 18" UND J496H

## (undated) DEVICE — SOL WATER IRRIG 1000ML BOTTLE 07139-09

## (undated) DEVICE — SOL WATER IRRIG 1000ML BOTTLE 2F7114

## (undated) DEVICE — ENDO CAP AND TUBING STERILE FOR ENDOGATOR  100130

## (undated) DEVICE — PREP CHLORAPREP 26ML TINTED ORANGE  260815

## (undated) DEVICE — ENDO DEVICE LOCKING AND BIOPSY CAP M00545261

## (undated) DEVICE — SPECIMEN CONTAINER 3OZ W/FORMALIN 59901

## (undated) DEVICE — DRSG DRAIN 2X2" 7087

## (undated) DEVICE — ENDO SNARE POLYPECTOMY PEDIATRIC WIDE OVAL 27MM LOOP

## (undated) DEVICE — SU VICRYL 2-0 SH 27" UND J417H

## (undated) DEVICE — CATH RETRIEVAL BALLOON EXTRACTOR PRO RX-S INJ ABOVE 9-12MM

## (undated) DEVICE — LINEN TOWEL PACK X5 5464

## (undated) DEVICE — SU VICRYL 3-0 SH 27" J316H

## (undated) DEVICE — ENDO SYSTEM SU CINCH OVERSTITCH APOLLO ESS-G02-160

## (undated) DEVICE — TUBING SUCTION 10'X3/16" N510

## (undated) DEVICE — TUBE GASTROSTOMY MIC ENFIT 20FR 8100-20

## (undated) DEVICE — ENDO OVERTUBE APOLLO 10-16X27CM OVT-027-160

## (undated) DEVICE — BALLOON ULTRAMATRIX FOR OLYMPUS EUS LINEAR LATEX FREE USB-OL

## (undated) DEVICE — SU CINCH OVERSTITCH SINGLE USE APOLLO CNH-G01-000

## (undated) DEVICE — SUCTION MANIFOLD DORNOCH ULTRA CART UL-CL500

## (undated) DEVICE — ENDO NDL ASPIRATION ULTRASOUND 22GA ACQUIRE M00555540

## (undated) RX ORDER — FENTANYL CITRATE 50 UG/ML
INJECTION, SOLUTION INTRAMUSCULAR; INTRAVENOUS
Status: DISPENSED
Start: 2020-03-31

## (undated) RX ORDER — LIDOCAINE HYDROCHLORIDE 20 MG/ML
INJECTION, SOLUTION EPIDURAL; INFILTRATION; INTRACAUDAL; PERINEURAL
Status: DISPENSED
Start: 2020-03-31

## (undated) RX ORDER — HEPARIN SODIUM,PORCINE 10 UNIT/ML
VIAL (ML) INTRAVENOUS
Status: DISPENSED
Start: 2020-03-31

## (undated) RX ORDER — INDOMETHACIN 50 MG/1
SUPPOSITORY RECTAL
Status: DISPENSED
Start: 2020-10-29

## (undated) RX ORDER — ACETAMINOPHEN 325 MG/1
TABLET ORAL
Status: DISPENSED
Start: 2020-03-17

## (undated) RX ORDER — HEPARIN SODIUM 5000 [USP'U]/ML
INJECTION, SOLUTION INTRAVENOUS; SUBCUTANEOUS
Status: DISPENSED
Start: 2020-03-17

## (undated) RX ORDER — SIMETHICONE 40MG/0.6ML
SUSPENSION, DROPS(FINAL DOSAGE FORM)(ML) ORAL
Status: DISPENSED
Start: 2020-10-29

## (undated) RX ORDER — FENTANYL CITRATE 50 UG/ML
INJECTION, SOLUTION INTRAMUSCULAR; INTRAVENOUS
Status: DISPENSED
Start: 2020-02-20

## (undated) RX ORDER — HEPARIN SODIUM 1000 [USP'U]/ML
INJECTION, SOLUTION INTRAVENOUS; SUBCUTANEOUS
Status: DISPENSED
Start: 2020-03-17

## (undated) RX ORDER — CEFAZOLIN SODIUM 2 G/100ML
INJECTION, SOLUTION INTRAVENOUS
Status: DISPENSED
Start: 2020-03-17

## (undated) RX ORDER — KETOROLAC TROMETHAMINE 30 MG/ML
INJECTION, SOLUTION INTRAMUSCULAR; INTRAVENOUS
Status: DISPENSED
Start: 2020-03-17

## (undated) RX ORDER — EPHEDRINE SULFATE 50 MG/ML
INJECTION, SOLUTION INTRAMUSCULAR; INTRAVENOUS; SUBCUTANEOUS
Status: DISPENSED
Start: 2020-10-29

## (undated) RX ORDER — FENTANYL CITRATE 50 UG/ML
INJECTION, SOLUTION INTRAMUSCULAR; INTRAVENOUS
Status: DISPENSED
Start: 2020-10-29

## (undated) RX ORDER — IOPAMIDOL 510 MG/ML
INJECTION, SOLUTION INTRAVASCULAR
Status: DISPENSED
Start: 2020-10-29

## (undated) RX ORDER — IPRATROPIUM BROMIDE AND ALBUTEROL SULFATE 2.5; .5 MG/3ML; MG/3ML
SOLUTION RESPIRATORY (INHALATION)
Status: DISPENSED
Start: 2020-03-31

## (undated) RX ORDER — LIDOCAINE HYDROCHLORIDE AND EPINEPHRINE 10; 10 MG/ML; UG/ML
INJECTION, SOLUTION INFILTRATION; PERINEURAL
Status: DISPENSED
Start: 2020-03-17

## (undated) RX ORDER — FENTANYL CITRATE 50 UG/ML
INJECTION, SOLUTION INTRAMUSCULAR; INTRAVENOUS
Status: DISPENSED
Start: 2020-03-17

## (undated) RX ORDER — FENTANYL CITRATE-0.9 % NACL/PF 10 MCG/ML
PLASTIC BAG, INJECTION (ML) INTRAVENOUS
Status: DISPENSED
Start: 2020-10-29